# Patient Record
Sex: FEMALE | Race: WHITE | NOT HISPANIC OR LATINO | Employment: OTHER | URBAN - NONMETROPOLITAN AREA
[De-identification: names, ages, dates, MRNs, and addresses within clinical notes are randomized per-mention and may not be internally consistent; named-entity substitution may affect disease eponyms.]

---

## 2017-01-27 ENCOUNTER — PREP FOR SURGERY (OUTPATIENT)
Dept: SURGERY | Facility: HOSPITAL | Age: 67
End: 2017-01-27

## 2017-01-27 RX ORDER — PHENYLEPHRINE HYDROCHLORIDE 100 MG/ML
1 SOLUTION/ DROPS OPHTHALMIC
Status: CANCELLED | OUTPATIENT
Start: 2017-01-27 | End: 2017-01-27

## 2017-01-27 RX ORDER — CYCLOPENTOLATE HYDROCHLORIDE 10 MG/ML
1 SOLUTION/ DROPS OPHTHALMIC
Status: CANCELLED | OUTPATIENT
Start: 2017-01-27 | End: 2017-01-27

## 2017-02-01 RX ORDER — CHLORTHALIDONE 25 MG/1
25 TABLET ORAL DAILY
COMMUNITY
Start: 2012-11-08 | End: 2019-07-30

## 2017-02-01 RX ORDER — SERTRALINE HYDROCHLORIDE 100 MG/1
100 TABLET, FILM COATED ORAL DAILY
COMMUNITY
End: 2023-02-02 | Stop reason: SDUPTHER

## 2017-02-01 RX ORDER — LISINOPRIL 20 MG/1
20 TABLET ORAL DAILY
COMMUNITY
Start: 2012-12-18 | End: 2023-02-02 | Stop reason: SDUPTHER

## 2017-02-02 ENCOUNTER — ANESTHESIA EVENT (OUTPATIENT)
Dept: PERIOP | Facility: HOSPITAL | Age: 67
End: 2017-02-02

## 2017-02-02 ENCOUNTER — HOSPITAL ENCOUNTER (OUTPATIENT)
Facility: HOSPITAL | Age: 67
Setting detail: HOSPITAL OUTPATIENT SURGERY
Discharge: HOME | End: 2017-02-02
Attending: OPHTHALMOLOGY | Admitting: OPHTHALMOLOGY

## 2017-02-02 ENCOUNTER — ANESTHESIA (OUTPATIENT)
Dept: PERIOP | Facility: HOSPITAL | Age: 67
End: 2017-02-02

## 2017-02-02 VITALS
BODY MASS INDEX: 38.28 KG/M2 | DIASTOLIC BLOOD PRESSURE: 73 MMHG | OXYGEN SATURATION: 96 % | HEIGHT: 62 IN | WEIGHT: 208 LBS | SYSTOLIC BLOOD PRESSURE: 111 MMHG | RESPIRATION RATE: 18 BRPM | HEART RATE: 68 BPM | TEMPERATURE: 97.8 F

## 2017-02-02 PROCEDURE — C1780 LENS, INTRAOCULAR (NEW TECH): HCPCS | Performed by: OPHTHALMOLOGY

## 2017-02-02 PROCEDURE — 25010000002 EPINEPHRINE 1 MG/ML SOLUTION: Performed by: OPHTHALMOLOGY

## 2017-02-02 PROCEDURE — 25010000002 EPINEPHRINE PER 0.1 MG: Performed by: OPHTHALMOLOGY

## 2017-02-02 PROCEDURE — 25010000002 VANCOMYCIN PER 500 MG: Performed by: OPHTHALMOLOGY

## 2017-02-02 PROCEDURE — 25010000002 MIDAZOLAM PER 1 MG: Performed by: NURSE ANESTHETIST, CERTIFIED REGISTERED

## 2017-02-02 DEVICE — LENS ACRYSOF IQ 6X13MM SN60WF 22.0: Type: IMPLANTABLE DEVICE | Site: POSTERIOR CHAMBER | Status: FUNCTIONAL

## 2017-02-02 RX ORDER — VANCOMYCIN HYDROCHLORIDE 500 MG/10ML
INJECTION, POWDER, LYOPHILIZED, FOR SOLUTION INTRAVENOUS AS NEEDED
Status: DISCONTINUED | OUTPATIENT
Start: 2017-02-02 | End: 2017-02-02 | Stop reason: HOSPADM

## 2017-02-02 RX ORDER — MIDAZOLAM HYDROCHLORIDE 1 MG/ML
INJECTION INTRAMUSCULAR; INTRAVENOUS AS NEEDED
Status: DISCONTINUED | OUTPATIENT
Start: 2017-02-02 | End: 2017-02-02 | Stop reason: SURG

## 2017-02-02 RX ORDER — TETRACAINE HYDROCHLORIDE 5 MG/ML
SOLUTION OPHTHALMIC AS NEEDED
Status: DISCONTINUED | OUTPATIENT
Start: 2017-02-02 | End: 2017-02-02 | Stop reason: HOSPADM

## 2017-02-02 RX ORDER — PHENYLEPHRINE HYDROCHLORIDE 100 MG/ML
SOLUTION/ DROPS OPHTHALMIC
Status: COMPLETED
Start: 2017-02-02 | End: 2017-02-02

## 2017-02-02 RX ORDER — PREDNISOLONE ACETATE 10 MG/ML
SUSPENSION/ DROPS OPHTHALMIC
Status: DISCONTINUED
Start: 2017-02-02 | End: 2017-02-02 | Stop reason: HOSPADM

## 2017-02-02 RX ORDER — PREDNISOLONE ACETATE 10 MG/ML
1 SUSPENSION/ DROPS OPHTHALMIC 4 TIMES DAILY
Status: DISCONTINUED | OUTPATIENT
Start: 2017-02-02 | End: 2017-02-02 | Stop reason: HOSPADM

## 2017-02-02 RX ORDER — DEXTROSE AND SODIUM CHLORIDE 5; .2 G/100ML; G/100ML
70 INJECTION, SOLUTION INTRAVENOUS CONTINUOUS
Status: DISCONTINUED | OUTPATIENT
Start: 2017-02-02 | End: 2017-02-02 | Stop reason: HOSPADM

## 2017-02-02 RX ORDER — PHENYLEPHRINE HYDROCHLORIDE 100 MG/ML
1 SOLUTION/ DROPS OPHTHALMIC
Status: COMPLETED | OUTPATIENT
Start: 2017-02-02 | End: 2017-02-02

## 2017-02-02 RX ORDER — LIDOCAINE HYDROCHLORIDE 40 MG/ML
SOLUTION TOPICAL AS NEEDED
Status: DISCONTINUED | OUTPATIENT
Start: 2017-02-02 | End: 2017-02-02 | Stop reason: HOSPADM

## 2017-02-02 RX ORDER — EPINEPHRINE 1 MG/ML
INJECTION INTRAMUSCULAR; INTRAVENOUS; SUBCUTANEOUS AS NEEDED
Status: DISCONTINUED | OUTPATIENT
Start: 2017-02-02 | End: 2017-02-02 | Stop reason: HOSPADM

## 2017-02-02 RX ORDER — CYCLOPENTOLATE HYDROCHLORIDE 10 MG/ML
1 SOLUTION/ DROPS OPHTHALMIC
Status: COMPLETED | OUTPATIENT
Start: 2017-02-02 | End: 2017-02-02

## 2017-02-02 RX ORDER — CYCLOPENTOLATE HYDROCHLORIDE 10 MG/ML
SOLUTION/ DROPS OPHTHALMIC
Status: COMPLETED
Start: 2017-02-02 | End: 2017-02-02

## 2017-02-02 RX ADMIN — CYCLOPENTOLATE HYDROCHLORIDE 1 DROP: 10 SOLUTION/ DROPS OPHTHALMIC at 07:14

## 2017-02-02 RX ADMIN — MIDAZOLAM HYDROCHLORIDE 0.5 MG: 1 INJECTION, SOLUTION INTRAMUSCULAR; INTRAVENOUS at 08:50

## 2017-02-02 RX ADMIN — PHENYLEPHRINE HYDROCHLORIDE 1 DROP: 100 SOLUTION/ DROPS OPHTHALMIC at 07:14

## 2017-02-02 RX ADMIN — PHENYLEPHRINE HYDROCHLORIDE 1 DROP: 100 SOLUTION/ DROPS OPHTHALMIC at 07:19

## 2017-02-02 RX ADMIN — CYCLOPENTOLATE HYDROCHLORIDE 1 DROP: 10 SOLUTION/ DROPS OPHTHALMIC at 07:25

## 2017-02-02 RX ADMIN — CYCLOPENTOLATE HYDROCHLORIDE 1 DROP: 10 SOLUTION/ DROPS OPHTHALMIC at 07:19

## 2017-02-02 RX ADMIN — DEXTROSE AND SODIUM CHLORIDE 70 ML/HR: 5; 200 INJECTION, SOLUTION INTRAVENOUS at 07:23

## 2017-02-02 RX ADMIN — PHENYLEPHRINE HYDROCHLORIDE 1 DROP: 100 SOLUTION/ DROPS OPHTHALMIC at 07:25

## 2017-02-02 NOTE — PLAN OF CARE
Problem: Perioperative Period (Adult)  Goal: Signs and Symptoms of Listed Potential Problems Will be Absent or Manageable (Perioperative Period)  Outcome: Ongoing (interventions implemented as appropriate)    02/02/17 0718   Perioperative Period   Problems Assessed (Perioperative Period) pain;infection   Problems Present (Perioperative Period) none

## 2017-02-02 NOTE — ANESTHESIA POSTPROCEDURE EVALUATION
Patient: Elli Edwards    Procedure Summary     Date Anesthesia Start Anesthesia Stop Room / Location    02/02/17 0849 0915 [facility] OR 1 /  [facility]       Procedure Diagnosis Surgeon Provider    RIGHT CATARACT PHACO EXTRACTION WITH INTRAOCULAR LENS IMPLANT (Right Eye) No diagnosis on file. MD Rj Katz CRNA          Anesthesia Type: No value filed.  Last vitals  BP      Temp      Pulse     Resp      SpO2        Post Anesthesia Care and Evaluation    Patient location during evaluation: bedside  Patient participation: complete - patient participated  Level of consciousness: awake and alert  Pain score: 0  Pain management: satisfactory to patient  Airway patency: patent  Anesthetic complications: No anesthetic complications  PONV Status: none  Cardiovascular status: acceptable and stable  Respiratory status: acceptable  Hydration status: acceptable

## 2017-02-02 NOTE — H&P
[id number]  Elli Edwards  female  1950  Miky Garza MD  2/2/2017  PATIENT NAME: Elli Edwards    [facility]  PREOPERATIVE PHYSICAL EXAMINATION    Temp:  [97.8 °F (36.6 °C)] 97.8 °F (36.6 °C)  Heart Rate:  [77] 77  Resp:  [16] 16  BP: (121)/(71) 121/71    Past Medical History   Diagnosis Date   • Cataract      PT REPORTS BILATERAL EYES   • Hypertension    • Seasonal allergies    • Wears glasses        Past Surgical History   Procedure Laterality Date   • Laser ablation     • Tonsillectomy       AT AGE 5   • Colonoscopy     • Endoscopy     • Adenoidectomy     • Mouth surgery       SEVERAL WISDOM TEETH EXTRACTED   • Mouth surgery       ORAL IMPLANT       Social History     Social History   • Marital status:      Spouse name: N/A   • Number of children: N/A   • Years of education: N/A     Occupational History   • Not on file.     Social History Main Topics   • Smoking status: Never Smoker   • Smokeless tobacco: Never Used   • Alcohol use No      Comment: RARELY DRINKS, 2-3 X MONTHLY   • Drug use: No   • Sexual activity: Not on file     Other Topics Concern   • Not on file     Social History Narrative       Family History   Problem Relation Age of Onset   • Breast cancer Neg Hx    • Ovarian cancer Neg Hx        Prescriptions Prior to Admission   Medication Sig Dispense Refill Last Dose   • chlorthalidone (HYGROTON) 25 MG tablet Take 25 mg by mouth Daily.   2/2/2017 at 0600   • lisinopril (PRINIVIL,ZESTRIL) 20 MG tablet Take 20 mg by mouth Daily.   2/2/2017 at 0600   • sertraline (ZOLOFT) 100 MG tablet Take 150 mg by mouth Daily.   2/2/2017 at 0600        Within Normal Limits Abnormal   Mental Status [x]    []     Head, Neck, and Throat [x]   []     Chest/Lungs [x]   []     Cardiovascular [x]   []     Abdomen [x]   []     Extremities [x]   []     Neurologic [x]   []     Other       Diagnosis: Cataract      STATEMENT AS TO REASON OF PLANNED OPERATION:  [x]   H&P revealed no contraindication to planned  surgery. (Check if correct).    [x]   Labs (if ordered) have been reviewed and revealed no contraindications to planned surgery. (Check if correct).    [x]   Patient has been advised to see her local medical doctor/family doctor for follow-up regarding systemic care and/or abnormal labs.  (Check if correct).    Miky Garza MD  2/2/2017

## 2017-02-02 NOTE — ANESTHESIA PREPROCEDURE EVALUATION
Anesthesia Evaluation      Airway   Mallampati: II  TM distance: >3 FB  Neck ROM: full  no difficulty expected  Dental - normal exam     Pulmonary    Cardiovascular   (+) hypertension,     Rhythm: regular  Rate: normal    Neuro/Psych  GI/Hepatic/Renal/Endo    (+) obesity,      Musculoskeletal     Abdominal    Substance History      OB/GYN          Other   (+) arthritis                          Anesthesia Plan    ASA 2     intravenous induction   Anesthetic plan and risks discussed with patient.

## 2017-02-02 NOTE — OP NOTE
"Patient Name: Elli Edwards  Patient Number: [id number]    PRE-OPERATIVE DIAGNOSIS:  Cataract [x]  OD, []  OS  H25.1()    POST-OPERATIVE DIAGNOSIS:  Same    PROCEDURE:     RIGHT CATARACT PHACO EXTRACTION WITH INTRAOCULAR LENS IMPLANT (Right)    Surgeon:      Miky Garza MD    Date of Operation:    2/2/2017    ANESTHESIA:   MAC  COMPLICATIONS:    None  SPECIMEN REMOVED:  Cataract  ESTIMATED BLOOD LOSS:  None    After identifying the patient and obtained fully-informed consent, preoperative drops placed as ordered.  Pre-operative \"time out\" performed.  Patient brought into the operating room and positioned.  Cardiac monitoring was instituted.  Anesthetic gtt to operative eye.      After a surgical scrub, the patient was prepped and draped in the standard ophthalmic fashion.  Lid speculum. Paracentesis. Viscoelastic. Keratome tunneled into anterior chamber.  Capsulorrhexis. Hydrodissection.    Phaco machine tested and found to be in good working order.  Two-handed phacoemulsification performed.     I/A to remove residual cortex.  Viscoelastic in capsular bag.  Intraocular lens placed in standard fashion and centered.  I/A to remove viscoelastic.  Wound hydrated, tested, and found to be watertight.      Lid speculum and drapes removed.  Antibiotic gtt. Eye shield.  Patient to recovery area.  Verbal and written discharge instructions given.  Follow-up appointment given.    Miky Garza MD      Immediate Post-Op Note  Date: 2/2/2017 9:16 AM      "

## 2017-02-14 ENCOUNTER — PREP FOR SURGERY (OUTPATIENT)
Dept: SURGERY | Facility: HOSPITAL | Age: 67
End: 2017-02-14

## 2017-02-14 RX ORDER — CYCLOPENTOLATE HYDROCHLORIDE 20 MG/ML
1 SOLUTION/ DROPS OPHTHALMIC
Status: CANCELLED | OUTPATIENT
Start: 2017-02-14 | End: 2017-02-14

## 2017-02-14 RX ORDER — PHENYLEPHRINE HYDROCHLORIDE 100 MG/ML
1 SOLUTION/ DROPS OPHTHALMIC
Status: CANCELLED | OUTPATIENT
Start: 2017-02-14 | End: 2017-02-14

## 2017-02-15 NOTE — DISCHARGE INSTRUCTIONS
Patient instructed on PAT PASS information.  Patient/family member verbalized understanding of instruction/education.

## 2017-02-16 ENCOUNTER — HOSPITAL ENCOUNTER (OUTPATIENT)
Facility: HOSPITAL | Age: 67
Setting detail: HOSPITAL OUTPATIENT SURGERY
Discharge: HOME | End: 2017-02-16
Attending: OPHTHALMOLOGY | Admitting: OPHTHALMOLOGY

## 2017-02-16 ENCOUNTER — ANESTHESIA EVENT (OUTPATIENT)
Dept: PERIOP | Facility: HOSPITAL | Age: 67
End: 2017-02-16

## 2017-02-16 ENCOUNTER — ANESTHESIA (OUTPATIENT)
Dept: PERIOP | Facility: HOSPITAL | Age: 67
End: 2017-02-16

## 2017-02-16 VITALS
RESPIRATION RATE: 18 BRPM | DIASTOLIC BLOOD PRESSURE: 69 MMHG | HEIGHT: 62 IN | SYSTOLIC BLOOD PRESSURE: 106 MMHG | OXYGEN SATURATION: 96 % | WEIGHT: 208 LBS | BODY MASS INDEX: 38.28 KG/M2 | TEMPERATURE: 98.5 F | HEART RATE: 70 BPM

## 2017-02-16 PROCEDURE — 25010000002 MIDAZOLAM PER 1 MG: Performed by: NURSE ANESTHETIST, CERTIFIED REGISTERED

## 2017-02-16 PROCEDURE — C1780 LENS, INTRAOCULAR (NEW TECH): HCPCS | Performed by: OPHTHALMOLOGY

## 2017-02-16 PROCEDURE — 25010000002 EPINEPHRINE PER 0.1 MG: Performed by: OPHTHALMOLOGY

## 2017-02-16 PROCEDURE — 25010000002 VANCOMYCIN PER 500 MG: Performed by: OPHTHALMOLOGY

## 2017-02-16 DEVICE — LENS ACRYSOF IQ 6X13MM SN60WF 21.5: Type: IMPLANTABLE DEVICE | Site: POSTERIOR CHAMBER | Status: FUNCTIONAL

## 2017-02-16 RX ORDER — PHENYLEPHRINE HYDROCHLORIDE 100 MG/ML
SOLUTION/ DROPS OPHTHALMIC
Status: COMPLETED
Start: 2017-02-16 | End: 2017-02-16

## 2017-02-16 RX ORDER — PREDNISOLONE ACETATE 10 MG/ML
SUSPENSION/ DROPS OPHTHALMIC
Status: DISCONTINUED
Start: 2017-02-16 | End: 2017-02-16 | Stop reason: HOSPADM

## 2017-02-16 RX ORDER — SODIUM CHLORIDE, SODIUM LACTATE, POTASSIUM CHLORIDE, CALCIUM CHLORIDE 600; 310; 30; 20 MG/100ML; MG/100ML; MG/100ML; MG/100ML
1000 INJECTION, SOLUTION INTRAVENOUS CONTINUOUS PRN
Status: DISCONTINUED | OUTPATIENT
Start: 2017-02-16 | End: 2017-02-16 | Stop reason: HOSPADM

## 2017-02-16 RX ORDER — BALANCED SALT SOLUTION 6.4; .75; .48; .3; 3.9; 1.7 MG/ML; MG/ML; MG/ML; MG/ML; MG/ML; MG/ML
SOLUTION OPHTHALMIC AS NEEDED
Status: DISCONTINUED | OUTPATIENT
Start: 2017-02-16 | End: 2017-02-16 | Stop reason: HOSPADM

## 2017-02-16 RX ORDER — TETRACAINE HYDROCHLORIDE 5 MG/ML
SOLUTION OPHTHALMIC AS NEEDED
Status: DISCONTINUED | OUTPATIENT
Start: 2017-02-16 | End: 2017-02-16 | Stop reason: HOSPADM

## 2017-02-16 RX ORDER — SODIUM CHLORIDE 0.9 % (FLUSH) 0.9 %
3 SYRINGE (ML) INJECTION AS NEEDED
Status: DISCONTINUED | OUTPATIENT
Start: 2017-02-16 | End: 2017-02-16 | Stop reason: HOSPADM

## 2017-02-16 RX ORDER — EZETIMIBE 10 MG/1
10 TABLET ORAL DAILY
COMMUNITY
End: 2019-07-30

## 2017-02-16 RX ORDER — CYCLOPENTOLATE HYDROCHLORIDE 20 MG/ML
1 SOLUTION/ DROPS OPHTHALMIC
Status: COMPLETED | OUTPATIENT
Start: 2017-02-16 | End: 2017-02-16

## 2017-02-16 RX ORDER — CYCLOPENTOLATE HYDROCHLORIDE 20 MG/ML
SOLUTION/ DROPS OPHTHALMIC
Status: COMPLETED
Start: 2017-02-16 | End: 2017-02-16

## 2017-02-16 RX ORDER — MIDAZOLAM HYDROCHLORIDE 1 MG/ML
INJECTION INTRAMUSCULAR; INTRAVENOUS AS NEEDED
Status: DISCONTINUED | OUTPATIENT
Start: 2017-02-16 | End: 2017-02-16 | Stop reason: SURG

## 2017-02-16 RX ORDER — LIDOCAINE HYDROCHLORIDE 40 MG/ML
SOLUTION TOPICAL AS NEEDED
Status: DISCONTINUED | OUTPATIENT
Start: 2017-02-16 | End: 2017-02-16 | Stop reason: HOSPADM

## 2017-02-16 RX ORDER — PHENYLEPHRINE HYDROCHLORIDE 100 MG/ML
1 SOLUTION/ DROPS OPHTHALMIC
Status: COMPLETED | OUTPATIENT
Start: 2017-02-16 | End: 2017-02-16

## 2017-02-16 RX ORDER — PREDNISOLONE ACETATE 10 MG/ML
1 SUSPENSION/ DROPS OPHTHALMIC 4 TIMES DAILY
Status: DISCONTINUED | OUTPATIENT
Start: 2017-02-16 | End: 2017-02-16 | Stop reason: HOSPADM

## 2017-02-16 RX ORDER — VANCOMYCIN HYDROCHLORIDE 500 MG/10ML
INJECTION, POWDER, LYOPHILIZED, FOR SOLUTION INTRAVENOUS AS NEEDED
Status: DISCONTINUED | OUTPATIENT
Start: 2017-02-16 | End: 2017-02-16 | Stop reason: HOSPADM

## 2017-02-16 RX ADMIN — CYCLOPENTOLATE HYDROCHLORIDE 1 DROP: 20 SOLUTION/ DROPS OPHTHALMIC at 06:51

## 2017-02-16 RX ADMIN — CYCLOPENTOLATE HYDROCHLORIDE 1 DROP: 20 SOLUTION/ DROPS OPHTHALMIC at 06:41

## 2017-02-16 RX ADMIN — PHENYLEPHRINE HYDROCHLORIDE 1 DROP: 100 SOLUTION/ DROPS OPHTHALMIC at 06:46

## 2017-02-16 RX ADMIN — SODIUM CHLORIDE, POTASSIUM CHLORIDE, SODIUM LACTATE AND CALCIUM CHLORIDE 1000 ML: 600; 310; 30; 20 INJECTION, SOLUTION INTRAVENOUS at 06:49

## 2017-02-16 RX ADMIN — CYCLOPENTOLATE HYDROCHLORIDE 1 DROP: 20 SOLUTION/ DROPS OPHTHALMIC at 06:46

## 2017-02-16 RX ADMIN — PHENYLEPHRINE HYDROCHLORIDE 1 DROP: 100 SOLUTION/ DROPS OPHTHALMIC at 06:41

## 2017-02-16 RX ADMIN — MIDAZOLAM HYDROCHLORIDE 2 MG: 1 INJECTION, SOLUTION INTRAMUSCULAR; INTRAVENOUS at 08:12

## 2017-02-16 RX ADMIN — PHENYLEPHRINE HYDROCHLORIDE 1 DROP: 100 SOLUTION/ DROPS OPHTHALMIC at 06:51

## 2017-02-16 NOTE — H&P
[id number]  Elli Edwards  female  1950  Miky Garza MD  2/16/2017  PATIENT NAME: Elli Edwards    [facility]  PREOPERATIVE PHYSICAL EXAMINATION    Temp:  [98 °F (36.7 °C)] 98 °F (36.7 °C)  Heart Rate:  [76] 76  Resp:  [16] 16  BP: (117)/(79) 117/79    Past Medical History   Diagnosis Date   • Cataract      PT REPORTS BILATERAL EYES   • Hypertension    • Seasonal allergies    • Wears glasses        Past Surgical History   Procedure Laterality Date   • Laser ablation     • Tonsillectomy       AT AGE 5   • Colonoscopy     • Endoscopy     • Adenoidectomy     • Mouth surgery       SEVERAL WISDOM TEETH EXTRACTED   • Mouth surgery       ORAL IMPLANT   • Cataract extraction w/ intraocular lens implant Right 2/2/2017     Procedure: RIGHT CATARACT PHACO EXTRACTION WITH INTRAOCULAR LENS IMPLANT;  Surgeon: Miky Garza MD;  Location: [facility];  Service:    • Cataract extraction Right        Social History     Social History   • Marital status:      Spouse name: N/A   • Number of children: N/A   • Years of education: N/A     Occupational History   • Not on file.     Social History Main Topics   • Smoking status: Never Smoker   • Smokeless tobacco: Never Used   • Alcohol use No      Comment: RARELY DRINKS, 2-3 X MONTHLY   • Drug use: No   • Sexual activity: Not on file     Other Topics Concern   • Not on file     Social History Narrative       Family History   Problem Relation Age of Onset   • Breast cancer Neg Hx    • Ovarian cancer Neg Hx        Prescriptions Prior to Admission   Medication Sig Dispense Refill Last Dose   • chlorthalidone (HYGROTON) 25 MG tablet Take 25 mg by mouth Daily.   2/16/2017 at 0600   • ezetimibe (ZETIA) 10 MG tablet Take 10 mg by mouth Daily.   2/16/2017 at 0600   • lisinopril (PRINIVIL,ZESTRIL) 20 MG tablet Take 20 mg by mouth Daily.   2/16/2017 at 0600   • sertraline (ZOLOFT) 100 MG tablet Take 150 mg by mouth Daily.   2/16/2017 at 0600        Within Normal Limits Abnormal   Mental  Status [x]    []     Head, Neck, and Throat [x]   []     Chest/Lungs [x]   []     Cardiovascular [x]   []     Abdomen [x]   []     Extremities [x]   []     Neurologic [x]   []     Other       Diagnosis: Cataract      STATEMENT AS TO REASON OF PLANNED OPERATION:  [x]   H&P revealed no contraindication to planned surgery. (Check if correct).    [x]   Labs (if ordered) have been reviewed and revealed no contraindications to planned surgery. (Check if correct).    [x]   Patient has been advised to see her local medical doctor/family doctor for follow-up regarding systemic care and/or abnormal labs.  (Check if correct).    Miky Garza MD  2/16/2017

## 2017-02-16 NOTE — PLAN OF CARE
Problem: Perioperative Period (Adult)  Goal: Signs and Symptoms of Listed Potential Problems Will be Absent or Manageable (Perioperative Period)  Outcome: Ongoing (interventions implemented as appropriate)    02/16/17 0632   Perioperative Period   Problems Assessed (Perioperative Period) all   Problems Present (Perioperative Period) none

## 2017-02-16 NOTE — ANESTHESIA PREPROCEDURE EVALUATION
Anesthesia Evaluation     Patient summary reviewed and Nursing notes reviewed   no history of anesthetic complications:  NPO Status: > 8 hours   Airway   Mallampati: I  TM distance: >3 FB  Neck ROM: full  no difficulty expected  Dental - normal exam     Pulmonary - negative pulmonary ROS and normal exam   Cardiovascular - normal exam    (+) hypertension,       Neuro/Psych- negative ROS  GI/Hepatic/Renal/Endo - negative ROS     Musculoskeletal (-) negative ROS    Abdominal    Substance History - negative use     OB/GYN negative ob/gyn ROS         Other - negative ROS                                   Anesthesia Plan    ASA 2     MAC     intravenous induction   Anesthetic plan and risks discussed with patient.

## 2017-02-16 NOTE — OP NOTE
"Patient Name: Elli Edwards  Patient Number: [id number]    PRE-OPERATIVE DIAGNOSIS:  Cataract []  OD, [x]  OS  H25.1()    POST-OPERATIVE DIAGNOSIS:  Same    PROCEDURE:     LEFT CATARACT PHACO EXTRACTION WITH INTRAOCULAR LENS IMPLANT (Left)    Surgeon:      Miky Garza MD    Date of Operation:    2/16/2017    ANESTHESIA:   MAC  COMPLICATIONS:    None  SPECIMEN REMOVED:  Cataract  ESTIMATED BLOOD LOSS:  None    After identifying the patient and obtained fully-informed consent, preoperative drops placed as ordered.  Pre-operative \"time out\" performed.  Patient brought into the operating room and positioned.  Cardiac monitoring was instituted.  Anesthetic gtt to operative eye.      After a surgical scrub, the patient was prepped and draped in the standard ophthalmic fashion.  Lid speculum. Paracentesis. Viscoelastic. Keratome tunneled into anterior chamber.  Capsulorrhexis. Hydrodissection.    Phaco machine tested and found to be in good working order.  Two-handed phacoemulsification performed.     I/A to remove residual cortex.  Viscoelastic in capsular bag.  Intraocular lens placed in standard fashion and centered.  I/A to remove viscoelastic.  Wound hydrated, tested, and found to be watertight.      Lid speculum and drapes removed.  Antibiotic gtt. Eye shield.  Patient to recovery area.  Verbal and written discharge instructions given.  Follow-up appointment given.    Miky Garza MD      Immediate Post-Op Note  Date: 2/16/2017 8:34 AM      "

## 2017-06-26 ENCOUNTER — TRANSCRIBE ORDERS (OUTPATIENT)
Dept: ADMINISTRATIVE | Facility: HOSPITAL | Age: 67
End: 2017-06-26

## 2017-06-26 DIAGNOSIS — Z12.31 VISIT FOR SCREENING MAMMOGRAM: Primary | ICD-10-CM

## 2017-07-07 ENCOUNTER — APPOINTMENT (OUTPATIENT)
Dept: MAMMOGRAPHY | Facility: HOSPITAL | Age: 67
End: 2017-07-07

## 2017-07-19 ENCOUNTER — HOSPITAL ENCOUNTER (OUTPATIENT)
Dept: MAMMOGRAPHY | Facility: HOSPITAL | Age: 67
Discharge: HOME | End: 2017-07-19
Admitting: INTERNAL MEDICINE

## 2017-07-19 DIAGNOSIS — Z12.31 VISIT FOR SCREENING MAMMOGRAM: ICD-10-CM

## 2017-07-19 PROCEDURE — G0202 SCR MAMMO BI INCL CAD: HCPCS

## 2017-07-19 PROCEDURE — 77063 BREAST TOMOSYNTHESIS BI: CPT | Performed by: RADIOLOGY

## 2017-07-19 PROCEDURE — 77063 BREAST TOMOSYNTHESIS BI: CPT

## 2017-07-19 PROCEDURE — G0202 SCR MAMMO BI INCL CAD: HCPCS | Performed by: RADIOLOGY

## 2018-06-13 ENCOUNTER — TRANSCRIBE ORDERS (OUTPATIENT)
Dept: ADMINISTRATIVE | Facility: HOSPITAL | Age: 68
End: 2018-06-13

## 2018-06-13 DIAGNOSIS — Z12.31 VISIT FOR SCREENING MAMMOGRAM: Primary | ICD-10-CM

## 2018-07-23 ENCOUNTER — HOSPITAL ENCOUNTER (OUTPATIENT)
Dept: MAMMOGRAPHY | Facility: HOSPITAL | Age: 68
Discharge: HOME | End: 2018-07-23
Admitting: INTERNAL MEDICINE

## 2018-07-23 DIAGNOSIS — Z12.31 VISIT FOR SCREENING MAMMOGRAM: ICD-10-CM

## 2018-07-23 PROCEDURE — 77067 SCR MAMMO BI INCL CAD: CPT | Performed by: RADIOLOGY

## 2018-07-23 PROCEDURE — 77063 BREAST TOMOSYNTHESIS BI: CPT

## 2018-07-23 PROCEDURE — 77067 SCR MAMMO BI INCL CAD: CPT

## 2018-07-23 PROCEDURE — 77063 BREAST TOMOSYNTHESIS BI: CPT | Performed by: RADIOLOGY

## 2019-07-05 ENCOUNTER — TRANSCRIBE ORDERS (OUTPATIENT)
Dept: ADMINISTRATIVE | Facility: HOSPITAL | Age: 69
End: 2019-07-05

## 2019-07-05 DIAGNOSIS — R11.2 NAUSEA AND VOMITING, INTRACTABILITY OF VOMITING NOT SPECIFIED, UNSPECIFIED VOMITING TYPE: Primary | ICD-10-CM

## 2019-07-09 ENCOUNTER — HOSPITAL ENCOUNTER (OUTPATIENT)
Dept: ULTRASOUND IMAGING | Facility: HOSPITAL | Age: 69
Discharge: HOME | End: 2019-07-09
Admitting: INTERNAL MEDICINE

## 2019-07-09 DIAGNOSIS — R11.2 NAUSEA AND VOMITING, INTRACTABILITY OF VOMITING NOT SPECIFIED, UNSPECIFIED VOMITING TYPE: ICD-10-CM

## 2019-07-09 PROCEDURE — 76705 ECHO EXAM OF ABDOMEN: CPT

## 2019-07-30 ENCOUNTER — APPOINTMENT (OUTPATIENT)
Dept: PREADMISSION TESTING | Facility: HOSPITAL | Age: 69
End: 2019-07-30

## 2019-07-30 VITALS — BODY MASS INDEX: 37.53 KG/M2 | HEIGHT: 62 IN | WEIGHT: 203.93 LBS

## 2019-07-30 LAB
ALBUMIN SERPL-MCNC: 4.3 G/DL (ref 3.5–5.2)
ALBUMIN/GLOB SERPL: 1.7 G/DL
ALP SERPL-CCNC: 67 U/L (ref 39–117)
ALT SERPL W P-5'-P-CCNC: 12 U/L (ref 1–33)
ANION GAP SERPL CALCULATED.3IONS-SCNC: 9 MMOL/L (ref 5–15)
AST SERPL-CCNC: 14 U/L (ref 1–32)
BILIRUB SERPL-MCNC: 0.5 MG/DL (ref 0.2–1.2)
BUN BLD-MCNC: 12 MG/DL (ref 8–23)
BUN/CREAT SERPL: 20 (ref 7–25)
CALCIUM SPEC-SCNC: 9.5 MG/DL (ref 8.6–10.5)
CHLORIDE SERPL-SCNC: 104 MMOL/L (ref 98–107)
CO2 SERPL-SCNC: 28 MMOL/L (ref 22–29)
CREAT BLD-MCNC: 0.6 MG/DL (ref 0.57–1)
DEPRECATED RDW RBC AUTO: 43.7 FL (ref 37–54)
ERYTHROCYTE [DISTWIDTH] IN BLOOD BY AUTOMATED COUNT: 13 % (ref 12.3–15.4)
GFR SERPL CREATININE-BSD FRML MDRD: 99 ML/MIN/1.73
GLOBULIN UR ELPH-MCNC: 2.5 GM/DL
GLUCOSE BLD-MCNC: 107 MG/DL (ref 65–99)
HCT VFR BLD AUTO: 47.2 % (ref 34–46.6)
HGB BLD-MCNC: 15.2 G/DL (ref 12–15.9)
MCH RBC QN AUTO: 29.5 PG (ref 26.6–33)
MCHC RBC AUTO-ENTMCNC: 32.2 G/DL (ref 31.5–35.7)
MCV RBC AUTO: 91.7 FL (ref 79–97)
PLATELET # BLD AUTO: 300 10*3/MM3 (ref 140–450)
PMV BLD AUTO: 11 FL (ref 6–12)
POTASSIUM BLD-SCNC: 4.2 MMOL/L (ref 3.5–5.2)
PROT SERPL-MCNC: 6.8 G/DL (ref 6–8.5)
RBC # BLD AUTO: 5.15 10*6/MM3 (ref 3.77–5.28)
SODIUM BLD-SCNC: 141 MMOL/L (ref 136–145)
WBC NRBC COR # BLD: 6.74 10*3/MM3 (ref 3.4–10.8)

## 2019-07-30 PROCEDURE — 36415 COLL VENOUS BLD VENIPUNCTURE: CPT

## 2019-07-30 PROCEDURE — 85027 COMPLETE CBC AUTOMATED: CPT | Performed by: SURGERY

## 2019-07-30 PROCEDURE — 80053 COMPREHEN METABOLIC PANEL: CPT | Performed by: SURGERY

## 2019-07-30 PROCEDURE — 93010 ELECTROCARDIOGRAM REPORT: CPT | Performed by: INTERNAL MEDICINE

## 2019-07-30 PROCEDURE — 93005 ELECTROCARDIOGRAM TRACING: CPT

## 2019-08-05 ENCOUNTER — ANESTHESIA EVENT (OUTPATIENT)
Dept: PERIOP | Facility: HOSPITAL | Age: 69
End: 2019-08-05

## 2019-08-05 RX ORDER — FAMOTIDINE 10 MG/ML
20 INJECTION, SOLUTION INTRAVENOUS ONCE
Status: CANCELLED | OUTPATIENT
Start: 2019-08-05 | End: 2019-08-05

## 2019-08-06 ENCOUNTER — HOSPITAL ENCOUNTER (OUTPATIENT)
Facility: HOSPITAL | Age: 69
Discharge: HOME | End: 2019-08-06
Attending: SURGERY | Admitting: SURGERY

## 2019-08-06 ENCOUNTER — ANESTHESIA (OUTPATIENT)
Dept: PERIOP | Facility: HOSPITAL | Age: 69
End: 2019-08-06

## 2019-08-06 VITALS
HEART RATE: 84 BPM | SYSTOLIC BLOOD PRESSURE: 157 MMHG | DIASTOLIC BLOOD PRESSURE: 84 MMHG | OXYGEN SATURATION: 90 % | TEMPERATURE: 97.4 F | RESPIRATION RATE: 16 BRPM

## 2019-08-06 DIAGNOSIS — K80.20 CHOLELITHIASIS: ICD-10-CM

## 2019-08-06 PROCEDURE — 25010000002 PHENYLEPHRINE PER 1 ML: Performed by: NURSE ANESTHETIST, CERTIFIED REGISTERED

## 2019-08-06 PROCEDURE — 25010000002 ONDANSETRON PER 1 MG: Performed by: NURSE ANESTHETIST, CERTIFIED REGISTERED

## 2019-08-06 PROCEDURE — 25010000002 DEXAMETHASONE PER 1 MG: Performed by: NURSE ANESTHETIST, CERTIFIED REGISTERED

## 2019-08-06 PROCEDURE — 88304 TISSUE EXAM BY PATHOLOGIST: CPT | Performed by: SURGERY

## 2019-08-06 PROCEDURE — 25010000002 FENTANYL CITRATE (PF) 100 MCG/2ML SOLUTION: Performed by: NURSE ANESTHETIST, CERTIFIED REGISTERED

## 2019-08-06 PROCEDURE — 25010000002 PROPOFOL 10 MG/ML EMULSION: Performed by: NURSE ANESTHETIST, CERTIFIED REGISTERED

## 2019-08-06 RX ORDER — LIDOCAINE HYDROCHLORIDE 10 MG/ML
0.5 INJECTION, SOLUTION EPIDURAL; INFILTRATION; INTRACAUDAL; PERINEURAL ONCE AS NEEDED
Status: COMPLETED | OUTPATIENT
Start: 2019-08-06 | End: 2019-08-06

## 2019-08-06 RX ORDER — FENTANYL CITRATE 50 UG/ML
INJECTION, SOLUTION INTRAMUSCULAR; INTRAVENOUS AS NEEDED
Status: DISCONTINUED | OUTPATIENT
Start: 2019-08-06 | End: 2019-08-06 | Stop reason: SURG

## 2019-08-06 RX ORDER — SODIUM CHLORIDE 0.9 % (FLUSH) 0.9 %
3 SYRINGE (ML) INJECTION EVERY 12 HOURS SCHEDULED
Status: DISCONTINUED | OUTPATIENT
Start: 2019-08-06 | End: 2019-08-06 | Stop reason: HOSPADM

## 2019-08-06 RX ORDER — PROPOFOL 10 MG/ML
VIAL (ML) INTRAVENOUS AS NEEDED
Status: DISCONTINUED | OUTPATIENT
Start: 2019-08-06 | End: 2019-08-06 | Stop reason: SURG

## 2019-08-06 RX ORDER — HYDROMORPHONE HYDROCHLORIDE 1 MG/ML
0.5 INJECTION, SOLUTION INTRAMUSCULAR; INTRAVENOUS; SUBCUTANEOUS
Status: DISCONTINUED | OUTPATIENT
Start: 2019-08-06 | End: 2019-08-06 | Stop reason: HOSPADM

## 2019-08-06 RX ORDER — SODIUM CHLORIDE 9 MG/ML
INJECTION, SOLUTION INTRAVENOUS AS NEEDED
Status: DISCONTINUED | OUTPATIENT
Start: 2019-08-06 | End: 2019-08-06 | Stop reason: HOSPADM

## 2019-08-06 RX ORDER — SODIUM CHLORIDE 0.9 % (FLUSH) 0.9 %
3-10 SYRINGE (ML) INJECTION AS NEEDED
Status: DISCONTINUED | OUTPATIENT
Start: 2019-08-06 | End: 2019-08-06 | Stop reason: HOSPADM

## 2019-08-06 RX ORDER — ATRACURIUM BESYLATE 10 MG/ML
INJECTION, SOLUTION INTRAVENOUS AS NEEDED
Status: DISCONTINUED | OUTPATIENT
Start: 2019-08-06 | End: 2019-08-06 | Stop reason: SURG

## 2019-08-06 RX ORDER — FENTANYL CITRATE 50 UG/ML
50 INJECTION, SOLUTION INTRAMUSCULAR; INTRAVENOUS
Status: DISCONTINUED | OUTPATIENT
Start: 2019-08-06 | End: 2019-08-06 | Stop reason: HOSPADM

## 2019-08-06 RX ORDER — MAGNESIUM HYDROXIDE 1200 MG/15ML
LIQUID ORAL AS NEEDED
Status: DISCONTINUED | OUTPATIENT
Start: 2019-08-06 | End: 2019-08-06 | Stop reason: HOSPADM

## 2019-08-06 RX ORDER — HYDROCODONE BITARTRATE AND ACETAMINOPHEN 5; 325 MG/1; MG/1
1 TABLET ORAL EVERY 6 HOURS PRN
Qty: 10 TABLET | Refills: 0 | Status: SHIPPED | OUTPATIENT
Start: 2019-08-06 | End: 2019-08-14

## 2019-08-06 RX ORDER — NEOSTIGMINE METHYLSULFATE 5 MG/5 ML
SYRINGE (ML) INTRAVENOUS AS NEEDED
Status: DISCONTINUED | OUTPATIENT
Start: 2019-08-06 | End: 2019-08-06 | Stop reason: SURG

## 2019-08-06 RX ORDER — SIMETHICONE 80 MG
80 TABLET,CHEWABLE ORAL 4 TIMES DAILY PRN
Status: DISCONTINUED | OUTPATIENT
Start: 2019-08-06 | End: 2019-08-06 | Stop reason: HOSPADM

## 2019-08-06 RX ORDER — BUPIVACAINE HYDROCHLORIDE 2.5 MG/ML
INJECTION, SOLUTION INFILTRATION; PERINEURAL AS NEEDED
Status: DISCONTINUED | OUTPATIENT
Start: 2019-08-06 | End: 2019-08-06 | Stop reason: HOSPADM

## 2019-08-06 RX ORDER — ONDANSETRON 2 MG/ML
INJECTION INTRAMUSCULAR; INTRAVENOUS AS NEEDED
Status: DISCONTINUED | OUTPATIENT
Start: 2019-08-06 | End: 2019-08-06 | Stop reason: SURG

## 2019-08-06 RX ORDER — FAMOTIDINE 20 MG/1
20 TABLET, FILM COATED ORAL ONCE
Status: COMPLETED | OUTPATIENT
Start: 2019-08-06 | End: 2019-08-06

## 2019-08-06 RX ORDER — ONDANSETRON 2 MG/ML
4 INJECTION INTRAMUSCULAR; INTRAVENOUS ONCE AS NEEDED
Status: DISCONTINUED | OUTPATIENT
Start: 2019-08-06 | End: 2019-08-06 | Stop reason: HOSPADM

## 2019-08-06 RX ORDER — GLYCOPYRROLATE 0.2 MG/ML
INJECTION INTRAMUSCULAR; INTRAVENOUS AS NEEDED
Status: DISCONTINUED | OUTPATIENT
Start: 2019-08-06 | End: 2019-08-06 | Stop reason: SURG

## 2019-08-06 RX ORDER — DEXAMETHASONE SODIUM PHOSPHATE 4 MG/ML
INJECTION, SOLUTION INTRA-ARTICULAR; INTRALESIONAL; INTRAMUSCULAR; INTRAVENOUS; SOFT TISSUE AS NEEDED
Status: DISCONTINUED | OUTPATIENT
Start: 2019-08-06 | End: 2019-08-06 | Stop reason: SURG

## 2019-08-06 RX ORDER — SODIUM CHLORIDE, SODIUM LACTATE, POTASSIUM CHLORIDE, CALCIUM CHLORIDE 600; 310; 30; 20 MG/100ML; MG/100ML; MG/100ML; MG/100ML
9 INJECTION, SOLUTION INTRAVENOUS CONTINUOUS
Status: DISCONTINUED | OUTPATIENT
Start: 2019-08-06 | End: 2019-08-06 | Stop reason: HOSPADM

## 2019-08-06 RX ADMIN — ATRACURIUM BESYLATE 30 MG: 10 INJECTION, SOLUTION INTRAVENOUS at 12:30

## 2019-08-06 RX ADMIN — EPHEDRINE SULFATE 10 MG: 50 INJECTION INTRAMUSCULAR; INTRAVENOUS; SUBCUTANEOUS at 12:51

## 2019-08-06 RX ADMIN — FENTANYL CITRATE 50 MCG: 50 INJECTION, SOLUTION INTRAMUSCULAR; INTRAVENOUS at 13:31

## 2019-08-06 RX ADMIN — DEXAMETHASONE SODIUM PHOSPHATE 8 MG: 4 INJECTION, SOLUTION INTRAMUSCULAR; INTRAVENOUS at 12:48

## 2019-08-06 RX ADMIN — GLYCOPYRROLATE 0.2 MG: 0.2 INJECTION, SOLUTION INTRAMUSCULAR; INTRAVENOUS at 13:22

## 2019-08-06 RX ADMIN — PROPOFOL 120 MG: 10 INJECTION, EMULSION INTRAVENOUS at 12:29

## 2019-08-06 RX ADMIN — SIMETHICONE CHEW TAB 80 MG 80 MG: 80 TABLET ORAL at 13:56

## 2019-08-06 RX ADMIN — GLYCOPYRROLATE 0.2 MG: 0.2 INJECTION, SOLUTION INTRAMUSCULAR; INTRAVENOUS at 12:45

## 2019-08-06 RX ADMIN — PROPOFOL 30 MG: 10 INJECTION, EMULSION INTRAVENOUS at 12:35

## 2019-08-06 RX ADMIN — ONDANSETRON 4 MG: 2 INJECTION INTRAMUSCULAR; INTRAVENOUS at 13:07

## 2019-08-06 RX ADMIN — LIDOCAINE HYDROCHLORIDE 0.2 ML: 10 INJECTION, SOLUTION EPIDURAL; INFILTRATION; INTRACAUDAL; PERINEURAL at 11:33

## 2019-08-06 RX ADMIN — SODIUM CHLORIDE, POTASSIUM CHLORIDE, SODIUM LACTATE AND CALCIUM CHLORIDE: 600; 310; 30; 20 INJECTION, SOLUTION INTRAVENOUS at 13:02

## 2019-08-06 RX ADMIN — FAMOTIDINE 20 MG: 20 TABLET ORAL at 11:44

## 2019-08-06 RX ADMIN — Medication 3 MG: at 13:16

## 2019-08-06 RX ADMIN — FENTANYL CITRATE 50 MCG: 50 INJECTION, SOLUTION INTRAMUSCULAR; INTRAVENOUS at 12:29

## 2019-08-06 RX ADMIN — Medication 2 MG: at 13:22

## 2019-08-06 RX ADMIN — PHENYLEPHRINE HYDROCHLORIDE 100 MCG: 10 INJECTION INTRAVENOUS at 12:53

## 2019-08-06 RX ADMIN — GLYCOPYRROLATE 0.4 MG: 0.2 INJECTION, SOLUTION INTRAMUSCULAR; INTRAVENOUS at 13:16

## 2019-08-06 RX ADMIN — PROPOFOL 50 MG: 10 INJECTION, EMULSION INTRAVENOUS at 12:31

## 2019-08-06 RX ADMIN — SODIUM CHLORIDE, POTASSIUM CHLORIDE, SODIUM LACTATE AND CALCIUM CHLORIDE 9 ML/HR: 600; 310; 30; 20 INJECTION, SOLUTION INTRAVENOUS at 11:33

## 2019-08-06 NOTE — BRIEF OP NOTE
CHOLECYSTECTOMY LAPAROSCOPIC  Progress Note    Elli Edwards  8/6/2019    Pre-op Diagnosis:   Cholelithiasis       Post-Op Diagnosis Codes:  Same plus cholecystitis    Procedure/CPT® Codes:      Procedure(s):  CHOLECYSTECTOMY LAPAROSCOPIC    Surgeon(s):  Oliver Aguilar MD    Anesthesia: General    Staff:   Circulator: Dinorah Marcano RN  Scrub Person: Sterling Johnson; Mare Rendon  Nursing Assistant: Ryan Ureña    Estimated Blood Loss: 5 mL    Specimens:                Specimens     ID Source Type Tests Collected By Collected At Frozen?      A Gallbladder Tissue · TISSUE PATHOLOGY EXAM   Oliver Aguilar MD 8/6/19 0749 No         Findings: omental adhesions, lucian ailyn gisele type adhesions from liver to abd wall and gallbladder to liver    Complications: none      Oliver Aguilar MD     Date: 8/6/2019  Time: 1:24 PM

## 2019-08-06 NOTE — ANESTHESIA PROCEDURE NOTES
Airway  Urgency: elective    Airway not difficult    General Information and Staff    Patient location during procedure: OR  CRNA: Tamar Geiger CRNA    Indications and Patient Condition  Indications for airway management: airway protection    Preoxygenated: yes  MILS not maintained throughout  Mask difficulty assessment: 1 - vent by mask    Final Airway Details  Final airway type: endotracheal airway      Successful airway: ETT  Cuffed: yes   Successful intubation technique: direct laryngoscopy  Facilitating devices/methods: intubating stylet  Endotracheal tube insertion site: oral  Blade: Pete  Blade size: 3  ETT size (mm): 7.5  Cormack-Lehane Classification: grade IIb - view of arytenoids or posterior of glottis only  Placement verified by: chest auscultation and capnometry   Measured from: lips  ETT to lips (cm): 20  Number of attempts at approach: 1    Additional Comments  Gradeview III with mac 3. Unable to pass eschman. Patient ventilated- VSS. glidescope used, gradeview IIb, and very tight. No dental or gum damage noted. Negative epigastric sounds, Breath sound equal bilaterally with symmetric chest rise and fall

## 2019-08-06 NOTE — ANESTHESIA PREPROCEDURE EVALUATION
Anesthesia Evaluation     Patient summary reviewed and Nursing notes reviewed   no history of anesthetic complications:  NPO Solid Status: > 8 hours  NPO Liquid Status: > 4 hours           Airway   Mallampati: III  TM distance: >3 FB  Neck ROM: full  No difficulty expected  Dental      Comment: Good dentition    Pulmonary - negative pulmonary ROS    breath sounds clear to auscultation  (-) COPD, asthma, shortness of breath, sleep apnea  Cardiovascular   Exercise tolerance: good (4-7 METS)    Rhythm: regular  Rate: normal    (+) hyperlipidemia,   (-) past MI, CAD, murmur, friction rub      Neuro/Psych  (-) seizures, CVA  GI/Hepatic/Renal/Endo    (+) obesity,       ROS Comment: Gallstones. Diarrhea with N/V. Not currently nauseous.     Musculoskeletal (-) negative ROS    Abdominal   (+) obese,    Substance History - negative use     OB/GYN negative ob/gyn ROS         Other - negative ROS                       Anesthesia Plan    ASA 2     general with block and general     Anesthetic plan, all risks, benefits, and alternatives have been provided, discussed and informed consent has been obtained with: patient.  Use of blood products discussed with patient .   Plan discussed with CRNA.

## 2019-08-06 NOTE — INTERVAL H&P NOTE
[facility] Pre-op    Full history and physical note from office is up to date.  See office note attached.    /81 (BP Location: Right arm, Patient Position: Lying)   Pulse 74   Temp 98.5 °F (36.9 °C) (Temporal)   Resp 18   SpO2 94%     LAB Results:  Lab Results   Component Value Date    WBC 6.74 07/30/2019    HGB 15.2 07/30/2019    HCT 47.2 (H) 07/30/2019    MCV 91.7 07/30/2019     07/30/2019    GLUCOSE 107 (H) 07/30/2019    BUN 12 07/30/2019    CREATININE 0.60 07/30/2019    EGFRIFNONA 99 07/30/2019     07/30/2019    K 4.2 07/30/2019     07/30/2019    CO2 28.0 07/30/2019    CALCIUM 9.5 07/30/2019    ALBUMIN 4.30 07/30/2019    AST 14 07/30/2019    ALT 12 07/30/2019    BILITOT 0.5 07/30/2019       Cancer Staging (if applicable)  Cancer Patient: __ yes _x_no __unknown__N/A; If yes, clinical stage T:__ N:__M:__, stage group or __N/A    Saida Murrell, APRN 8/6/2019 12:22 PM

## 2019-08-06 NOTE — OP NOTE
Operative Report    Patient Name:  Elli Edwards  YOB: 1950  [id number]  Date of service: 8/6/2019      PREOPERATIVE DIAGNOSIS: cholelithiasis      POSTOPERATIVE DIAGNOSIS: Same plus chronic cholecystitis       PROCEDURE PERFORMED: Laparoscopic cholecystectomy     SURGEON: Oliver Aguilar MD    SPECIMENS: Gallbladder and contents      ANESTHESIA: General.      FINDINGS: omental adhesions, lucian ailyn gisele type adhesions from liver to abd wall and gallbladder to liver     INDICATIONS:      The patient is a 68 y.o. female with a history of abdominal pain, concerning for biliary etiology. Pre-operative imaging confirmed the diagnosis. The risks and benefits of Laparoscopic cholecystectomy  were discussed with the patient and their family and they agree to proceed.      DESCRIPTION OF PROCEDURE:     After obtaining informed consent, the patient was taken to the operating room and placed in the supine position. After appropriate DVT prophylaxis, general anesthesia was induced. The abdomen was prepped and draped in standard sterile fashion, and after infiltrating the skin with local anesthetic, a transverse 12mm skin incision was made inferior to the umbilicus. Blunt dissection was carried down to the base of the umbilicus, which was grasped with a Kocher clamp and elevated anteriorly. A vertical midline incision was made in the fascia, and blunt dissection was carried down into the peritoneal cavity.Beba trocar was placed. The abdomen was insufflated with carbon dioxide gas to a pressure of 15 mmHg, and a laparoscope advanced through the trocar and the abdominal contents were inspected. There was no evidence of bowel, bladder, or visceral injury with entrance of the trocar. At this point, after infiltrating the skin with local anesthetic, a standard laparoscopic cholecystectomy trocar placement schema was followed.     The omental adhesions and liver adhesions were taken down. The gallbladder was  "grasped and elevated superiorly. Using meticulous blunt dissection, the cystic duct and cystic artery were bluntly dissected free of other structures and clearly identified using the \"Critical View\" technique. The cystic duct and artery were then clipped three times proximally and once distally, and divided between the clips.     The gallbladder was then dissected free of the gallbladder fossa using a combination of electrocautery and blunt dissection. The gallbladder was then placed in an Endocatch bag, and removed from the inferiormost trocar site.     The right upper quadrant was then inspected. The cystic duct and cystic artery stumps were intact without bleeding or biliary leak and liver bed was hemostatic. The right upper quadrant was irrigated with saline until clear. The abdomen was again irrigated with saline until clear, and all trocars removed under direct and laparoscopic visualization. The fascia at the unmbilical incision was closed using 0 Vicryl suture. The wounds were closed in each area using absorbable subcuticular suture. The incisions were dressed in standard sterile fashion and covered with dry dressings. The patient recovered from anesthesia, was extubated in the operating room, and transferred to the PACU in stable condition.  All sponge and needle counts were correct times two at the completion of the procedure. There were no immediate complications.    Oliver Aguilar MD  8/6/2019  1:26 PM      "

## 2019-08-06 NOTE — ANESTHESIA POSTPROCEDURE EVALUATION
Patient: Elli Edwards    Procedure Summary     Date:  08/06/19 Room / Location:   [facility] OR 11 /  [facility]    Anesthesia Start:  1224 Anesthesia Stop:      Procedure:  CHOLECYSTECTOMY LAPAROSCOPIC (N/A Abdomen) Diagnosis:      Surgeon:  Oliver Aguilar MD Provider:  Danilo Prado Jr., MD    Anesthesia Type:  general with block, general ASA Status:  2          Anesthesia Type: general with block, general  Last vitals  BP   139/81 (08/06/19 1137)   Temp   98.5 °F (36.9 °C) (08/06/19 1137)   Pulse   74 (08/06/19 1137)   Resp   18 (08/06/19 1137)     SpO2   94 % (08/06/19 1137)     Post Anesthesia Care and Evaluation    Patient location during evaluation: PACU  Patient participation: complete - patient participated  Level of consciousness: awake and alert  Pain score: 0  Pain management: adequate  Airway patency: patent  Anesthetic complications: No anesthetic complications  PONV Status: none  Cardiovascular status: hemodynamically stable and acceptable  Respiratory status: nonlabored ventilation, acceptable and nasal cannula  Hydration status: acceptable

## 2019-08-07 LAB
CYTO UR: NORMAL
LAB AP CASE REPORT: NORMAL
LAB AP CLINICAL INFORMATION: NORMAL
PATH REPORT.FINAL DX SPEC: NORMAL
PATH REPORT.GROSS SPEC: NORMAL

## 2019-08-12 RX ORDER — ROSUVASTATIN CALCIUM 20 MG/1
20 TABLET, COATED ORAL DAILY
COMMUNITY
End: 2019-08-14

## 2019-08-12 RX ORDER — CHLORTHALIDONE 25 MG/1
25 TABLET ORAL DAILY
COMMUNITY
End: 2019-08-14

## 2019-08-12 RX ORDER — EZETIMIBE 10 MG/1
10 TABLET ORAL DAILY
COMMUNITY
End: 2019-08-14

## 2019-08-12 RX ORDER — LAMOTRIGINE 25 MG/1
25 TABLET ORAL DAILY
COMMUNITY
End: 2019-08-14

## 2019-08-12 RX ORDER — MONTELUKAST SODIUM 4 MG/1
1 TABLET, CHEWABLE ORAL
COMMUNITY
End: 2019-08-14

## 2019-08-14 ENCOUNTER — OFFICE VISIT (OUTPATIENT)
Dept: GASTROENTEROLOGY | Facility: CLINIC | Age: 69
End: 2019-08-14

## 2019-08-14 VITALS
WEIGHT: 201 LBS | DIASTOLIC BLOOD PRESSURE: 78 MMHG | HEIGHT: 62 IN | RESPIRATION RATE: 16 BRPM | HEART RATE: 76 BPM | BODY MASS INDEX: 36.99 KG/M2 | SYSTOLIC BLOOD PRESSURE: 155 MMHG | TEMPERATURE: 97.5 F

## 2019-08-14 DIAGNOSIS — Z12.11 COLON CANCER SCREENING: Primary | ICD-10-CM

## 2019-08-14 PROBLEM — I80.9 PHLEBITIS: Status: ACTIVE | Noted: 2019-08-14

## 2019-08-14 PROBLEM — E78.5 HYPERLIPIDEMIA: Status: ACTIVE | Noted: 2019-08-14

## 2019-08-14 PROBLEM — R42 VERTIGO: Status: ACTIVE | Noted: 2019-08-14

## 2019-08-14 PROBLEM — I10 BENIGN ESSENTIAL HYPERTENSION: Status: ACTIVE | Noted: 2019-08-14

## 2019-08-14 PROBLEM — R12 HEARTBURN: Status: ACTIVE | Noted: 2019-08-14

## 2019-08-14 PROBLEM — K63.5 COLON POLYP: Status: ACTIVE | Noted: 2019-08-14

## 2019-08-14 PROBLEM — E55.9 VITAMIN D DEFICIENCY: Status: ACTIVE | Noted: 2019-08-14

## 2019-08-14 PROCEDURE — S0260 H&P FOR SURGERY: HCPCS | Performed by: NURSE PRACTITIONER

## 2019-08-14 RX ORDER — SODIUM CHLORIDE 9 MG/ML
70 INJECTION, SOLUTION INTRAVENOUS CONTINUOUS PRN
Status: CANCELLED | OUTPATIENT
Start: 2019-10-08

## 2019-08-14 NOTE — PROGRESS NOTES
Chief Complaint   Patient presents with   • Colon Cancer Screening     HPI     The patient denies recent change in bowel habits. There is no diarrhea or constipation. There is no history of abdominal pain. There is no history of overt GI bleed (hematemesis melena or hematochezia). The patient denies nausea or vomiting. There is no history of reflux. The patient denies dysphagia or odynophagia. There is no history of recent significant weight loss. There is no history of liver disease in the past. There is a family history of colon cancer in the patient's grandmother. The patient's last colonoscopy was in 2015.    Review of Systems   Constitutional: Negative for appetite change, chills, fatigue, fever and unexpected weight change.   HENT: Negative for mouth sores, nosebleeds and trouble swallowing.    Eyes: Negative for discharge and redness.   Respiratory: Negative for apnea, cough and shortness of breath.    Cardiovascular: Negative for chest pain, palpitations and leg swelling.   Gastrointestinal: Negative for abdominal distention, abdominal pain, anal bleeding, blood in stool, constipation, diarrhea, nausea and vomiting.   Endocrine: Negative for cold intolerance, heat intolerance and polydipsia.   Genitourinary: Negative for dysuria, hematuria and urgency.   Musculoskeletal: Negative for arthralgias, joint swelling and myalgias.   Skin: Negative for rash.   Allergic/Immunologic: Positive for environmental allergies and food allergies. Negative for immunocompromised state.   Neurological: Negative for dizziness, seizures, syncope and headaches.   Hematological: Negative for adenopathy. Does not bruise/bleed easily.   Psychiatric/Behavioral: Negative for dysphoric mood. The patient is nervous/anxious. The patient is not hyperactive.      Patient Active Problem List   Diagnosis   • Benign essential hypertension   • Colon polyp   • Heartburn   • Hyperlipidemia   • Phlebitis   • Vertigo   • Vitamin D deficiency  "    Past Medical History:   Diagnosis Date   • Arthritis    • Cataract     PT REPORTS BILATERAL EYES   • Colon polyp 12/03/2015   • Depression    • Diverticulosis    • Elevated cholesterol    • Hypertension    • Seasonal allergies    • Wears glasses      Past Surgical History:   Procedure Laterality Date   • ADENOIDECTOMY     • CATARACT EXTRACTION Bilateral    • CATARACT EXTRACTION W/ INTRAOCULAR LENS IMPLANT Right 2/2/2017    Procedure: RIGHT CATARACT PHACO EXTRACTION WITH INTRAOCULAR LENS IMPLANT;  Surgeon: Miky Garza MD;  Location:  [facility];  Service:    • CATARACT EXTRACTION W/ INTRAOCULAR LENS IMPLANT Left 2/16/2017    Procedure: LEFT CATARACT PHACO EXTRACTION WITH INTRAOCULAR LENS IMPLANT;  Surgeon: Miky Garza MD;  Location:  [facility];  Service:    • CHOLECYSTECTOMY N/A 8/6/2019    Procedure: CHOLECYSTECTOMY LAPAROSCOPIC;  Surgeon: Oliver Aguilar MD;  Location:  [facility];  Service: General   • COLONOSCOPY  2016   • ENDOSCOPY     • LASER ABLATION      cervical   • MOUTH SURGERY      SEVERAL WISDOM TEETH EXTRACTED   • MOUTH SURGERY      ORAL IMPLANT   • TONSILLECTOMY      AT AGE 5     Family History   Problem Relation Age of Onset   • Prostate cancer Father    • Colon cancer Paternal Grandmother    • Breast cancer Neg Hx    • Ovarian cancer Neg Hx      Social History     Tobacco Use   • Smoking status: Never Smoker   • Smokeless tobacco: Never Used   Substance Use Topics   • Alcohol use: No     Comment: RARELY DRINKS, 2-3 X MONTHLY       Current Outpatient Medications:   •  lisinopril (PRINIVIL,ZESTRIL) 20 MG tablet, Take 20 mg by mouth Daily., Disp: , Rfl:   •  sertraline (ZOLOFT) 100 MG tablet, Take 100 mg by mouth Daily., Disp: , Rfl:     Allergies   Allergen Reactions   • Codeine Nausea And Vomiting and Dizziness     /78   Pulse 76   Temp 97.5 °F (36.4 °C)   Resp 16   Ht 157.5 cm (62\")   Wt 91.2 kg (201 lb)   BMI 36.76 kg/m²     Physical Exam   Constitutional: She is oriented to person, " place, and time. She appears well-developed and well-nourished. No distress.   HENT:   Head: Normocephalic and atraumatic.   Right Ear: Hearing and external ear normal.   Left Ear: Hearing and external ear normal.   Nose: Nose normal.   Mouth/Throat: Oropharynx is clear and moist and mucous membranes are normal. Mucous membranes are not pale, not dry and not cyanotic. No oral lesions. No oropharyngeal exudate.   Eyes: Conjunctivae and EOM are normal. Right eye exhibits no discharge. Left eye exhibits no discharge.   Neck: Trachea normal. Neck supple. No JVD present. No edema present. No thyroid mass and no thyromegaly present.   Cardiovascular: Normal rate, regular rhythm, S2 normal and normal heart sounds. Exam reveals no gallop, no S3 and no friction rub.   No murmur heard.  Pulmonary/Chest: Effort normal and breath sounds normal. No respiratory distress. She exhibits no tenderness.   Abdominal: Normal appearance and bowel sounds are normal. She exhibits no distension, no ascites and no mass. There is no splenomegaly or hepatomegaly. There is no tenderness. There is no rigidity, no rebound and no guarding. No hernia.     Vascular Status -  Her right foot exhibits no edema. Her left foot exhibits no edema.  Lymphadenopathy:     She has no cervical adenopathy.        Left: No supraclavicular adenopathy present.   Neurological: She is alert and oriented to person, place, and time. She has normal strength. No cranial nerve deficit or sensory deficit.   Skin: No rash noted. She is not diaphoretic. No cyanosis. No pallor. Nails show no clubbing.   Psychiatric: She has a normal mood and affect.   Nursing note and vitals reviewed.  Stigmata of chronic liver disease:  None.  Asterixis:  None.    Laboratory Tests:   Upon review of records:     Dated 6/17/2019 glucose 101 BUN 17 creatinine 0.87 sodium 143 potassium 3.9 chloride 100 CO2 23 calcium 10.1 albumin 4.8 total bilirubin 0.4 alkaline phosphatase 75 AST 17 ALT  15    Dated 7/30/2019 glucose 107 BUN 12 creatinine 0.6 sodium 141 potassium 4.2 chloride 104 CO2 28 calcium 9.5 albumin 4.3 ALT 12 AST 14 alkaline phosphatase 67 total bilirubin 0.5 WBC 6.74 hemoglobin 15.2 hematocrit 47.2 platelet count 300 MCV 91.7    Abdominal Imaging:  Upon review of records:    Gallbladder ultrasound dated 7/9/2019 reveals limited images of the pancreas are unremarkable. The liver parenchyma is normal in echogenicity. Stones are present within the gallbladder. There is no gallbladder wall thickening. There is no pericholecystic fluid. The common duct measures 4.40 mm. Limited images of the right kidney are unremarkable.    Procedures:  Upon review of records:    Colonoscopy dated 12/03/2015: Scant early diverticular change and left colon. Colon polyps. Internal hemorrhoids. No endoscopic evidence of ileitis or colitis was seen. Random biopsies were obtained from the colon upon withdrawal of the scope. Hepatic flexure polyp, biopsy revealed multiple fragments of tubular adenoma, no high-grade dysplasia present. Transverse colon polyp, biopsy revealed multiple fragments of tubular adenoma, no high-grade dysplasia present. Cecum polyp, biopsy revealed mild nonspecific reactive hyperplasia. No polyp structures, adenomatous change or inflammation present. Sigmoid colon polyp, biopsy revealed 2 hyperplastic polyps without atypia. Random colon biopsies revealed moderate reactive changes, mild epithelial lymphocytosis. Features suggestive but not diagnostic of microscopic colitis, lymphocytic type.    Assessment:      ICD-10-CM ICD-9-CM   1. Colon cancer screening Z12.11 V76.51     Plan/  Patient Instructions   1. Colonoscopy: Description of the procedure, risks, benefits, alternatives and options, including nonoperative options, were discussed with the patient in detail. The patient understands and wishes to proceed.     Zohaib Dodge, APRN

## 2019-08-23 DIAGNOSIS — M25.551 ARTHRALGIA OF RIGHT HIP: Primary | ICD-10-CM

## 2019-08-23 DIAGNOSIS — M25.561 ARTHRALGIA OF KNEE, RIGHT: ICD-10-CM

## 2019-08-26 ENCOUNTER — OFFICE VISIT (OUTPATIENT)
Dept: ORTHOPEDIC SURGERY | Facility: CLINIC | Age: 69
End: 2019-08-26

## 2019-08-26 VITALS — HEIGHT: 62 IN | WEIGHT: 201 LBS | BODY MASS INDEX: 36.99 KG/M2 | RESPIRATION RATE: 18 BRPM

## 2019-08-26 DIAGNOSIS — M25.551 ARTHRALGIA OF RIGHT HIP: Primary | ICD-10-CM

## 2019-08-26 DIAGNOSIS — M25.561 ARTHRALGIA OF RIGHT KNEE: ICD-10-CM

## 2019-08-26 PROCEDURE — 73560 X-RAY EXAM OF KNEE 1 OR 2: CPT | Performed by: ORTHOPAEDIC SURGERY

## 2019-08-26 PROCEDURE — 99202 OFFICE O/P NEW SF 15 MIN: CPT | Performed by: ORTHOPAEDIC SURGERY

## 2019-08-26 PROCEDURE — 73502 X-RAY EXAM HIP UNI 2-3 VIEWS: CPT | Performed by: ORTHOPAEDIC SURGERY

## 2019-09-04 ENCOUNTER — TRANSCRIBE ORDERS (OUTPATIENT)
Dept: ADMINISTRATIVE | Facility: HOSPITAL | Age: 69
End: 2019-09-04

## 2019-09-04 DIAGNOSIS — Z12.31 VISIT FOR SCREENING MAMMOGRAM: Primary | ICD-10-CM

## 2019-09-20 ENCOUNTER — APPOINTMENT (OUTPATIENT)
Dept: GENERAL RADIOLOGY | Facility: HOSPITAL | Age: 69
End: 2019-09-20

## 2019-09-20 ENCOUNTER — HOSPITAL ENCOUNTER (OUTPATIENT)
Dept: GENERAL RADIOLOGY | Facility: HOSPITAL | Age: 69
Discharge: HOME | End: 2019-09-20
Admitting: ORTHOPAEDIC SURGERY

## 2019-09-20 PROCEDURE — 77002 NEEDLE LOCALIZATION BY XRAY: CPT

## 2019-09-20 PROCEDURE — 25010000003 LIDOCAINE 1 % SOLUTION: Performed by: ORTHOPAEDIC SURGERY

## 2019-09-20 PROCEDURE — 25010000002 METHYLPREDNISOLONE PER 80 MG: Performed by: ORTHOPAEDIC SURGERY

## 2019-09-20 RX ORDER — LIDOCAINE HYDROCHLORIDE 10 MG/ML
10 INJECTION, SOLUTION INFILTRATION; PERINEURAL ONCE
Status: COMPLETED | OUTPATIENT
Start: 2019-09-20 | End: 2019-09-20

## 2019-09-20 RX ORDER — METHYLPREDNISOLONE ACETATE 80 MG/ML
80 INJECTION, SUSPENSION INTRA-ARTICULAR; INTRALESIONAL; INTRAMUSCULAR; SOFT TISSUE ONCE
Status: COMPLETED | OUTPATIENT
Start: 2019-09-20 | End: 2019-09-20

## 2019-09-20 RX ADMIN — LIDOCAINE HYDROCHLORIDE 9 ML: 10 INJECTION, SOLUTION INFILTRATION; PERINEURAL at 12:33

## 2019-09-20 RX ADMIN — METHYLPREDNISOLONE ACETATE 80 MG: 80 INJECTION, SUSPENSION INTRA-ARTICULAR; INTRALESIONAL; INTRAMUSCULAR; SOFT TISSUE at 12:31

## 2019-09-20 NOTE — POST-PROCEDURE NOTE
[facility]  [address], [address]  [place] [address]  [phone]        PROCEDURE REPORT        DIAGNOSIS:  Right hip osteoarthritis, symptomatic    PROCEDURE: Right  hip injection under flouroscopy      Elli Edwards with date of birth 1950 presents to [facility] Radiology Department today for injection therapy.        Patient presents to [facility] Radiology Department Flouroscopy Suite on 9/20/2019 for planned elective right hip injection under flouroscopy for symptomatic osteoarthritis.    Procedure:     After consent was obtained, and using ethyl chloride topical local anesthetic, the right hip was then prepped and draped with sterile technique. With an anterior hip approach, flouroscopy guidance, and care to stay lateral of the femoral artery, the hip joint was entered via a 20 gauge spinal needle.  A mixture of 80 mg methylprednisolone in one ml plus 9 ml of 1% plain Lidocaine was injected and the needle withdrawn. The procedure was well tolerated and without complication. The patient noted relief of focal hip joint pain.  The patient did remain stable and with baseline ambulation. The patient is asked to rest the joint for a few more days before resuming full regular activities. It may be painful for the first few days. Watch for fever, skin issues, increased swelling or persistent pain in the joint. Call or return to clinic if such symptoms occur, other concerns or if there is lack of improvement as anticipated.    Impression: Symptomatic right hip osteoarthritis.      Recommendations/Plan:      Treatment and patient advice as noted here and in office visit report.  Orthopedic activities reviewed and patient expressed appreciation.  Discussion of orthopedic goals.   Risk, benefits, and merits of treatment options reviewed and questions answered.  Call or notify for any adverse effect from injection therapy.    Exercise: As tolerated.  No strenuous  activity for a few days as appropriate.  Brace:  No brace was given at today's visit  Referral: No referrals made at today's visit  Studies: No additional studies ordered.  Surgery: No surgery proposed at this visit.  Activity:  May perform usual activities as tolerated.      Patient will return to our clinic at scheduled appointment.  Patient agreeable to call or return sooner for any concerns.

## 2019-09-27 PROBLEM — Z12.11 COLON CANCER SCREENING: Status: ACTIVE | Noted: 2019-09-27

## 2019-10-01 ENCOUNTER — HOSPITAL ENCOUNTER (OUTPATIENT)
Dept: MAMMOGRAPHY | Facility: HOSPITAL | Age: 69
Discharge: HOME | End: 2019-10-01
Admitting: INTERNAL MEDICINE

## 2019-10-01 DIAGNOSIS — Z12.31 VISIT FOR SCREENING MAMMOGRAM: ICD-10-CM

## 2019-10-01 PROCEDURE — 77063 BREAST TOMOSYNTHESIS BI: CPT | Performed by: RADIOLOGY

## 2019-10-01 PROCEDURE — 77067 SCR MAMMO BI INCL CAD: CPT

## 2019-10-01 PROCEDURE — 77063 BREAST TOMOSYNTHESIS BI: CPT

## 2019-10-01 PROCEDURE — 77067 SCR MAMMO BI INCL CAD: CPT | Performed by: RADIOLOGY

## 2019-10-07 RX ORDER — PHENOL 1.4 %
600 AEROSOL, SPRAY (ML) MUCOUS MEMBRANE DAILY
COMMUNITY

## 2019-10-07 RX ORDER — MELATONIN
1000 DAILY
COMMUNITY

## 2019-10-08 ENCOUNTER — ANESTHESIA (OUTPATIENT)
Dept: GASTROENTEROLOGY | Facility: HOSPITAL | Age: 69
End: 2019-10-08

## 2019-10-08 ENCOUNTER — ANESTHESIA EVENT (OUTPATIENT)
Dept: GASTROENTEROLOGY | Facility: HOSPITAL | Age: 69
End: 2019-10-08

## 2019-10-08 ENCOUNTER — HOSPITAL ENCOUNTER (OUTPATIENT)
Facility: HOSPITAL | Age: 69
Setting detail: HOSPITAL OUTPATIENT SURGERY
Discharge: HOME | End: 2019-10-08
Attending: INTERNAL MEDICINE | Admitting: INTERNAL MEDICINE

## 2019-10-08 VITALS
SYSTOLIC BLOOD PRESSURE: 121 MMHG | OXYGEN SATURATION: 98 % | DIASTOLIC BLOOD PRESSURE: 67 MMHG | RESPIRATION RATE: 18 BRPM | WEIGHT: 200 LBS | TEMPERATURE: 97.3 F | BODY MASS INDEX: 36.8 KG/M2 | HEART RATE: 63 BPM | HEIGHT: 62 IN

## 2019-10-08 DIAGNOSIS — Z12.11 COLON CANCER SCREENING: ICD-10-CM

## 2019-10-08 PROCEDURE — 25010000002 PROPOFOL 200 MG/20ML EMULSION: Performed by: NURSE ANESTHETIST, CERTIFIED REGISTERED

## 2019-10-08 PROCEDURE — 45380 COLONOSCOPY AND BIOPSY: CPT | Performed by: INTERNAL MEDICINE

## 2019-10-08 PROCEDURE — S0260 H&P FOR SURGERY: HCPCS | Performed by: INTERNAL MEDICINE

## 2019-10-08 PROCEDURE — 45385 COLONOSCOPY W/LESION REMOVAL: CPT | Performed by: INTERNAL MEDICINE

## 2019-10-08 RX ORDER — SIMETHICONE 20 MG/.3ML
EMULSION ORAL AS NEEDED
Status: DISCONTINUED | OUTPATIENT
Start: 2019-10-08 | End: 2019-10-08 | Stop reason: HOSPADM

## 2019-10-08 RX ORDER — SODIUM CHLORIDE 9 MG/ML
70 INJECTION, SOLUTION INTRAVENOUS CONTINUOUS PRN
Status: DISCONTINUED | OUTPATIENT
Start: 2019-10-08 | End: 2019-10-08 | Stop reason: HOSPADM

## 2019-10-08 RX ORDER — LIDOCAINE 50 MG/G
OINTMENT TOPICAL AS NEEDED
Status: DISCONTINUED | OUTPATIENT
Start: 2019-10-08 | End: 2019-10-08 | Stop reason: HOSPADM

## 2019-10-08 RX ORDER — PROPOFOL 10 MG/ML
INJECTION, EMULSION INTRAVENOUS AS NEEDED
Status: DISCONTINUED | OUTPATIENT
Start: 2019-10-08 | End: 2019-10-08 | Stop reason: SURG

## 2019-10-08 RX ADMIN — PROPOFOL 50 MG: 10 INJECTION, EMULSION INTRAVENOUS at 09:10

## 2019-10-08 RX ADMIN — SODIUM CHLORIDE 70 ML/HR: 9 INJECTION, SOLUTION INTRAVENOUS at 08:22

## 2019-10-08 RX ADMIN — PROPOFOL 100 MG: 10 INJECTION, EMULSION INTRAVENOUS at 09:01

## 2019-10-08 RX ADMIN — PROPOFOL 50 MG: 10 INJECTION, EMULSION INTRAVENOUS at 09:15

## 2019-10-08 RX ADMIN — PROPOFOL 50 MG: 10 INJECTION, EMULSION INTRAVENOUS at 09:35

## 2019-10-08 RX ADMIN — PROPOFOL 100 MG: 10 INJECTION, EMULSION INTRAVENOUS at 09:05

## 2019-10-08 RX ADMIN — PROPOFOL 50 MG: 10 INJECTION, EMULSION INTRAVENOUS at 09:30

## 2019-10-08 RX ADMIN — PROPOFOL 50 MG: 10 INJECTION, EMULSION INTRAVENOUS at 09:25

## 2019-10-08 NOTE — OP NOTE
PROCEDURE:  Colonoscopy to the terminal ileum with one cold snare and 4 cold biopsy polypectomies.    DATE OF PROCEDURE:  October 8, 2019    REFERRING PROVIDER:  Elli Trinh MD     INSTRUMENT USED:  Olympus PCF H 190 videocolonoscope.      INDICATIONS OF THE PROCEDURE: This is a 68-year-old white female for colon cancer screening.  There is no first-degree relatives with colon cancer.  However the patient's grand mother has colon cancer.    PREVIOUS COLONOSCOPY: 2015.   multiple colon polyps.  Somewhat low prep score.    BIOPSIES: Ascending colon: One cold snare and one cold biopsy polypectomies.  Transverse colon: One cold biopsy polypectomy.  Mid to transverse colon: One cold biopsy polypectomy.  Descending colon: One cold biopsy polypectomy.  Sigmoid colon biopsies-previously marked area with Lucia ink.      PHOTOGRAPHS:  Photographs were included in the medical records.     MEDICATIONS:  MAC.       CONSENT/PREPROCEDURE EVALUATION:  Risks, benefits, alternatives and options of the procedure including risks of sedation/anesthesia were discussed with the patient and informed consent was obtained prior to the procedure.  History and physical examination were performed and nothing precluded the test.      REPORT:  The patient was placed in left lateral decubitus position and a digital examination was performed.  Once under the influence of IV sedation, the instrument was inserted into the rectum and advanced under direct vision to cecum which was identified by the ileocecal valve, triradiate folds and appendiceal orifice. The scope was then maneuvered into the terminal ileum.        FINDINGS:      Digital rectal examination:  Good anal tone.  No perianal pathology.  No mass.        Terminal ileum:  7-8 cm.  Normal.     Cecum and ascending colon: 2, 3-5 mm sessile polyps were noted in the ascending colon.  One was removed with cold biopsy forceps and one with cold snare.     Hepatic flexure, transverse colon,  splenic flexure: 2, 3 mm sessile polyps were noted one in the proximal transverse colon and one in the mid transverse colon.  These were removed with cold biopsy forceps..         Descending colon, sigmoid colon and rectum: Scant early diverticular change was seen in the left colon.  A 3 mm sessile polyp in the descending colon was removed with cold biopsy forceps.  In the sigmoid colon in the area of marking with Lucia ink was noted.  Somewhat polypoid appearance of the mucosa was seen.  Biopsies were obtained.  A retroflex examination within the rectum revealed internal hemorrhoids and hypertrophic anal papilla.        The scope was then straightened, the lower GI tract was decompressed, and the scope was pulled out of the patient.  The patient tolerated the procedure well.  There were no immediate complications and the patient was transferred in stable condition for post procedure observation.      TECHNICAL DATA:   1. [place] prep score: 8 (3+3+2).    2. Anus to cecal time: 3  minutes.  3. Difficulty of examination:  Average.  The colon was noted to be tortuous, spastic scantily diverticular change in the left colon.  4. Colon polyps.  And redundant  5. Withdrawal time: 8 minutes.  6. Procedure time:  minutes  7. Retroflex examination in right colon: Limited attempt was unsuccessful.  Redundant colon.    8. Second look Rectum to cecum with decompression: Yes.    DIAGNOSES:    1. Scant early diverticular change in the left colon.  2. 5 were removed.  3-5 mm in size.  3. Internal hemorrhoids and hypertrophic anal papilla.    RECOMMENDATIONS:     1. Dietary instructions.  2. Follow biopsies.    3. Follow-up:    3 to 4 weeks.  4. Followup colonoscopy:  5 years.          Thank you very much for letting me participate in the care of this patient. Please do not hesitate to call me if you have any questions.

## 2019-10-08 NOTE — H&P
Chief complaint:  Colon CA Screen    History of present illness: Colon Cancer Screen, Last Colonoscopy 2015, Family History of Colon CA (GM),  Hx Colon polyp    Past medical history:   Past Medical History:   Diagnosis Date   • Arthritis    • Cataract     PT REPORTS BILATERAL EYES   • Colon polyp 12/03/2015   • Depression    • Diverticulosis    • Elevated cholesterol    • H/O cardiovascular stress test 10/07/2019    10+ years ago    • Hearing loss     Patient reported no use of hearing aids   • Hypertension    • Localized osteoarthritis of right knee    • Seasonal allergies    • Wears glasses        Surgical history:    Past Surgical History:   Procedure Laterality Date   • ADENOIDECTOMY     • CATARACT EXTRACTION Bilateral    • CATARACT EXTRACTION W/ INTRAOCULAR LENS IMPLANT Right 2/2/2017    Procedure: RIGHT CATARACT PHACO EXTRACTION WITH INTRAOCULAR LENS IMPLANT;  Surgeon: Miky Garza MD;  Location: [facility] OR;  Service:    • CATARACT EXTRACTION W/ INTRAOCULAR LENS IMPLANT Left 2/16/2017    Procedure: LEFT CATARACT PHACO EXTRACTION WITH INTRAOCULAR LENS IMPLANT;  Surgeon: Miky Garza MD;  Location: [facility] OR;  Service:    • CHOLECYSTECTOMY N/A 8/6/2019    Procedure: CHOLECYSTECTOMY LAPAROSCOPIC;  Surgeon: Oliver Aguilar MD;  Location:  [facility] OR;  Service: General   • COLONOSCOPY  2016   • ENDOSCOPY     • LASER ABLATION      cervical   • MOUTH SURGERY      SEVERAL WISDOM TEETH EXTRACTED   • MOUTH SURGERY      ORAL IMPLANT   • TONSILLECTOMY      AT AGE 5       Social history:  Social History     Socioeconomic History   • Marital status:      Spouse name: Not on file   • Number of children: Not on file   • Years of education: Not on file   • Highest education level: Not on file   Occupational History   • Occupation: retired     Employer: [employer]     Comment: worked for [employer] office in [facility]   Tobacco Use   • Smoking status: Never Smoker   • Smokeless tobacco: Never Used   Substance and Sexual  "Activity   • Alcohol use: No     Comment: RARELY DRINKS, 2-3 X MONTHLY   • Drug use: No   • Sexual activity: Defer   Social History Narrative    Right hand dominant       Allergies:  Codeine  Food Allergies:  Yes  Latex allergy: None  Contrast allergy: None    Medications:  Medications Prior to Admission   Medication Sig Dispense Refill Last Dose   • calcium carbonate (OS-ZEESHAN) 600 MG tablet Take 600 mg by mouth Daily.   10/8/2019 at 0530   • cholecalciferol (VITAMIN D3) 1000 units tablet Take 1,000 Units by mouth Daily.   10/8/2019 at 0530   • lisinopril (PRINIVIL,ZESTRIL) 20 MG tablet Take 20 mg by mouth Daily.   10/8/2019 at 0530   • sertraline (ZOLOFT) 100 MG tablet Take 100 mg by mouth Daily.   10/8/2019 at 0530       Review of systems:   Constitutional: No recent: Fever, Weight loss  Respiratory: No recent: SOB, Cough  Cardiovascular: No recent: Chest Pains, congestive heart failure or arrhythmias. Neurological: No recent: Seizures, CVA, TIA.   Genitourinary: No recent: Renal Failure, UTI.  Endocrine: No recent: Worsening of diabetes or thyroid disease.  Musculoskeletal: No recent: Joint swelling.  Hem. Oncology: No recent: Anemia or bleeding.  Psychiatric: No recent: Worsening of depression or anxiety.     VITAL SIGNS:    Blood pressure 126/78, pulse 78, temperature 98.5 °F (36.9 °C), temperature source Temporal, resp. rate 16, height 157.5 cm (62\"), weight 90.7 kg (200 lb), SpO2 95 %, not currently breastfeeding.    PHYSICAL EXAMINATION:   HEENT: Normal.   Lungs: Clear to auscultation.  Heart: No S3, no murmur.    Abdomen: Soft.  BS+ ND, NT  Extremities: No edema.  No cyanosis.  Neuro: Alert X 3. No focal deficit.    Assessment: Colon Cancer Screen, Last Colonoscopy 2015, Family History of Colon CA (GM),  Hx Colon polyp    Plan:   Colonoscopy    Risks/Benefits:  The potential benefits, risk and/or side effects of the procedure and alternatives have been discussed with the patient/authorized representative and " questions were answered.

## 2019-10-08 NOTE — ANESTHESIA POSTPROCEDURE EVALUATION
Patient: Elli Edwards    Procedure Summary     Date:  10/08/19 Room / Location:  [facility] ENDOSCOPY 2 / [facility] ENDOSCOPY    Anesthesia Start:  0858 Anesthesia Stop:  0935    Procedure:  COLONOSCOPY WITH POLYPECTOMY (N/A Anus) Diagnosis:       Colon cancer screening      (Colon cancer screening [Z12.11])    Surgeon:  Lemuel Sagastume MD Provider:  Bryson Newton CRNA    Anesthesia Type:  MAC ASA Status:  2          Anesthesia Type: MAC  Last vitals  BP   121/67 (10/08/19 1014)   Temp   97.3 °F (36.3 °C) (10/08/19 0944)   Pulse   63 (10/08/19 1014)   Resp   18 (10/08/19 1014)     SpO2   98 % (10/08/19 1014)     Post Anesthesia Care and Evaluation    Patient location during evaluation: bedside  Patient participation: complete - patient participated  Level of consciousness: awake  Pain score: 0  Pain management: adequate  Airway patency: patent  Anesthetic complications: No anesthetic complications  PONV Status: controlled  Cardiovascular status: acceptable and stable  Respiratory status: acceptable and room air  Hydration status: acceptable

## 2019-10-08 NOTE — DISCHARGE INSTRUCTIONS
Rest today  No pushing, pulling, tugging, heavy lifting, or strenuous activity   No major decision making, driving, or drinking alcoholic beverages for 24 hours due to the sedation you received  Always use good hand hygiene/washing technique  No driving on pain medication.    To assist you in voiding:  Drink plenty of fluids  Listen to running water while attempting to void.    If you are unable to urinate and you have an uncomfortable urge to void or it has been   6 hours since you were discharged, return to the Emergency Room    ************************************************************************************************  Postprocedure instructions:    1. Nothing by mouth to fully alert.  2. Once fully alert may have clear liquid diet.  3. Advance diet as tolerated.  4. Vital signs as routine.    Diet:   1. Avoid Dairy Products.  2. Kelly's All Bran-Bran Buds 1/4 cup daily.  3. May add Honey Bunches of Oats with Almonds 1/4 cup for taste.  4. Use Soy milk or [place] milk or Fair Life Milk.    Blood Thinner Directions:  Avoid Aspirin & other NSAIDS for _7__ days. Tylenol is okay.    Treatments:      Other Instructions:    Call [facility] at [phone] or come to the Emergency Department if you experience the following: Chest pain, abdominal pain, bleeding (vomiting of blood or coffee colored material, black stools or kortney blood in stools), fever/chills, nausea and vomiting or dizziness.    Follow-up:  DR. CRIS MAN in 4 weeks.Office phone # [phone].  May call in 1 week regarding biopsy results.    Follow-up colonoscopy: in 5 years.  ************************************************************************************    Notes to the patient and the family from your Doctor    Dear patient/family member,    Today your colon was examined extensively from rectum to cecum and beyond into the small intestine twice.   Findings on today's procedure are as follows:    1. Scant diverticulosis.  These are benign outpouchings in the colon.   2. Colon polyps. 5 were found and removed.   3. No cancer. No inflammation or colitis. No suggestion of Crohn's disease.    Recommendations:  As above.    Should you have more questions please do not hesitate to talk to the nurse who can call me and let me talk to you.  I hope you feel better.    Lemuel Sagastume M.D., FACP, FACG.

## 2019-10-08 NOTE — ANESTHESIA PREPROCEDURE EVALUATION
Anesthesia Evaluation     Patient summary reviewed and Nursing notes reviewed   no history of anesthetic complications:  NPO Solid Status: > 8 hours  NPO Liquid Status: > 8 hours           Airway   Mallampati: I  TM distance: >3 FB  Neck ROM: full  no difficulty expected  Dental - normal exam     Pulmonary - negative pulmonary ROS and normal exam   Cardiovascular - normal exam    (+) hypertension, hyperlipidemia,       Neuro/Psych- negative ROS  GI/Hepatic/Renal/Endo - negative ROS     Musculoskeletal (-) negative ROS    Abdominal    Substance History - negative use     OB/GYN negative ob/gyn ROS         Other - negative ROS                       Anesthesia Plan    ASA 2     MAC     intravenous induction   Anesthetic plan, all risks, benefits, and alternatives have been provided, discussed and informed consent has been obtained with: patient.

## 2019-10-11 LAB
LAB AP CASE REPORT: NORMAL
PATH REPORT.FINAL DX SPEC: NORMAL

## 2019-11-11 ENCOUNTER — OFFICE VISIT (OUTPATIENT)
Dept: GASTROENTEROLOGY | Facility: CLINIC | Age: 69
End: 2019-11-11

## 2019-11-11 VITALS
WEIGHT: 204 LBS | HEART RATE: 80 BPM | DIASTOLIC BLOOD PRESSURE: 88 MMHG | TEMPERATURE: 98.7 F | SYSTOLIC BLOOD PRESSURE: 148 MMHG | HEIGHT: 62 IN | BODY MASS INDEX: 37.54 KG/M2 | RESPIRATION RATE: 16 BRPM

## 2019-11-11 DIAGNOSIS — K64.8 INTERNAL HEMORRHOIDS: Chronic | ICD-10-CM

## 2019-11-11 DIAGNOSIS — K57.30 DIVERTICULOSIS OF COLON: Primary | Chronic | ICD-10-CM

## 2019-11-11 DIAGNOSIS — K63.5 POLYP OF COLON, UNSPECIFIED PART OF COLON, UNSPECIFIED TYPE: Chronic | ICD-10-CM

## 2019-11-11 PROBLEM — R19.7 DIARRHEA: Chronic | Status: ACTIVE | Noted: 2019-11-11

## 2019-11-11 PROBLEM — Z12.11 COLON CANCER SCREENING: Status: RESOLVED | Noted: 2019-09-27 | Resolved: 2019-11-11

## 2019-11-11 PROCEDURE — 99213 OFFICE O/P EST LOW 20 MIN: CPT | Performed by: NURSE PRACTITIONER

## 2019-11-11 NOTE — PATIENT INSTRUCTIONS
1. High fiber, low fat diet with liberal water intake.   2. Follow up colonoscopy in 5 years.  3. Follow up: The patient will call back

## 2019-11-11 NOTE — PROGRESS NOTES
Chief Complaint   Patient presents with   • Follow-up     Colonoscopy      History of Present Illness     The patient is here for follow up to discuss colonoscopy results. She has been doing well. The patient denies recent change in bowel habits. There is no diarrhea or constipation. There is no history of abdominal pain. There is no history of overt GI bleed (hematemesis melena or hematochezia). The patient denies nausea or vomiting. There is no history of reflux. The patient denies dysphagia or odynophagia. There is no history of recent significant weight loss. There is no history of liver disease in the past.    Review of Systems   Constitutional: Negative for appetite change, chills, fatigue, fever and unexpected weight change.   HENT: Negative for mouth sores, nosebleeds and trouble swallowing.    Eyes: Negative for discharge and redness.   Respiratory: Negative for apnea, cough and shortness of breath.    Cardiovascular: Negative for chest pain, palpitations and leg swelling.   Gastrointestinal: Negative for abdominal distention, abdominal pain, anal bleeding, blood in stool, constipation, diarrhea, nausea and vomiting.   Endocrine: Negative for cold intolerance, heat intolerance and polydipsia.   Genitourinary: Negative for dysuria, hematuria and urgency.   Musculoskeletal: Negative for arthralgias, joint swelling and myalgias.   Skin: Negative for rash.   Allergic/Immunologic: Negative for food allergies and immunocompromised state.   Neurological: Negative for dizziness, seizures, syncope and headaches.   Hematological: Negative for adenopathy. Does not bruise/bleed easily.   Psychiatric/Behavioral: Negative for dysphoric mood. The patient is not nervous/anxious and is not hyperactive.      Patient Active Problem List   Diagnosis   • Benign essential hypertension   • Polyp of colon   • Heartburn   • Hyperlipidemia   • Phlebitis   • Vertigo   • Vitamin D deficiency   • Internal hemorrhoids   • Diverticulosis  of colon   • Diarrhea     Past Medical History:   Diagnosis Date   • Arthritis    • Cataract     PT REPORTS BILATERAL EYES   • Colon polyp 12/03/2015   • Colon polyps 10/08/2019   • Depression    • Diverticulosis    • Elevated cholesterol    • H/O cardiovascular stress test 10/07/2019    10+ years ago    • Hearing loss     Patient reported no use of hearing aids   • Hypertension    • Localized osteoarthritis of right knee    • Seasonal allergies    • Wears glasses      Past Surgical History:   Procedure Laterality Date   • ADENOIDECTOMY     • CATARACT EXTRACTION Bilateral    • CATARACT EXTRACTION W/ INTRAOCULAR LENS IMPLANT Right 2/2/2017    Procedure: RIGHT CATARACT PHACO EXTRACTION WITH INTRAOCULAR LENS IMPLANT;  Surgeon: Miky Garza MD;  Location: [facility] OR;  Service:    • CATARACT EXTRACTION W/ INTRAOCULAR LENS IMPLANT Left 2/16/2017    Procedure: LEFT CATARACT PHACO EXTRACTION WITH INTRAOCULAR LENS IMPLANT;  Surgeon: Miky Garza MD;  Location: [facility] OR;  Service:    • CHOLECYSTECTOMY N/A 8/6/2019    Procedure: CHOLECYSTECTOMY LAPAROSCOPIC;  Surgeon: Oliver Aguilar MD;  Location: [facility] OR;  Service: General   • COLONOSCOPY  2016   • COLONOSCOPY N/A 10/8/2019    Procedure: COLONOSCOPY WITH POLYPECTOMY;  Surgeon: Lemuel Sagastume MD;  Location: [facility] ENDOSCOPY;  Service: Gastroenterology   • ENDOSCOPY     • LASER ABLATION      cervical   • MOUTH SURGERY      SEVERAL WISDOM TEETH EXTRACTED   • MOUTH SURGERY      ORAL IMPLANT   • TONSILLECTOMY      AT AGE 5     Family History   Problem Relation Age of Onset   • Prostate cancer Father    • Colon cancer Paternal Grandmother    • Hyperlipidemia Other    • Thyroid cancer Mother    • Breast cancer Neg Hx    • Ovarian cancer Neg Hx      Social History     Tobacco Use   • Smoking status: Never Smoker   • Smokeless tobacco: Never Used   Substance Use Topics   • Alcohol use: No     Comment: RARELY DRINKS, 2-3 X MONTHLY       Current Outpatient Medications:   •  calcium  "carbonate (OS-ZEESHAN) 600 MG tablet, Take 600 mg by mouth Daily., Disp: , Rfl:   •  cholecalciferol (VITAMIN D3) 1000 units tablet, Take 1,000 Units by mouth Daily., Disp: , Rfl:   •  lisinopril (PRINIVIL,ZESTRIL) 20 MG tablet, Take 20 mg by mouth Daily., Disp: , Rfl:   •  sertraline (ZOLOFT) 100 MG tablet, Take 100 mg by mouth Daily., Disp: , Rfl:     Allergies   Allergen Reactions   • Codeine Nausea And Vomiting and Dizziness     Blood pressure 148/88, pulse 80, temperature 98.7 °F (37.1 °C), resp. rate 16, height 157.5 cm (62\"), weight 92.5 kg (204 lb), not currently breastfeeding.    Physical Exam   Constitutional: She is oriented to person, place, and time. She appears well-developed and well-nourished. No distress.   HENT:   Head: Normocephalic and atraumatic.   Right Ear: Hearing and external ear normal.   Left Ear: Hearing and external ear normal.   Nose: Nose normal.   Mouth/Throat: Oropharynx is clear and moist and mucous membranes are normal. Mucous membranes are not pale, not dry and not cyanotic. No oral lesions. No oropharyngeal exudate.   Eyes: Conjunctivae and EOM are normal. Right eye exhibits no discharge. Left eye exhibits no discharge.   Neck: Trachea normal. Neck supple. No JVD present. No edema present. No thyroid mass and no thyromegaly present.   Cardiovascular: Normal rate, regular rhythm, S2 normal and normal heart sounds. Exam reveals no gallop, no S3 and no friction rub.   No murmur heard.  Pulmonary/Chest: Effort normal and breath sounds normal. No respiratory distress. She exhibits no tenderness.   Abdominal: Normal appearance and bowel sounds are normal. She exhibits no distension, no ascites and no mass. There is no splenomegaly or hepatomegaly. There is no tenderness. There is no rigidity, no rebound and no guarding. No hernia.     Vascular Status -  Her right foot exhibits no edema. Her left foot exhibits no edema.  Lymphadenopathy:     She has no cervical adenopathy.        Left: No " supraclavicular adenopathy present.   Neurological: She is alert and oriented to person, place, and time. She has normal strength. No cranial nerve deficit or sensory deficit.   Skin: No rash noted. She is not diaphoretic. No cyanosis. No pallor. Nails show no clubbing.   Psychiatric: She has a normal mood and affect.   Nursing note and vitals reviewed.  Stigmata of chronic liver disease:  None.  Asterixis:  None.    Laboratory Tests:   Upon review of records:     Dated 6/17/2019 glucose 101 BUN 17 creatinine 0.87 sodium 143 potassium 3.9 chloride 100 CO2 23 calcium 10.1 albumin 4.8 total bilirubin 0.4 alkaline phosphatase 75 AST 17 ALT 15     Dated 7/30/2019 glucose 107 BUN 12 creatinine 0.6 sodium 141 potassium 4.2 chloride 104 CO2 28 calcium 9.5 albumin 4.3 ALT 12 AST 14 alkaline phosphatase 67 total bilirubin 0.5 WBC 6.74 hemoglobin 15.2 hematocrit 47.2 platelet count 300 MCV 91.7     Abdominal Imaging:  Upon review of records:     Gallbladder ultrasound dated 7/9/2019 reveals limited images of the pancreas are unremarkable. The liver parenchyma is normal in echogenicity. Stones are present within the gallbladder. There is no gallbladder wall thickening. There is no pericholecystic fluid. The common duct measures 4.40 mm. Limited images of the right kidney are unremarkable.     Procedures:  Upon review of records:     Colonoscopy dated 12/03/2015: Scant early diverticular change and left colon. Colon polyps. Internal hemorrhoids. No endoscopic evidence of ileitis or colitis was seen. Random biopsies were obtained from the colon upon withdrawal of the scope. Hepatic flexure polyp, biopsy revealed multiple fragments of tubular adenoma, no high-grade dysplasia present. Transverse colon polyp, biopsy revealed multiple fragments of tubular adenoma, no high-grade dysplasia present. Cecum polyp, biopsy revealed mild nonspecific reactive hyperplasia. No polyp structures, adenomatous change or inflammation present.  Sigmoid colon polyp, biopsy revealed 2 hyperplastic polyps without atypia. Random colon biopsies revealed moderate reactive changes, mild epithelial lymphocytosis. Features suggestive but not diagnostic of microscopic colitis, lymphocytic type.    Colonoscopy dated 10/8/2019 reveals scant early diverticular change in the left colon.  Colon polyps.  5 were removed.  3 to 5 mm in size.  Internal hemorrhoids and hypertrophic anal papilla.  Ascending colon polyp biopsy reveals tubular adenoma.  No high-grade dysplasia.  Transverse colon polyp biopsy reveals tubular adenoma.  No high-grade dysplasia.  A sending colon polyp jar #2 biopsy reveals tubular adenoma.  No high-grade dysplasia.  Sigmoid colon biopsy reveals colonic mucosa with submucosal-like pigment; findings suggestive of previous tattoo.  No adenomatous or hyperplastic changes.  Descending colon polyp biopsy reveals hyperplastic polyp.  Transverse colon polyp mid biopsy reveals findings suggestive of traditional serrated adenoma.  No high-grade dysplasia.    Assessment:      ICD-10-CM ICD-9-CM   1. Diverticulosis of colon K57.30 562.10   2. Polyp of colon, unspecified part of colon, unspecified type K63.5 211.3   3. Internal hemorrhoids K64.8 455.0       Plan/  Patient Instructions   1. High fiber, low fat diet with liberal water intake.   2. Follow up colonoscopy in 5 years.  3. Follow up: The patient will call back     ALHAJI Barth

## 2019-12-31 ENCOUNTER — TELEPHONE (OUTPATIENT)
Dept: ORTHOPEDIC SURGERY | Facility: CLINIC | Age: 69
End: 2019-12-31

## 2019-12-31 DIAGNOSIS — M25.551 ARTHRALGIA OF RIGHT HIP: Primary | ICD-10-CM

## 2020-01-17 ENCOUNTER — HOSPITAL ENCOUNTER (OUTPATIENT)
Dept: GENERAL RADIOLOGY | Facility: HOSPITAL | Age: 70
Discharge: HOME | End: 2020-01-17
Admitting: ORTHOPAEDIC SURGERY

## 2020-01-17 PROCEDURE — 25010000003 LIDOCAINE 1 % SOLUTION: Performed by: ORTHOPAEDIC SURGERY

## 2020-01-17 PROCEDURE — 25010000002 METHYLPREDNISOLONE PER 80 MG: Performed by: ORTHOPAEDIC SURGERY

## 2020-01-17 PROCEDURE — 77002 NEEDLE LOCALIZATION BY XRAY: CPT

## 2020-01-17 RX ORDER — METHYLPREDNISOLONE ACETATE 80 MG/ML
80 INJECTION, SUSPENSION INTRA-ARTICULAR; INTRALESIONAL; INTRAMUSCULAR; SOFT TISSUE ONCE
Status: COMPLETED | OUTPATIENT
Start: 2020-01-17 | End: 2020-01-17

## 2020-01-17 RX ORDER — LIDOCAINE HYDROCHLORIDE 10 MG/ML
9 INJECTION, SOLUTION INFILTRATION; PERINEURAL ONCE
Status: COMPLETED | OUTPATIENT
Start: 2020-01-17 | End: 2020-01-17

## 2020-01-17 RX ADMIN — LIDOCAINE HYDROCHLORIDE 9 ML: 10 INJECTION, SOLUTION INFILTRATION; PERINEURAL at 15:38

## 2020-01-17 RX ADMIN — METHYLPREDNISOLONE ACETATE 80 MG: 80 INJECTION, SUSPENSION INTRA-ARTICULAR; INTRALESIONAL; INTRAMUSCULAR; SOFT TISSUE at 15:38

## 2020-01-17 NOTE — POST-PROCEDURE NOTE
[facility]  [address], [address]  [place] [address]  [phone]        PROCEDURE REPORT        DIAGNOSIS:  Right hip osteoarthritis, symptomatic    PROCEDURE: Right  hip injection under flouroscopy      lEli Edwards with date of birth 1950 presents to [facility] Radiology Department today for injection therapy.        Patient presents to [facility] Radiology Department Flouroscopy Suite on 1/17/2020 for planned elective right hip injection under flouroscopy for symptomatic osteoarthritis.    Procedure:     After consent was obtained, and using ethyl chloride topical local anesthetic, the right hip was then prepped and draped with sterile technique. With an anterior hip approach, flouroscopy guidance, and care to stay lateral of the femoral artery, the hip joint was entered via a 20 gauge spinal needle.  A mixture of 80 mg methylprednisolone in one ml plus 9 ml of 1% plain Lidocaine was injected and the needle withdrawn. The procedure was well tolerated and without complication. The patient noted relief of focal hip joint pain.  The patient did remain stable and with baseline ambulation. The patient is asked to rest the joint for a few more days before resuming full regular activities. It may be painful for the first few days. Watch for fever, skin issues, increased swelling or persistent pain in the joint. Call or return to clinic if such symptoms occur, other concerns or if there is lack of improvement as anticipated.    Impression: Symptomatic right hip osteoarthritis.      Recommendations/Plan:      Treatment and patient advice as noted here and in office visit report.  Orthopedic activities reviewed and patient expressed appreciation.  Discussion of orthopedic goals.   Risk, benefits, and merits of treatment options reviewed and questions answered.  Call or notify for any adverse effect from injection therapy.    Exercise: As tolerated.  No strenuous  activity for a few days as appropriate.  Brace:  No brace was given at today's visit  Referral: No referrals made at today's visit  Studies: No additional studies ordered.  Surgery: No surgery proposed at this visit.  Activity:  May perform usual activities as tolerated.      Patient will return to our clinic at scheduled appointment.  Patient agreeable to call or return sooner for any concerns.

## 2020-07-08 ENCOUNTER — OFFICE VISIT (OUTPATIENT)
Dept: ORTHOPEDIC SURGERY | Facility: CLINIC | Age: 70
End: 2020-07-08

## 2020-07-08 VITALS — BODY MASS INDEX: 37.54 KG/M2 | WEIGHT: 204 LBS | RESPIRATION RATE: 16 BRPM | HEIGHT: 62 IN

## 2020-07-08 DIAGNOSIS — M25.551 ARTHRALGIA OF RIGHT HIP: Primary | ICD-10-CM

## 2020-07-08 DIAGNOSIS — M16.11 PRIMARY OSTEOARTHRITIS OF RIGHT HIP: ICD-10-CM

## 2020-07-08 PROCEDURE — S0260 H&P FOR SURGERY: HCPCS | Performed by: PHYSICIAN ASSISTANT

## 2020-07-08 NOTE — PROGRESS NOTES
"Elli Edwards is a 69 y.o.  female   Follow-up of the Right Hip (Here today for F/U of Rt hip pain. Wants to discuss hip replacement)        CHIEF COMPLAINT:    \" right anterior hip pain\"    History of Present Illness      Patient presents with continued chronic anterior right hip pain.  There is been no injury or trauma.  Patient states the pain has been ongoing for several years.  She has tried over-the-counter analgesics, fluoroscopy guided hip injections, rest, heat and ice without improvement.  She states the right hip feels like it \"wants to come out\"  The pain is interfering with her daily activities to get in and out of a vehicle, walk up or down steps, lay flat in bed and walk.      PAST MEDICAL HISTORY:    Past Medical History:   Diagnosis Date   • Arthritis    • Cataract     PT REPORTS BILATERAL EYES   • Colon polyp 12/03/2015   • Colon polyps 10/08/2019   • Depression    • Diverticulosis    • Elevated cholesterol    • H/O cardiovascular stress test 10/07/2019    10+ years ago    • Hearing loss     Patient reported no use of hearing aids   • Hypertension    • Localized osteoarthritis of right knee    • Seasonal allergies    • Wears glasses          Current Outpatient Medications:   •  calcium carbonate (OS-ZEESHAN) 600 MG tablet, Take 600 mg by mouth Daily., Disp: , Rfl:   •  cholecalciferol (VITAMIN D3) 1000 units tablet, Take 1,000 Units by mouth Daily., Disp: , Rfl:   •  lisinopril (PRINIVIL,ZESTRIL) 20 MG tablet, Take 20 mg by mouth Daily., Disp: , Rfl:   •  sertraline (ZOLOFT) 100 MG tablet, Take 100 mg by mouth Daily., Disp: , Rfl:     Past Surgical History:   Procedure Laterality Date   • ADENOIDECTOMY     • CATARACT EXTRACTION Bilateral    • CATARACT EXTRACTION W/ INTRAOCULAR LENS IMPLANT Right 2/2/2017    Procedure: RIGHT CATARACT PHACO EXTRACTION WITH INTRAOCULAR LENS IMPLANT;  Surgeon: Miky Garza MD;  Location: [facility];  Service:    • CATARACT EXTRACTION W/ INTRAOCULAR LENS IMPLANT Left " 2/16/2017    Procedure: LEFT CATARACT PHACO EXTRACTION WITH INTRAOCULAR LENS IMPLANT;  Surgeon: Miky Garza MD;  Location:  [facility] OR;  Service:    • CHOLECYSTECTOMY N/A 8/6/2019    Procedure: CHOLECYSTECTOMY LAPAROSCOPIC;  Surgeon: Oliver Aguilar MD;  Location:  [facility] OR;  Service: General   • COLONOSCOPY  2016   • COLONOSCOPY N/A 10/8/2019    Procedure: COLONOSCOPY WITH POLYPECTOMY;  Surgeon: Lemuel Sagastume MD;  Location: [facility] ENDOSCOPY;  Service: Gastroenterology   • ENDOSCOPY     • LASER ABLATION      cervical   • MOUTH SURGERY      SEVERAL WISDOM TEETH EXTRACTED   • MOUTH SURGERY      ORAL IMPLANT   • TONSILLECTOMY      AT AGE 5       Family History   Problem Relation Age of Onset   • Prostate cancer Father    • Colon cancer Paternal Grandmother    • Hyperlipidemia Other    • Thyroid cancer Mother    • Breast cancer Neg Hx    • Ovarian cancer Neg Hx        Social History     Socioeconomic History   • Marital status:      Spouse name: Not on file   • Number of children: Not on file   • Years of education: Not on file   • Highest education level: Not on file   Occupational History   • Occupation: retired     Employer: [employer]     Comment: worked for [employer] office in [facility] [employer]   Tobacco Use   • Smoking status: Never Smoker   • Smokeless tobacco: Never Used   Substance and Sexual Activity   • Alcohol use: No     Comment: RARELY DRINKS, 2-3 X MONTHLY   • Drug use: No   • Sexual activity: Defer   Social History Narrative    Right hand dominant        Allergies   Allergen Reactions   • Codeine Nausea And Vomiting and Dizziness       Review of Systems   Constitutional: Negative.    HENT: Negative.    Eyes: Negative.    Respiratory: Negative.    Cardiovascular: Negative.    Gastrointestinal: Negative.    Endocrine: Negative.    Genitourinary: Negative.    Musculoskeletal: Positive for arthralgias (right hip).   Neurological: Negative for numbness.   Hematological: Negative.   "  Psychiatric/Behavioral: Negative.    All other systems reviewed and are negative.      I have reviewed the medical and surgical history, family history, social history, medications, and/or allergies, and the review of systems of this report.    PHYSICAL EXAMINATION:       Resp 16   Ht 157.5 cm (62\")   Wt 92.5 kg (204 lb)   LMP  (LMP Unknown)   BMI 37.31 kg/m²     GENERAL [x] Well developed  []Ill appearing [x] No acute distress    HEENT [x]No acute changes [x] Normocephalic, atraumatic    NECK [x]Supple  [] No midline tenderness    LUNGS [x]Clear bilaterally [x]No wheezes []Rhonchi [] Rales    HEART [x] Regular rate  [x] Regular rhythm [] Irregular    ABDOMEN [x] Soft [x] Not tender [x] Not distended [x] Normal sounds    VAS/EXT [x] Normal Pulses []Edema []Cyanosis             SKIN [x] Warm [x]Dry []Pink []Ecchymosis []Cool    NEURO [x] Sensation Intact [x] Motor Intact [x] Pulse intact  [] Dysesthesias:_________  []Weakness:__________    MUSCULOSKELETAL []          Physical Exam   Constitutional: She is oriented to person, place, and time. She appears well-developed and well-nourished.   HENT:   Head: Normocephalic.   Neck: No tracheal deviation present.   Cardiovascular: Normal rate.   Pulmonary/Chest: Effort normal. No respiratory distress.   Abdominal: She exhibits no distension.   Musculoskeletal:        Right hip: She exhibits decreased range of motion, decreased strength, tenderness, bony tenderness and crepitus.   Neurological: She is alert and oriented to person, place, and time.   Psychiatric: She has a normal mood and affect.   Nursing note and vitals reviewed.    Right Hip Exam     Tenderness   The patient is experiencing tenderness in the anterior.    Range of Motion   Abduction: 25   Adduction: 15   Flexion: 80     Muscle Strength   Flexion: 4/5     Tests   ESTELLE: positive  Fadir:  Positive FADIR test    Other   Sensation: normal  Pulse: present          Extremity DVT signs are negative on " physical exam with negative Markie sign, no calf pain, no palpable cords and no skin tone change   Neurologic Exam     Mental Status   Oriented to person, place, and time.           DVT Screening: No Yes   Smoker [x] []   Personal/Family History of DVT or PE [x] []   Sedentary Lifestyle [x] []   BMI >30 [] [x]      Tranexamic acid TXA criteria for usage during arthroplasty surgery.    Previous or Active DVT/PE: NO  Cardiac Stent within 1 year: NO  Embolic/Ischemic stroke within 1 year: NO  Creatinine less than 30ml/min: NO  Congenital Thrombophilia: NO  Hypersensitivity to TXA: NO  Color Blindness: NO  NOTE:  TXA  tranexemic acid summary: THIS PATIENT IS A CANDIDATE FOR IV TXA DURING SURGERY.     Independent Review of Radiographic Studies:    Prior right hip x-ray imaging August 2019 reveals severe right hip degenerative changes with periarticular spurring.  No acute fracture noted          Elli was seen today for follow-up and follow-up.    Diagnoses and all orders for this visit:    Arthralgia of right hip    Primary osteoarthritis of right hip         SPECIAL INSTRUCTIONS:  Multi-modal analgesia.  Tranexamic acid IV protocol (see screening parameters this report).  Multi-modal DVT prophylaxis.  Rehabilitation PT/OT protocol with same day walker ambulation with therapist.      Risks, benefits, and alternative treatments discussed with the patient: [x] Yes [] No    Risk benefits and merits of the proposed surgery were discussed and the patient's questions were answered risks discussed including and not limited to:  Anesthesia reactions  Blood loss and possible transfusion  Infection  Deep venous thrombosis and pulmonary embolus  Nerve, vascular or tendon injury  Fracture  Deformity  Stiffness  Weakness  Prosthesis and implant issues such as loosening, material wear or dislocation  Skin necrosis  Revision surgery or further treatment  Recurrence of problem and condition     Informed consent: [] Signed  [x] To be  obtained at hospital  [] Both    Recommendations/Plan:  The nature of the proposed surgery reviewed with the patient including risks, benefits, rehabilitation, recovery timeframe, and outcome expectations      Ace was reviewed.  Rx agreement was signed and is placed on file    PLANNED SURGICAL PROCEDURE: Right OLI      Patient is encouraged and agreeable to call or return sooner for any issues or concerns.

## 2020-07-08 NOTE — PROGRESS NOTES
Subjective   Patient ID: Elli Edwards is a 69 y.o. {16TC Arm Dominance:58682} female  Follow-up of the Right Hip (Here today for F/U of Rt hip pain. Wants to discuss hip replacement) and Follow-up of the Left Knee (Here today for F/U of bilateral knee pain. States Lt knee is worse)         History of Present Illness    Pain Score: 4  Pain Location: Hip  Pain Orientation: Right     Pain Descriptors: Aching  Pain Frequency: Intermittent        Clinical Progression: Gradually worsening  Aggravating Factors: Exercise                   Past Medical History:   Diagnosis Date   • Arthritis    • Cataract     PT REPORTS BILATERAL EYES   • Colon polyp 12/03/2015   • Colon polyps 10/08/2019   • Depression    • Diverticulosis    • Elevated cholesterol    • H/O cardiovascular stress test 10/07/2019    10+ years ago    • Hearing loss     Patient reported no use of hearing aids   • Hypertension    • Localized osteoarthritis of right knee    • Seasonal allergies    • Wears glasses         Past Surgical History:   Procedure Laterality Date   • ADENOIDECTOMY     • CATARACT EXTRACTION Bilateral    • CATARACT EXTRACTION W/ INTRAOCULAR LENS IMPLANT Right 2/2/2017    Procedure: RIGHT CATARACT PHACO EXTRACTION WITH INTRAOCULAR LENS IMPLANT;  Surgeon: Miky Garza MD;  Location: [facility] OR;  Service:    • CATARACT EXTRACTION W/ INTRAOCULAR LENS IMPLANT Left 2/16/2017    Procedure: LEFT CATARACT PHACO EXTRACTION WITH INTRAOCULAR LENS IMPLANT;  Surgeon: Miky Garza MD;  Location: [facility] OR;  Service:    • CHOLECYSTECTOMY N/A 8/6/2019    Procedure: CHOLECYSTECTOMY LAPAROSCOPIC;  Surgeon: Oliver Aguilar MD;  Location: [facility] OR;  Service: General   • COLONOSCOPY  2016   • COLONOSCOPY N/A 10/8/2019    Procedure: COLONOSCOPY WITH POLYPECTOMY;  Surgeon: Lemuel Sagastume MD;  Location: [facility] ENDOSCOPY;  Service: Gastroenterology   • ENDOSCOPY     • LASER ABLATION      cervical   • MOUTH SURGERY      SEVERAL WISDOM TEETH EXTRACTED   • MOUTH  SURGERY      ORAL IMPLANT   • TONSILLECTOMY      AT AGE 5       Family History   Problem Relation Age of Onset   • Prostate cancer Father    • Colon cancer Paternal Grandmother    • Hyperlipidemia Other    • Thyroid cancer Mother    • Breast cancer Neg Hx    • Ovarian cancer Neg Hx        Social History     Socioeconomic History   • Marital status:      Spouse name: Not on file   • Number of children: Not on file   • Years of education: Not on file   • Highest education level: Not on file   Occupational History   • Occupation: retired     Employer: [employer]     Comment: worked for [employer] office in [place] [facility]   Tobacco Use   • Smoking status: Never Smoker   • Smokeless tobacco: Never Used   Substance and Sexual Activity   • Alcohol use: No     Comment: RARELY DRINKS, 2-3 X MONTHLY   • Drug use: No   • Sexual activity: Defer   Social History Narrative    Right hand dominant         Current Outpatient Medications:   •  calcium carbonate (OS-ZEESHAN) 600 MG tablet, Take 600 mg by mouth Daily., Disp: , Rfl:   •  cholecalciferol (VITAMIN D3) 1000 units tablet, Take 1,000 Units by mouth Daily., Disp: , Rfl:   •  lisinopril (PRINIVIL,ZESTRIL) 20 MG tablet, Take 20 mg by mouth Daily., Disp: , Rfl:   •  sertraline (ZOLOFT) 100 MG tablet, Take 100 mg by mouth Daily., Disp: , Rfl:     Allergies   Allergen Reactions   • Codeine Nausea And Vomiting and Dizziness       Review of Systems   Constitutional: Negative for diaphoresis, fever and unexpected weight change.   HENT: Negative for dental problem and sore throat.    Eyes: Negative for visual disturbance.   Respiratory: Negative for shortness of breath.    Cardiovascular: Negative for chest pain.   Gastrointestinal: Negative for abdominal pain, constipation, diarrhea, nausea and vomiting.   Genitourinary: Negative for difficulty urinating and frequency.   Musculoskeletal: Negative for arthralgias and gait problem.   Neurological: Negative for headaches.  "  Hematological: Does not bruise/bleed easily.   All other systems reviewed and are negative.      {Hx Review:84252::\"I have reviewed the medical and surgical history, family history, social history, medications, and/or allergies, and the review of systems of this report.\"}    Objective   Resp 16   Ht 157.5 cm (62\")   Wt 92.5 kg (204 lb)   LMP  (LMP Unknown)   BMI 37.31 kg/m²    Physical Exam  Ortho Exam   Extremity DVT signs are {16TC Markie:05591}   Neurologic Exam   [unfilled]         Assessment/Plan   Independent Review of Radiographic Studies:    {TC Imagin}      Procedures       There are no diagnoses linked to this encounter.   {16TC Patient Advice:63121::\"Regular exercise as tolerated\",\"Orthopedic activities reviewed and patient expressed appreciation\",\"Discussion of orthopedic goals\",\"Risk, benefits, and merits of treatment alternatives reviewed with the patient and questions answered\"}    Recommendations/Plan:  {16TC Recommendations/Plan:26664::\"Exercise, medications, injections, other patient advice, and return appointment as noted.\",\"Patient is encouraged to call or return for any issues or concerns.\"}    No follow-ups on file.  Patient agreeable to call or return sooner for any concerns.    {DCNFX:98808::\"Discussed treatment options at length with the patient today.  Patient elects to proceed with closed treatment of the fracture at this point given the nondisplaced nature of the fracture.  Patient is agreeable to recurrent follow-ups to assess for radiographic evaluation of the fracture to ensure no interval displacement occurs.  Patient verbalizes their understanding that with closed treatment there is a risk for both nonunion as well as malunion and a risk for late displacement which could require surgical intervention at that point.\"}           EMR Dragon-transcription disclaimer:  This encounter note is an electronic transcription of spoken language to printed text.  Electronic " transcription of spoken language may permit erroneous or at times nonsensical words or phrases to be inadvertently transcribed.  Although I have reviewed the note for such errors, some may still exist

## 2020-07-22 ENCOUNTER — PREP FOR SURGERY (OUTPATIENT)
Dept: OTHER | Facility: HOSPITAL | Age: 70
End: 2020-07-22

## 2020-07-22 DIAGNOSIS — M16.11 PRIMARY OSTEOARTHRITIS OF RIGHT HIP: ICD-10-CM

## 2020-07-22 DIAGNOSIS — M25.551 ARTHRALGIA OF RIGHT HIP: Primary | ICD-10-CM

## 2020-07-22 RX ORDER — CEFAZOLIN SODIUM 2 G/50ML
2 SOLUTION INTRAVENOUS
Status: CANCELLED | OUTPATIENT
Start: 2020-07-23 | End: 2020-07-24

## 2020-07-28 ENCOUNTER — HOSPITAL ENCOUNTER (OUTPATIENT)
Dept: GENERAL RADIOLOGY | Facility: HOSPITAL | Age: 70
Discharge: HOME | End: 2020-07-28
Admitting: PHYSICIAN ASSISTANT

## 2020-07-28 ENCOUNTER — APPOINTMENT (OUTPATIENT)
Dept: PREADMISSION TESTING | Facility: HOSPITAL | Age: 70
End: 2020-07-28

## 2020-07-28 VITALS — HEIGHT: 62 IN | WEIGHT: 205.2 LBS | BODY MASS INDEX: 37.76 KG/M2

## 2020-07-28 DIAGNOSIS — M16.11 PRIMARY OSTEOARTHRITIS OF RIGHT HIP: ICD-10-CM

## 2020-07-28 DIAGNOSIS — M25.551 ARTHRALGIA OF RIGHT HIP: ICD-10-CM

## 2020-07-28 LAB
ABO GROUP BLD: NORMAL
ALBUMIN SERPL-MCNC: 4.3 G/DL (ref 3.5–5.2)
ALBUMIN/GLOB SERPL: 1.4 G/DL
ALP SERPL-CCNC: 83 U/L (ref 39–117)
ALT SERPL W P-5'-P-CCNC: 16 U/L (ref 1–33)
ANION GAP SERPL CALCULATED.3IONS-SCNC: 10.9 MMOL/L (ref 5–15)
APTT PPP: 28.3 SECONDS (ref 24.5–37.2)
AST SERPL-CCNC: 20 U/L (ref 1–32)
BASOPHILS # BLD AUTO: 0.07 10*3/MM3 (ref 0–0.2)
BASOPHILS NFR BLD AUTO: 0.9 % (ref 0–1.5)
BILIRUB SERPL-MCNC: 0.6 MG/DL (ref 0–1.2)
BILIRUB UR QL STRIP: NEGATIVE
BUN SERPL-MCNC: 16 MG/DL (ref 8–23)
BUN/CREAT SERPL: 22.2 (ref 7–25)
CALCIUM SPEC-SCNC: 9.7 MG/DL (ref 8.6–10.5)
CHLORIDE SERPL-SCNC: 101 MMOL/L (ref 98–107)
CLARITY UR: CLEAR
CO2 SERPL-SCNC: 29.1 MMOL/L (ref 22–29)
COLOR UR: YELLOW
CREAT SERPL-MCNC: 0.72 MG/DL (ref 0.57–1)
DEPRECATED RDW RBC AUTO: 43.8 FL (ref 37–54)
EOSINOPHIL # BLD AUTO: 0.28 10*3/MM3 (ref 0–0.4)
EOSINOPHIL NFR BLD AUTO: 3.7 % (ref 0.3–6.2)
ERYTHROCYTE [DISTWIDTH] IN BLOOD BY AUTOMATED COUNT: 13 % (ref 12.3–15.4)
GFR SERPL CREATININE-BSD FRML MDRD: 80 ML/MIN/1.73
GLOBULIN UR ELPH-MCNC: 3 GM/DL
GLUCOSE SERPL-MCNC: 109 MG/DL (ref 65–99)
GLUCOSE UR STRIP-MCNC: NEGATIVE MG/DL
HBA1C MFR BLD: 5.7 % (ref 4.8–5.6)
HCT VFR BLD AUTO: 47.6 % (ref 34–46.6)
HGB BLD-MCNC: 15.6 G/DL (ref 12–15.9)
HGB UR QL STRIP.AUTO: NEGATIVE
IMM GRANULOCYTES # BLD AUTO: 0.03 10*3/MM3 (ref 0–0.05)
IMM GRANULOCYTES NFR BLD AUTO: 0.4 % (ref 0–0.5)
INR PPP: 0.9 (ref 0.9–1.1)
KETONES UR QL STRIP: NEGATIVE
LEUKOCYTE ESTERASE UR QL STRIP.AUTO: NEGATIVE
LYMPHOCYTES # BLD AUTO: 2.21 10*3/MM3 (ref 0.7–3.1)
LYMPHOCYTES NFR BLD AUTO: 29.4 % (ref 19.6–45.3)
MCH RBC QN AUTO: 30.1 PG (ref 26.6–33)
MCHC RBC AUTO-ENTMCNC: 32.8 G/DL (ref 31.5–35.7)
MCV RBC AUTO: 91.7 FL (ref 79–97)
MONOCYTES # BLD AUTO: 0.67 10*3/MM3 (ref 0.1–0.9)
MONOCYTES NFR BLD AUTO: 8.9 % (ref 5–12)
NEUTROPHILS NFR BLD AUTO: 4.26 10*3/MM3 (ref 1.7–7)
NEUTROPHILS NFR BLD AUTO: 56.7 % (ref 42.7–76)
NITRITE UR QL STRIP: NEGATIVE
NRBC BLD AUTO-RTO: 0 /100 WBC (ref 0–0.2)
PH UR STRIP.AUTO: <=5 [PH] (ref 5–8)
PLATELET # BLD AUTO: 285 10*3/MM3 (ref 140–450)
PMV BLD AUTO: 11.5 FL (ref 6–12)
POTASSIUM SERPL-SCNC: 3.5 MMOL/L (ref 3.5–5.2)
PROT SERPL-MCNC: 7.3 G/DL (ref 6–8.5)
PROT UR QL STRIP: NEGATIVE
PROTHROMBIN TIME: 12.5 SECONDS (ref 12–15.1)
RBC # BLD AUTO: 5.19 10*6/MM3 (ref 3.77–5.28)
RH BLD: POSITIVE
SODIUM SERPL-SCNC: 141 MMOL/L (ref 136–145)
SP GR UR STRIP: 1.02 (ref 1–1.03)
UROBILINOGEN UR QL STRIP: NORMAL
WBC # BLD AUTO: 7.52 10*3/MM3 (ref 3.4–10.8)

## 2020-07-28 PROCEDURE — 83036 HEMOGLOBIN GLYCOSYLATED A1C: CPT | Performed by: PHYSICIAN ASSISTANT

## 2020-07-28 PROCEDURE — 71046 X-RAY EXAM CHEST 2 VIEWS: CPT

## 2020-07-28 PROCEDURE — 93005 ELECTROCARDIOGRAM TRACING: CPT

## 2020-07-28 PROCEDURE — 36415 COLL VENOUS BLD VENIPUNCTURE: CPT

## 2020-07-28 PROCEDURE — 80053 COMPREHEN METABOLIC PANEL: CPT | Performed by: PHYSICIAN ASSISTANT

## 2020-07-28 PROCEDURE — 86900 BLOOD TYPING SEROLOGIC ABO: CPT | Performed by: ORTHOPAEDIC SURGERY

## 2020-07-28 PROCEDURE — 86901 BLOOD TYPING SEROLOGIC RH(D): CPT | Performed by: ORTHOPAEDIC SURGERY

## 2020-07-28 PROCEDURE — 85025 COMPLETE CBC W/AUTO DIFF WBC: CPT | Performed by: PHYSICIAN ASSISTANT

## 2020-07-28 PROCEDURE — 85730 THROMBOPLASTIN TIME PARTIAL: CPT | Performed by: PHYSICIAN ASSISTANT

## 2020-07-28 PROCEDURE — 87081 CULTURE SCREEN ONLY: CPT | Performed by: PHYSICIAN ASSISTANT

## 2020-07-28 PROCEDURE — 81003 URINALYSIS AUTO W/O SCOPE: CPT | Performed by: PHYSICIAN ASSISTANT

## 2020-07-28 PROCEDURE — 85610 PROTHROMBIN TIME: CPT | Performed by: PHYSICIAN ASSISTANT

## 2020-07-28 RX ORDER — CHLORTHALIDONE 25 MG/1
25 TABLET ORAL DAILY
COMMUNITY
End: 2020-08-07 | Stop reason: HOSPADM

## 2020-07-28 RX ORDER — ATORVASTATIN CALCIUM 40 MG/1
40 TABLET, FILM COATED ORAL DAILY
COMMUNITY
End: 2023-02-02 | Stop reason: SDUPTHER

## 2020-07-28 ASSESSMENT — HOOS JR
HOOS JR SCORE: 12
HOOS JR SCORE: 52.965

## 2020-07-29 ENCOUNTER — TELEPHONE (OUTPATIENT)
Dept: SOCIAL WORK | Facility: HOSPITAL | Age: 70
End: 2020-07-29

## 2020-07-29 LAB — MRSA SPEC QL CULT: NORMAL

## 2020-07-29 NOTE — TELEPHONE ENCOUNTER
Pre surgery phone for for total knee replacement.   Patient informed that  She would be admitted under Observation status not inpatient due to this being considered and overnight outpatient procedure.    Patient lives with spouse,  Daughter and son in law lives downstairs  In the [place].  Patient has a walker with wheels.  Family will be able to assist after surgery.  Patient wishes to use [facility]  States had them in the past

## 2020-07-31 ENCOUNTER — LAB (OUTPATIENT)
Dept: LAB | Facility: HOSPITAL | Age: 70
End: 2020-07-31

## 2020-07-31 DIAGNOSIS — M25.551 ARTHRALGIA OF RIGHT HIP: ICD-10-CM

## 2020-07-31 DIAGNOSIS — M16.11 PRIMARY OSTEOARTHRITIS OF RIGHT HIP: ICD-10-CM

## 2020-07-31 PROCEDURE — U0004 COV-19 TEST NON-CDC HGH THRU: HCPCS

## 2020-07-31 PROCEDURE — U0002 COVID-19 LAB TEST NON-CDC: HCPCS

## 2020-07-31 PROCEDURE — C9803 HOPD COVID-19 SPEC COLLECT: HCPCS

## 2020-08-01 LAB
REF LAB TEST METHOD: NORMAL
SARS-COV-2 RNA RESP QL NAA+PROBE: NOT DETECTED

## 2020-08-04 ENCOUNTER — ANESTHESIA EVENT (OUTPATIENT)
Dept: PERIOP | Facility: HOSPITAL | Age: 70
End: 2020-08-04

## 2020-08-04 ENCOUNTER — HOSPITAL ENCOUNTER (OUTPATIENT)
Facility: HOSPITAL | Age: 70
Discharge: HOME HEALTH | End: 2020-08-07
Attending: ORTHOPAEDIC SURGERY | Admitting: ORTHOPAEDIC SURGERY

## 2020-08-04 ENCOUNTER — APPOINTMENT (OUTPATIENT)
Dept: GENERAL RADIOLOGY | Facility: HOSPITAL | Age: 70
End: 2020-08-04

## 2020-08-04 ENCOUNTER — ANESTHESIA (OUTPATIENT)
Dept: PERIOP | Facility: HOSPITAL | Age: 70
End: 2020-08-04

## 2020-08-04 ENCOUNTER — APPOINTMENT (OUTPATIENT)
Dept: ULTRASOUND IMAGING | Facility: HOSPITAL | Age: 70
End: 2020-08-04

## 2020-08-04 DIAGNOSIS — M16.11 PRIMARY OSTEOARTHRITIS OF RIGHT HIP: ICD-10-CM

## 2020-08-04 DIAGNOSIS — Z78.9 IMPAIRED MOBILITY AND ADLS: Primary | ICD-10-CM

## 2020-08-04 DIAGNOSIS — Z74.09 IMPAIRED MOBILITY AND ADLS: Primary | ICD-10-CM

## 2020-08-04 DIAGNOSIS — Z96.641 STATUS POST TOTAL HIP REPLACEMENT, RIGHT: ICD-10-CM

## 2020-08-04 DIAGNOSIS — M25.551 ARTHRALGIA OF RIGHT HIP: ICD-10-CM

## 2020-08-04 LAB
ABO GROUP BLD: NORMAL
BLD GP AB SCN SERPL QL: NEGATIVE
RH BLD: POSITIVE
T&S EXPIRATION DATE: NORMAL

## 2020-08-04 PROCEDURE — 97161 PT EVAL LOW COMPLEX 20 MIN: CPT

## 2020-08-04 PROCEDURE — A9270 NON-COVERED ITEM OR SERVICE: HCPCS | Performed by: ORTHOPAEDIC SURGERY

## 2020-08-04 PROCEDURE — 99219 PR INITIAL OBSERVATION CARE/DAY 50 MINUTES: CPT | Performed by: FAMILY MEDICINE

## 2020-08-04 PROCEDURE — 86901 BLOOD TYPING SEROLOGIC RH(D): CPT | Performed by: PHYSICIAN ASSISTANT

## 2020-08-04 PROCEDURE — C1776 JOINT DEVICE (IMPLANTABLE): HCPCS | Performed by: ORTHOPAEDIC SURGERY

## 2020-08-04 PROCEDURE — 25010000003 CEFAZOLIN PER 500 MG: Performed by: ORTHOPAEDIC SURGERY

## 2020-08-04 PROCEDURE — 72170 X-RAY EXAM OF PELVIS: CPT

## 2020-08-04 PROCEDURE — 63710000001 CHOLECALCIFEROL 25 MCG (1000 UT) TABLET: Performed by: ORTHOPAEDIC SURGERY

## 2020-08-04 PROCEDURE — 63710000001 CALCIUM 500 MG VITAMIN D 5 MCG (200 UT) 500-5 MG-MCG TABLET: Performed by: ORTHOPAEDIC SURGERY

## 2020-08-04 PROCEDURE — 25010000002 DEXAMETHASONE PER 1 MG: Performed by: NURSE ANESTHETIST, CERTIFIED REGISTERED

## 2020-08-04 PROCEDURE — 25010000002 PROPOFOL 200 MG/20ML EMULSION: Performed by: NURSE ANESTHETIST, CERTIFIED REGISTERED

## 2020-08-04 PROCEDURE — 86850 RBC ANTIBODY SCREEN: CPT | Performed by: PHYSICIAN ASSISTANT

## 2020-08-04 PROCEDURE — 88300 SURGICAL PATH GROSS: CPT | Performed by: ORTHOPAEDIC SURGERY

## 2020-08-04 PROCEDURE — 25010000003 CEFAZOLIN SODIUM-DEXTROSE 1-4 GM-%(50ML) RECONSTITUTED SOLUTION: Performed by: ORTHOPAEDIC SURGERY

## 2020-08-04 PROCEDURE — 25010000002 FENTANYL CITRATE (PF) 250 MCG/5ML SOLUTION: Performed by: NURSE ANESTHETIST, CERTIFIED REGISTERED

## 2020-08-04 PROCEDURE — 25010000002 HYDROMORPHONE 1 MG/ML SOLUTION

## 2020-08-04 PROCEDURE — 27130 TOTAL HIP ARTHROPLASTY: CPT | Performed by: ORTHOPAEDIC SURGERY

## 2020-08-04 PROCEDURE — 25010000002 ONDANSETRON PER 1 MG: Performed by: NURSE ANESTHETIST, CERTIFIED REGISTERED

## 2020-08-04 PROCEDURE — G0378 HOSPITAL OBSERVATION PER HR: HCPCS

## 2020-08-04 PROCEDURE — 63710000001 HYDROCODONE-ACETAMINOPHEN 7.5-325 MG TABLET: Performed by: ORTHOPAEDIC SURGERY

## 2020-08-04 PROCEDURE — 25010000002 HYDROMORPHONE PER 4 MG: Performed by: NURSE ANESTHETIST, CERTIFIED REGISTERED

## 2020-08-04 PROCEDURE — 25010000003 CEFAZOLIN SODIUM-DEXTROSE 2-3 GM-%(50ML) RECONSTITUTED SOLUTION: Performed by: PHYSICIAN ASSISTANT

## 2020-08-04 PROCEDURE — 86900 BLOOD TYPING SEROLOGIC ABO: CPT | Performed by: PHYSICIAN ASSISTANT

## 2020-08-04 DEVICE — SCRW ACET CORT TRILOGY S/TAP 6.5X30: Type: IMPLANTABLE DEVICE | Site: ACETABULUM | Status: FUNCTIONAL

## 2020-08-04 DEVICE — STEM FEM/HIP TAPERLOC COMPL DIST/REDUC PPS OFFST/STD SZ10: Type: IMPLANTABLE DEVICE | Site: HIP | Status: FUNCTIONAL

## 2020-08-04 DEVICE — TOTAL HIP PRIMARY: Type: IMPLANTABLE DEVICE | Site: ACETABULUM | Status: FUNCTIONAL

## 2020-08-04 DEVICE — SCRW ACET CORT TRILOGY S/TAP 6.5X20: Type: IMPLANTABLE DEVICE | Site: ACETABULUM | Status: FUNCTIONAL

## 2020-08-04 DEVICE — CAP CERAM HD HIP UPCHRG: Type: IMPLANTABLE DEVICE | Site: ACETABULUM | Status: FUNCTIONAL

## 2020-08-04 DEVICE — SHLL ACET OSSEOTI G7 3H SZD 50MM: Type: IMPLANTABLE DEVICE | Site: ACETABULUM | Status: FUNCTIONAL

## 2020-08-04 DEVICE — LINER ACET G7/LONGEVITY HI/WL HXPE SZD 36MM: Type: IMPLANTABLE DEVICE | Site: ACETABULUM | Status: FUNCTIONAL

## 2020-08-04 DEVICE — CAP HIP TM UPCHRG: Type: IMPLANTABLE DEVICE | Site: ACETABULUM | Status: FUNCTIONAL

## 2020-08-04 DEVICE — ADAPT HIP BIOLOX OPTN TYPE1 TPR STD: Type: IMPLANTABLE DEVICE | Site: HIP | Status: FUNCTIONAL

## 2020-08-04 DEVICE — HD FEM/HIP G7 BIOLOX/DELTA OPTN 36MM: Type: IMPLANTABLE DEVICE | Site: HIP | Status: FUNCTIONAL

## 2020-08-04 RX ORDER — ATORVASTATIN CALCIUM 40 MG/1
40 TABLET, FILM COATED ORAL DAILY
Status: DISCONTINUED | OUTPATIENT
Start: 2020-08-05 | End: 2020-08-07 | Stop reason: HOSPADM

## 2020-08-04 RX ORDER — ONDANSETRON 4 MG/1
4 TABLET, FILM COATED ORAL EVERY 6 HOURS PRN
Status: DISCONTINUED | OUTPATIENT
Start: 2020-08-04 | End: 2020-08-06

## 2020-08-04 RX ORDER — HYDROCODONE BITARTRATE AND ACETAMINOPHEN 7.5; 325 MG/1; MG/1
1 TABLET ORAL EVERY 4 HOURS PRN
Status: DISCONTINUED | OUTPATIENT
Start: 2020-08-04 | End: 2020-08-07 | Stop reason: HOSPADM

## 2020-08-04 RX ORDER — ONDANSETRON 2 MG/ML
INJECTION INTRAMUSCULAR; INTRAVENOUS AS NEEDED
Status: DISCONTINUED | OUTPATIENT
Start: 2020-08-04 | End: 2020-08-04 | Stop reason: SURG

## 2020-08-04 RX ORDER — EPHEDRINE SULFATE/0.9% NACL/PF 25 MG/5 ML
SYRINGE (ML) INTRAVENOUS AS NEEDED
Status: DISCONTINUED | OUTPATIENT
Start: 2020-08-04 | End: 2020-08-04 | Stop reason: SURG

## 2020-08-04 RX ORDER — HYDROMORPHONE HCL 110MG/55ML
PATIENT CONTROLLED ANALGESIA SYRINGE INTRAVENOUS AS NEEDED
Status: DISCONTINUED | OUTPATIENT
Start: 2020-08-04 | End: 2020-08-04 | Stop reason: SURG

## 2020-08-04 RX ORDER — SODIUM CHLORIDE 0.9 % (FLUSH) 0.9 %
1-10 SYRINGE (ML) INJECTION AS NEEDED
Status: DISCONTINUED | OUTPATIENT
Start: 2020-08-04 | End: 2020-08-07 | Stop reason: HOSPADM

## 2020-08-04 RX ORDER — CEFAZOLIN SODIUM 2 G/50ML
2 SOLUTION INTRAVENOUS ONCE
Status: COMPLETED | OUTPATIENT
Start: 2020-08-04 | End: 2020-08-04

## 2020-08-04 RX ORDER — CHLORTHALIDONE 25 MG/1
25 TABLET ORAL DAILY
Status: DISCONTINUED | OUTPATIENT
Start: 2020-08-05 | End: 2020-08-06

## 2020-08-04 RX ORDER — PROPOFOL 10 MG/ML
INJECTION, EMULSION INTRAVENOUS AS NEEDED
Status: DISCONTINUED | OUTPATIENT
Start: 2020-08-04 | End: 2020-08-04 | Stop reason: SURG

## 2020-08-04 RX ORDER — SODIUM CHLORIDE, SODIUM LACTATE, POTASSIUM CHLORIDE, CALCIUM CHLORIDE 600; 310; 30; 20 MG/100ML; MG/100ML; MG/100ML; MG/100ML
1000 INJECTION, SOLUTION INTRAVENOUS CONTINUOUS
Status: DISCONTINUED | OUTPATIENT
Start: 2020-08-04 | End: 2020-08-04

## 2020-08-04 RX ORDER — CEFAZOLIN SODIUM 2 G/50ML
2 SOLUTION INTRAVENOUS
Status: DISCONTINUED | OUTPATIENT
Start: 2020-08-04 | End: 2020-08-04

## 2020-08-04 RX ORDER — ROCURONIUM BROMIDE 10 MG/ML
INJECTION, SOLUTION INTRAVENOUS AS NEEDED
Status: DISCONTINUED | OUTPATIENT
Start: 2020-08-04 | End: 2020-08-04 | Stop reason: SURG

## 2020-08-04 RX ORDER — DEXAMETHASONE SODIUM PHOSPHATE 10 MG/ML
INJECTION, SOLUTION INTRAMUSCULAR; INTRAVENOUS
Status: DISPENSED
Start: 2020-08-04 | End: 2020-08-04

## 2020-08-04 RX ORDER — CEFAZOLIN SODIUM 1 G/50ML
1 SOLUTION INTRAVENOUS EVERY 8 HOURS
Status: COMPLETED | OUTPATIENT
Start: 2020-08-04 | End: 2020-08-05

## 2020-08-04 RX ORDER — LIDOCAINE HYDROCHLORIDE 20 MG/ML
INJECTION, SOLUTION INTRAVENOUS AS NEEDED
Status: DISCONTINUED | OUTPATIENT
Start: 2020-08-04 | End: 2020-08-04 | Stop reason: SURG

## 2020-08-04 RX ORDER — LISINOPRIL 20 MG/1
20 TABLET ORAL DAILY
Status: DISCONTINUED | OUTPATIENT
Start: 2020-08-05 | End: 2020-08-07 | Stop reason: HOSPADM

## 2020-08-04 RX ORDER — ONDANSETRON 2 MG/ML
4 INJECTION INTRAMUSCULAR; INTRAVENOUS EVERY 6 HOURS PRN
Status: DISCONTINUED | OUTPATIENT
Start: 2020-08-04 | End: 2020-08-06

## 2020-08-04 RX ORDER — NALOXONE HCL 0.4 MG/ML
0.1 VIAL (ML) INJECTION
Status: DISCONTINUED | OUTPATIENT
Start: 2020-08-04 | End: 2020-08-07 | Stop reason: HOSPADM

## 2020-08-04 RX ORDER — SODIUM CHLORIDE, SODIUM LACTATE, POTASSIUM CHLORIDE, CALCIUM CHLORIDE 600; 310; 30; 20 MG/100ML; MG/100ML; MG/100ML; MG/100ML
100 INJECTION, SOLUTION INTRAVENOUS CONTINUOUS
Status: DISCONTINUED | OUTPATIENT
Start: 2020-08-04 | End: 2020-08-05

## 2020-08-04 RX ORDER — VITAMIN B COMPLEX
1000 TABLET ORAL DAILY
Status: DISCONTINUED | OUTPATIENT
Start: 2020-08-04 | End: 2020-08-07 | Stop reason: HOSPADM

## 2020-08-04 RX ORDER — SODIUM CHLORIDE 0.9 % (FLUSH) 0.9 %
3 SYRINGE (ML) INJECTION EVERY 12 HOURS SCHEDULED
Status: DISCONTINUED | OUTPATIENT
Start: 2020-08-04 | End: 2020-08-07 | Stop reason: HOSPADM

## 2020-08-04 RX ORDER — SODIUM CHLORIDE 0.9 % (FLUSH) 0.9 %
10 SYRINGE (ML) INJECTION AS NEEDED
Status: DISCONTINUED | OUTPATIENT
Start: 2020-08-04 | End: 2020-08-04 | Stop reason: HOSPADM

## 2020-08-04 RX ORDER — BUPIVACAINE HYDROCHLORIDE 5 MG/ML
INJECTION, SOLUTION EPIDURAL; INTRACAUDAL
Status: DISPENSED
Start: 2020-08-04 | End: 2020-08-04

## 2020-08-04 RX ORDER — DEXAMETHASONE SODIUM PHOSPHATE 4 MG/ML
INJECTION, SOLUTION INTRA-ARTICULAR; INTRALESIONAL; INTRAMUSCULAR; INTRAVENOUS; SOFT TISSUE AS NEEDED
Status: DISCONTINUED | OUTPATIENT
Start: 2020-08-04 | End: 2020-08-04 | Stop reason: SURG

## 2020-08-04 RX ORDER — FENTANYL CITRATE 50 UG/ML
INJECTION, SOLUTION INTRAMUSCULAR; INTRAVENOUS AS NEEDED
Status: DISCONTINUED | OUTPATIENT
Start: 2020-08-04 | End: 2020-08-04 | Stop reason: SURG

## 2020-08-04 RX ADMIN — HYDROMORPHONE HYDROCHLORIDE 0.5 MG: 2 INJECTION, SOLUTION INTRAMUSCULAR; INTRAVENOUS; SUBCUTANEOUS at 10:40

## 2020-08-04 RX ADMIN — FENTANYL CITRATE 100 MCG: 50 INJECTION, SOLUTION INTRAMUSCULAR; INTRAVENOUS at 08:05

## 2020-08-04 RX ADMIN — SODIUM CHLORIDE, POTASSIUM CHLORIDE, SODIUM LACTATE AND CALCIUM CHLORIDE 100 ML/HR: 600; 310; 30; 20 INJECTION, SOLUTION INTRAVENOUS at 12:32

## 2020-08-04 RX ADMIN — CEFAZOLIN SODIUM 2 G: 2 SOLUTION INTRAVENOUS at 08:05

## 2020-08-04 RX ADMIN — Medication 0.5 MG: at 11:17

## 2020-08-04 RX ADMIN — DEXAMETHASONE SODIUM PHOSPHATE 8 MG: 4 INJECTION, SOLUTION INTRAMUSCULAR; INTRAVENOUS at 08:14

## 2020-08-04 RX ADMIN — HYDROMORPHONE HYDROCHLORIDE 0.5 MG: 2 INJECTION, SOLUTION INTRAMUSCULAR; INTRAVENOUS; SUBCUTANEOUS at 09:51

## 2020-08-04 RX ADMIN — ROCURONIUM BROMIDE 50 MG: 10 INJECTION INTRAVENOUS at 08:07

## 2020-08-04 RX ADMIN — PROPOFOL 50 MG: 10 INJECTION, EMULSION INTRAVENOUS at 08:09

## 2020-08-04 RX ADMIN — Medication 10 MG: at 09:09

## 2020-08-04 RX ADMIN — LIDOCAINE HYDROCHLORIDE 60 MG: 20 INJECTION, SOLUTION INTRAVENOUS at 08:04

## 2020-08-04 RX ADMIN — TRANEXAMIC ACID 1000 MG: 100 INJECTION, SOLUTION INTRAVENOUS at 08:25

## 2020-08-04 RX ADMIN — PROPOFOL 150 MG: 10 INJECTION, EMULSION INTRAVENOUS at 08:06

## 2020-08-04 RX ADMIN — Medication 10 MG: at 08:46

## 2020-08-04 RX ADMIN — FAMOTIDINE 20 MG: 10 INJECTION INTRAVENOUS at 07:09

## 2020-08-04 RX ADMIN — HYDROMORPHONE HYDROCHLORIDE 0.5 MG: 1 INJECTION, SOLUTION INTRAMUSCULAR; INTRAVENOUS; SUBCUTANEOUS at 11:17

## 2020-08-04 RX ADMIN — HYDROMORPHONE HYDROCHLORIDE 0.5 MG: 2 INJECTION, SOLUTION INTRAMUSCULAR; INTRAVENOUS; SUBCUTANEOUS at 09:38

## 2020-08-04 RX ADMIN — SODIUM CHLORIDE, POTASSIUM CHLORIDE, SODIUM LACTATE AND CALCIUM CHLORIDE 100 ML/HR: 600; 310; 30; 20 INJECTION, SOLUTION INTRAVENOUS at 22:45

## 2020-08-04 RX ADMIN — ONDANSETRON 4 MG: 2 INJECTION INTRAMUSCULAR; INTRAVENOUS at 08:14

## 2020-08-04 RX ADMIN — TRANEXAMIC ACID 1000 MG: 100 INJECTION, SOLUTION INTRAVENOUS at 09:35

## 2020-08-04 RX ADMIN — HYDROCODONE BITARTRATE AND ACETAMINOPHEN 1 TABLET: 7.5; 325 TABLET ORAL at 22:49

## 2020-08-04 RX ADMIN — CALCIUM CARBONATE 500 MG (1,250 MG)-VITAMIN D3 200 UNIT TABLET 1 TABLET: at 13:34

## 2020-08-04 RX ADMIN — HYDROMORPHONE HYDROCHLORIDE 0.5 MG: 2 INJECTION, SOLUTION INTRAMUSCULAR; INTRAVENOUS; SUBCUTANEOUS at 10:46

## 2020-08-04 RX ADMIN — SODIUM CHLORIDE, POTASSIUM CHLORIDE, SODIUM LACTATE AND CALCIUM CHLORIDE 1000 ML: 600; 310; 30; 20 INJECTION, SOLUTION INTRAVENOUS at 07:08

## 2020-08-04 RX ADMIN — FENTANYL CITRATE 50 MCG: 50 INJECTION, SOLUTION INTRAMUSCULAR; INTRAVENOUS at 08:07

## 2020-08-04 RX ADMIN — Medication 1000 UNITS: at 13:33

## 2020-08-04 RX ADMIN — HYDROCODONE BITARTRATE AND ACETAMINOPHEN 1 TABLET: 7.5; 325 TABLET ORAL at 13:37

## 2020-08-04 RX ADMIN — HYDROMORPHONE HYDROCHLORIDE 0.5 MG: 2 INJECTION, SOLUTION INTRAMUSCULAR; INTRAVENOUS; SUBCUTANEOUS at 09:45

## 2020-08-04 RX ADMIN — CEFAZOLIN SODIUM 1 G: 1 SOLUTION INTRAVENOUS at 15:53

## 2020-08-04 NOTE — CONSULTS
[facility]   CONSULTATION      Name:  Elli Edwards   Age:  69 y.o.  Sex:  female  :  1950  MRN:  [id number]   Visit Number:  [id number]  Admission Date:  2020  Date Of Service:  20  Primary Care Physician:  Elli Trinh MD    Consulting Physician:    Kat Isaac DO    Referring Physician:    Dr. Peoples    Reason For Consult:    Perioperative medical management    Chief Complaint:     Hip osteoarthritis, status post hip replacement    History Of Presenting Illness:      The patient is a 69-year-old female who underwent elective right total hip replacement this morning with Dr. Peoples.  We were asked to see patient in consultation for perioperative medical management.  She does have a medical history of osteoarthritis, hypertension, GERD, hyperlipidemia, vitamin D deficiency.  She denies any recent medication changes.  Her daughter is a nurse and is at bedside.  She states she was on a statin, but this caused myalgias and was discontinued.  She otherwise takes the lisinopril in addition to vitamin D and Os-Jessee.  She is on Zoloft as well.  She admits to some mild discomfort in her right hip currently, but otherwise denies any acute complaints.  Denies any shortness of breath.  She does state she has a deviated septum, does have low oxygen at times at night when she is sleeping when she has been in the hospital before.  Denies any diagnosis of sleep apnea.    Review Of Systems:     General ROS: Patient denies any fevers, chills or loss of consciousness.  Psychological ROS: Denies any hallucinations and delusions.  Ophthalmic ROS: Denies any diplopia or transient loss of vision.  ENT ROS: Denies sore throat, nasal congestion or ear pain.   Allergy and Immunology ROS: Denies rash or itching.  Hematological and Lymphatic ROS: Denies neck swelling or easy bleeding.  Endocrine ROS: Denies any recent unintentional weight gain or loss.  Respiratory ROS: Denies  cough or shortness of breath.   Cardiovascular ROS: Denies chest pain or palpitations. No history of exertional chest pain.   Gastrointestinal ROS: Denies nausea and vomiting. Denies any abdominal pain. No diarrhea.   Genito-Urinary ROS: Denies dysuria or hematuria.  Musculoskeletal ROS: Right hip pain  Neurological ROS: Denies any focal weakness. No loss of consciousness. Denies any numbness.   Dermatological ROS: Denies any redness or pruritis.     Past Medical History:    Past Medical History:   Diagnosis Date   • Arthritis    • Blisters of multiple sites     lower back   • Cataract     PT REPORTS BILATERAL EYES   • Colon polyp 12/03/2015   • Colon polyps 10/08/2019   • Depression    • Deviated septum     has to have breathe [employer] or tape on nose for surgery   • Diarrhea    • Diverticulosis    • Dry eyes    • Elevated cholesterol    • Frequency of urination    • GERD (gastroesophageal reflux disease)    • H/O cardiovascular stress test 10/07/2019    10+ years ago    • Hearing loss     Patient reported no use of hearing aids   • Hip pain     right hip   • Hypertension    • Hypothyroid    • Localized osteoarthritis of right knee    • Piercing     ears only   • Seasonal allergies     allergic to trees, grass/foxfire burns   • Tinnitus    • Wears glasses        Past Surgical history:    Past Surgical History:   Procedure Laterality Date   • ADENOIDECTOMY     • CATARACT EXTRACTION Bilateral    • CATARACT EXTRACTION W/ INTRAOCULAR LENS IMPLANT Right 2/2/2017    Procedure: RIGHT CATARACT PHACO EXTRACTION WITH INTRAOCULAR LENS IMPLANT;  Surgeon: Miky Garza MD;  Location: [facility];  Service:    • CATARACT EXTRACTION W/ INTRAOCULAR LENS IMPLANT Left 2/16/2017    Procedure: LEFT CATARACT PHACO EXTRACTION WITH INTRAOCULAR LENS IMPLANT;  Surgeon: Miky Garza MD;  Location: [facility] OR;  Service:    • CHOLECYSTECTOMY N/A 8/6/2019    Procedure: CHOLECYSTECTOMY LAPAROSCOPIC;  Surgeon: Oliver Aguilar MD;  Location:  [facility]  OR;  Service: General   • COLONOSCOPY  2016   • COLONOSCOPY N/A 10/8/2019    Procedure: COLONOSCOPY WITH POLYPECTOMY;  Surgeon: Lemuel Sagastume MD;  Location: [facility] ENDOSCOPY;  Service: Gastroenterology   • ENDOSCOPY     • LASER ABLATION      cervical   • MOUTH SURGERY      SEVERAL WISDOM TEETH EXTRACTED   • MOUTH SURGERY      ORAL IMPLANT   • TONSILLECTOMY      AT AGE 5       Social History:    Social History     Socioeconomic History   • Marital status:      Spouse name: Not on file   • Number of children: Not on file   • Years of education: Not on file   • Highest education level: Not on file   Occupational History   • Occupation: retired     Employer: [employer]     Comment: worked for [employer] office in [place] [facility]   Tobacco Use   • Smoking status: Never Smoker   • Smokeless tobacco: Never Used   Substance and Sexual Activity   • Alcohol use: No     Comment: RARELY DRINKS, 2-3 X MONTHLY   • Drug use: No   • Sexual activity: Defer   Social History Narrative    Right hand dominant       Family History:    Family History   Problem Relation Age of Onset   • Prostate cancer Father    • Colon cancer Paternal Grandmother    • Hyperlipidemia Other    • Thyroid cancer Mother    • Breast cancer Neg Hx    • Ovarian cancer Neg Hx        Allergies:      Codeine    Home Medications:    Prior to Admission Medications     Prescriptions Last Dose Informant Patient Reported? Taking?    atorvastatin (LIPITOR) 40 MG tablet 8/4/2020  Yes Yes    Take 40 mg by mouth Daily.    calcium carbonate (OS-ZEESHAN) 600 MG tablet Past Week Self Yes Yes    Take 600 mg by mouth Daily.    chlorthalidone (HYGROTON) 25 MG tablet 8/4/2020  Yes Yes    Take 25 mg by mouth Daily.    cholecalciferol (VITAMIN D3) 1000 units tablet Past Week Self Yes Yes    Take 1,000 Units by mouth Daily.    lisinopril (PRINIVIL,ZESTRIL) 20 MG tablet 8/4/2020 Self Yes Yes    Take 20 mg by mouth Daily.    sertraline (ZOLOFT) 100 MG tablet 8/4/2020 Self Yes Yes     Take 100 mg by mouth Daily.             Hospital Scheduled Meds:      [START ON 8/5/2020] atorvastatin 40 mg Oral Daily   bupivacaine (PF)      calcium 500 mg vitamin D 5 mcg (200 UT) 1 tablet Oral Daily   ceFAZolin 1 g Intravenous Q8H   [START ON 8/5/2020] chlorthalidone 25 mg Oral Daily   dexamethasone sodium phosphate      [START ON 8/5/2020] enoxaparin 40 mg Subcutaneous Daily   [START ON 8/5/2020] lisinopril 20 mg Oral Daily   [START ON 8/5/2020] sertraline 100 mg Oral Daily   sodium chloride 3 mL Intravenous Q12H   cholecalciferol 1,000 Units Oral Daily         lactated ringers 100 mL/hr Last Rate: 100 mL/hr (08/04/20 1232)        Vital Signs:    Temp:  [96.8 °F (36 °C)-98.7 °F (37.1 °C)] 96.8 °F (36 °C)  Heart Rate:  [] 76  Resp:  [12-18] 15  BP: (113-156)/(62-86) 125/65        08/04/20  1230   Weight: 98 kg (216 lb 0.8 oz)       Body mass index is 40.16 kg/m².    Physical Exam:    General Appearance:  Alert and cooperative, not in any acute distress.   Head:  Atraumatic and normocephalic, without obvious abnormality.   Eyes:          PERRLA, conjunctivae and sclerae normal, no Icterus. No pallor. Extraocular movements are within normal limits.   Ears:  Ears appear intact with no abnormalities noted.   Throat: No oral lesions, no thrush, oral mucosa moist.   Neck: Supple, trachea midline, no thyromegaly, no carotid bruit.   Back:   No kyphoscoliosis present. No tenderness to palpation,   range of motion normal.   Lungs:   Chest shape is normal. Breath sounds heard bilaterally equally.  No crackles or wheezing. No Pleural rub or bronchial breathing.   Heart:  Normal S1 and S2, no murmur, no gallop, no rub. No JVD.   Abdomen:   Normal bowel sounds, no masses, no organomegaly. Soft, non-tender, non-distended, no guarding, no rebound tenderness.  Obese   Extremities: Moves all extremities well, no edema, no cyanosis, no clubbing.  Right hip with stabilizer.  Sensation intact at the foot.  No obvious edema.    Pulses: Pulses palpable and equal bilaterally.   Skin: No bleeding, bruising or rash.   Neurologic: Alert and oriented x 3. Moves all four limbs equally. No tremors. No facial asymmetry.     Laboratory data:          Invalid input(s): LABALBU, PROT                                    Invalid input(s): USDES,  BLOODU, NITRITITE, BACT, EP  Pain Management Panel     There is no flowsheet data to display.              EKG:      Appears to be a sinus rhythm, heart rate in the 70s, no ST or T wave abnormalities.  This was obtained on 7/22/2020.    Radiology:    Imaging Results (Last 72 Hours)     Procedure Component Value Units Date/Time    XR Pelvis 1 or 2 View [[id number]] Collected:  08/04/20 1136     Updated:  08/04/20 1138    Narrative:       PROCEDURE: XR PELVIS 1 OR 2 VW-     HISTORY: Post-op R THR; M25.551-Pain in right hip; M16.11-Unilateral  primary osteoarthritis, right hip     COMPARISON: None.     FINDINGS: The patient is status post total right hip arthroplasty. There  are no hardware complications or fractures. The pubic symphysis and SI  joints are intact. Soft tissue gas is seen in the right hip.       Impression:       Status post total right hip arthroplasty with no hardware  complications.     This report was finalized on 8/4/2020 11:36 AM by Sonya Arias M.D..          Assessment:     1.  Primary right hip osteoarthritis, status post total hip replacement  2.  Essential hypertension  3.  Dyslipidemia  4.  GERD  5.  History of colon polyps  6.  Obesity  7.  Deviated septum    Recommendations/Plan:     Patient admitted to the orthopedic service, we were asked to see in consultation.  We will continue with her home medications as previously ordered.  Agree with DVT prophylaxis.  PT and OT evaluations.  Use of incentive spirometer.  PRN oxygen when sleeping likely needed.  Would probably benefit from a sleep study on outpatient basis.  PRN pain control antiemetics.  Monitor hemoglobin for any  evidence of postoperative bleeding.    Otherwise, we will continue to follow along.  Please contact with any questions.    Kat Isaac DO  08/04/20  16:48

## 2020-08-04 NOTE — OP NOTE
[facility] [address], [address]  [place]  [address] [phone]      OPERATIVE REPORT         PATIENT NAME:  Elli Edwards                            YOB: 1950        PREOP DIAGNOSIS:  Right hip advanced end-stage osteoarthritis    POSTOP DIAGNOSIS:  Right hip advanced end-stage osteoarthritis    PROCEDURE:  Right total hip replacement.    SURGEON:   Juan Peoples MD    OPERATIVE TEAM:  Circulator: Valentine Cartwright RN; Avelina Padilla RN  Scrub Person: Favian James; Chico Garcia    ANESTHETIST:  CONCHA: Tanner Lopez CRNA    ANESTHESIA:   General    FLUIDS:   2100 ml crystalloid    ESTIMATED BLOOD LOSS: 300 ml    SPECIMENS:   Femoral head sent to Pathology.    DRAINS:   None.    FINDINGS: Severe degenerative arthritis, large osteophytes of most of rim of acetabulum and base of femur head, erosions, cysts, deformity of femoral head, deformity and migration of acetabulum, calcified degenerative labrum and extensive bone-on-bone wear.    HARDWARE:                       Biomet Taperloc Complete press fit total hip  Replacement with a #10 stem, 133 degree neck angle, +0 mm neck, 36 mm  ceramic head, 50 mm acetabular cup with two superior quadrant screws (30 mm,  20 mm) and 10 degree posterior high wall cross-linked polyethylene liner.      COMPLICATIONS:  None.    DISPOSITION:  Stable to recovery.    INDICATIONS/NARRATIVE:  The patient presents for planned elective total hip replacement.  The patient has persistent daily pain, limited ambulation and activity intolerance, hip stiffness, deformity, and limitations related to advanced degenerative joint disease of the hip.  Treatment alternatives have been discussed, and the patient wishes to proceed with elective total hip replacement.  Risks and benefits of the proposed procedure have been discussed, and informed consent obtained.  Risks were discussed including but not limited to, anesthesia, infection,  nerve/vessel/tendon injury, fracture, prosthetic wear, loosening or dislocation, DVT, pulmonary embolus, blood loss and the possible need for transfusion.  Arrangements have been made for tranexamic acid IV protocol.  Goals of the procedure include the potential for pain relief, improved hip motion, limb alignment and improved tolerance with ambulation and activities.      Antibiotic prophylaxis was given.  Surgeon site marking and a time out were performed.  Anesthesia was effective and well tolerated.  The hip and leg were prepped and draped in the usual sterile fashion.  The patient was placed in the lateral decubitus position for the procedure, with care taken to pad all areas of the body including a bean bag mold, axillary roll, and fibular head and malleolar padding.      After sterile prep and drape, a curved incision was made centered on the greater trochanter of the hip.  Dissection proceeded in line with the skin incision and through the tensor fascia timothy.  A Charnley self-retaining retractor was placed.  The hip was gently internally rotated and a blunt Cobra was placed under the gluteus medius.  The piriformis tendon and short external rotators were released from the fossa and tagged for subsequent repair.  A T capsulotomy was performed and the capsule was tagged for repair.  Synovial joint fluid expressed and suctioned.  There were marked degenerative changes, cartilage worn down to bone and a deformed femoral head and acetabulum.  Using the neck-cutting template, with the desired slope and position, a neck cut was made with a saw.  The femoral head was extracted from the hip, and sent to pathology as a specimen.      After debridement of the osteophytes, reaming of the acetabulum was performed that provided a concentric acetabular socket for the implant.  A trial cup provided an excellent press fit with no toggling.  Care was taken to ream and place the cup in 15-20 degrees of anteversion and 45-50  degrees of inclination.  The trial component was removed, and the acetabulum was irrigated copiously.  Then, the actual acetabular shell was impacted in place and obtained an excellent press fit, with good position.  The fixation was reinforced with superior quadrant screws, with standard drilling depth gauge measurement, and placement of screws.  Then, a trial liner was placed onto the cup.    Next, steps were taken to prepare the femur.  A box-cutting osteotome was used to lateralize the entry to the canal and along the greater trochanter.  Sequential broaching with the broach was performed, up to the best-fit sizes, which provided an excellent press fit, no toggling.  Care was taken to broach 15-20 degrees of femoral anteversion.  Next, trial heads and necks were placed to find the best range of motion and stability.  There was smooth, free flexion, full extension of the hip and smooth full external and internal rotation with stability.  The trial components were removed.  The wound and bone surfaces were pulse irrigated with copious antibiotic solution, and then suctioned.  Next, the actual stem was placed, which had an excellent press fit that was in good position.  The trial cup liner was removed and the actual insert was placed.  The actual head was then Argueta tapered onto the femoral stem.  A final reduction was performed.  Clinically the leg lengths were equal and there was full range of motion and stability of the hip.  The wound was irrigated copiously.      Routine closure was performed and consisted of interrupted #1 Vicryl to repair the capsule, #5 non-absorbable Ethibond to repair the piriformis tendon, #1 Vicryl to repair the tensor fascia timothy, 2-0 Vicryl for the deep and superficial subcutaneous layers, and staples for the skin.  A sterile Aquacel dressing was applied.  An abduction pillow was placed and the patient was moved supine on the hospital bed.  Anesthesia was effective and well tolerated.   There were no complications of surgery.  The patient was transferred in stable condition to the recovery room and x-rays of the pelvis and hip were requested.

## 2020-08-04 NOTE — ANESTHESIA POSTPROCEDURE EVALUATION
Patient: Elli Edwards    Procedure Summary     Date:  08/04/20 Room / Location:  [facility] OR  /  [facility]    Anesthesia Start:  0803 Anesthesia Stop:      Procedure:  Total hip arthroplasty RIGHT (BIOMET) (Right Hip) Diagnosis:       Arthralgia of right hip      Primary osteoarthritis of right hip      (Arthralgia of right hip [M25.551])      (Primary osteoarthritis of right hip [M16.11])    Surgeon:  Justice Peoples MD Provider:  Tanner Lopez CRNA    Anesthesia Type:  general ASA Status:  2          Anesthesia Type: general    Vitals  Vitals Value Taken Time   /75 8/4/2020 10:56 AM   Temp     Pulse 94 8/4/2020 10:56 AM   Resp     SpO2 97 % 8/4/2020 10:56 AM   Vitals shown include unvalidated device data.        Post Anesthesia Care and Evaluation    Patient location during evaluation: PACU  Patient participation: complete - patient participated  Level of consciousness: awake and alert and sleepy but conscious  Pain score: 2  Pain management: adequate  Airway patency: patent  Anesthetic complications: No anesthetic complications  PONV Status: none  Cardiovascular status: acceptable  Respiratory status: acceptable and face mask  Hydration status: acceptable

## 2020-08-04 NOTE — INTERVAL H&P NOTE
H&P reviewed. The patient was examined and there are no changes to the H&P, inclusive of the physical exam heart, lungs and procedure site specific exam as noted on the current (within 30 days) history and physical full detailed report.    Vitals:    08/04/20 0644   BP: 156/86   Pulse: 90   Resp: 16   Temp: 98.7 °F (37.1 °C)   SpO2: 95%     Justice Peoples MD  8/4/2020  7:52 AM

## 2020-08-04 NOTE — THERAPY EVALUATION
Patient Name: Elli Edwards  : 1950    MRN: [id number]                              Today's Date: 2020       Admit Date: 2020    Visit Dx:     ICD-10-CM ICD-9-CM   1. Arthralgia of right hip M25.551 719.45   2. Primary osteoarthritis of right hip M16.11 715.15     Patient Active Problem List   Diagnosis   • Benign essential hypertension   • Polyp of colon   • Heartburn   • Hyperlipidemia   • Phlebitis   • Vertigo   • Vitamin D deficiency   • Internal hemorrhoids   • Diverticulosis of colon   • Diarrhea   • Arthralgia of right hip   • Primary osteoarthritis of right hip   • Status post total hip replacement, right     Past Medical History:   Diagnosis Date   • Arthritis    • Blisters of multiple sites     lower back   • Cataract     PT REPORTS BILATERAL EYES   • Colon polyp 2015   • Colon polyps 10/08/2019   • Depression    • Deviated septum     has to have breathe [employer] or tape on nose for surgery   • Diarrhea    • Diverticulosis    • Dry eyes    • Elevated cholesterol    • Frequency of urination    • GERD (gastroesophageal reflux disease)    • H/O cardiovascular stress test 10/07/2019    10+ years ago    • Hearing loss     Patient reported no use of hearing aids   • Hip pain     right hip   • Hypertension    • Hypothyroid    • Impaired functional mobility, balance, gait, and endurance    • Localized osteoarthritis of right knee    • Piercing     ears only   • Seasonal allergies     allergic to trees, grass/foxfire burns   • Tinnitus    • Wears glasses      Past Surgical History:   Procedure Laterality Date   • ADENOIDECTOMY     • CATARACT EXTRACTION Bilateral    • CATARACT EXTRACTION W/ INTRAOCULAR LENS IMPLANT Right 2017    Procedure: RIGHT CATARACT PHACO EXTRACTION WITH INTRAOCULAR LENS IMPLANT;  Surgeon: Miky Garza MD;  Location: [facility];  Service:    • CATARACT EXTRACTION W/ INTRAOCULAR LENS IMPLANT Left 2017    Procedure: LEFT CATARACT PHACO EXTRACTION WITH INTRAOCULAR  LENS IMPLANT;  Surgeon: Miky Garza MD;  Location: [facility] OR;  Service:    • CHOLECYSTECTOMY N/A 8/6/2019    Procedure: CHOLECYSTECTOMY LAPAROSCOPIC;  Surgeon: Oliver Aguilar MD;  Location: Replaced by [facility] OR;  Service: General   • COLONOSCOPY  2016   • COLONOSCOPY N/A 10/8/2019    Procedure: COLONOSCOPY WITH POLYPECTOMY;  Surgeon: Lemuel Sagastume MD;  Location: [facility] ENDOSCOPY;  Service: Gastroenterology   • ENDOSCOPY     • LASER ABLATION      cervical   • MOUTH SURGERY      SEVERAL WISDOM TEETH EXTRACTED   • MOUTH SURGERY      ORAL IMPLANT   • TONSILLECTOMY      AT AGE 5     General Information     Row Name 08/04/20 1631          PT Evaluation Time/Intention    Document Type  evaluation  -JR     Mode of Treatment  physical therapy  -JR     Row Name 08/04/20 1631          General Information    Patient Profile Reviewed?  yes  -JR     Prior Level of Function  independent:;all household mobility  -JR     Existing Precautions/Restrictions  fall  -JR     Barriers to Rehab  none identified  -JR     Row Name 08/04/20 1631          Relationship/Environment    Lives With  spouse  -JR     Row Name 08/04/20 1631          Resource/Environmental Concerns    Current Living Arrangements  home/apartment/condo  -JR     Row Name 08/04/20 1631          Cognitive Assessment/Intervention- PT/OT    Orientation Status (Cognition)  oriented x 4  -JR     Row Name 08/04/20 1631          Safety Issues, Functional Mobility    Safety Issues Affecting Function (Mobility)  safety precautions follow-through/compliance;awareness of need for assistance;positioning of assistive device  -JR     Impairments Affecting Function (Mobility)  pain;strength;endurance/activity tolerance;shortness of breath;balance  -JR       User Key  (r) = Recorded By, (t) = Taken By, (c) = Cosigned By    Initials Name Provider Type    JR Little Hagen, PT Physical Therapist        Mobility     Row Name 08/04/20 1631          Bed Mobility Assessment/Treatment    Bed Mobility  Assessment/Treatment  supine-sit  -JR     Supine-Sit [place] (Bed Mobility)  minimum assist (75% patient effort)  -JR     Assistive Device (Bed Mobility)  head of bed elevated  -JR     Row Name 08/04/20 1631          Sit-Stand Transfer    Sit-Stand [place] (Transfers)  minimum assist (75% patient effort)  -JR     Assistive Device (Sit-Stand Transfers)  walker, front-wheeled  -JR     Row Name 08/04/20 1631          Gait/Stairs Assessment/Training    Gait/Stairs Assessment/Training  gait/ambulation assistive device  -JR     [place] Level (Gait)  minimum assist (75% patient effort)  -JR     Assistive Device (Gait)  walker, front-wheeled  -JR     Distance in Feet (Gait)  3  -JR     Pattern (Gait)  step-to  -JR     Deviations/Abnormal Patterns (Gait)  antalgic;base of support, wide;festinating/shuffling;stride length decreased  -JR     Bilateral Gait Deviations  forward flexed posture;heel strike decreased  -JR     Right Sided Gait Deviations  weight shift ability decreased;knee buckling, right side  -     Row Name 08/04/20 1631          Mobility Assessment/Intervention    Extremity Weight-bearing Status  right lower extremity  -JR     Right Lower Extremity (Weight-bearing Status)  weight-bearing as tolerated (WBAT)  -       User Key  (r) = Recorded By, (t) = Taken By, (c) = Cosigned By    Initials Name Provider Type    Little Lewis PT Physical Therapist        Obj/Interventions     Row Name 08/04/20 1631          General ROM    GENERAL ROM COMMENTS  RLE limit due to OLI precautions  -     Row Name 08/04/20 1631          MMT (Manual Muscle Testing)    General MMT Comments  RLE=2+/5  -     Row Name 08/04/20 1631          Static Sitting Balance    Level of [place] (Unsupported Sitting, Static Balance)  supervision  -JR     Sitting Position (Unsupported Sitting, Static Balance)  sitting on edge of bed  -JR     Time Able to Maintain Position (Unsupported Sitting, Static Balance)  1 to 2  minutes  -JR     Row Name 08/04/20 1631          Dynamic Sitting Balance    Level of [place], Reaches Outside Midline (Sitting, Dynamic Balance)  contact guard assist  -JR     Sitting Position, Reaches Outside Midline (Sitting, Dynamic Balance)  sitting on edge of bed  -JR     Row Name 08/04/20 1631          Static Standing Balance    Level of [place] (Supported Standing, Static Balance)  contact guard assist  -JR     Time Able to Maintain Position (Supported Standing, Static Balance)  1 to 2 minutes  -JR     Assistive Device Utilized (Supported Standing, Static Balance)  walker, rolling  -JR     Row Name 08/04/20 1631          Dynamic Standing Balance    Level of [place], Reaches Outside Midline (Standing, Dynamic Balance)  minimal assist, 75% patient effort  -JR     Time Able to Maintain Position, Reaches Outside Midline (Standing, Dynamic Balance)  1 to 2 minutes  -JR     Assistive Device Utilized (Supported Standing, Dynamic Balance)  walker, rolling  -JR       User Key  (r) = Recorded By, (t) = Taken By, (c) = Cosigned By    Initials Name Provider Type    Little Lewis, PT Physical Therapist        Goals/Plan     Row Name 08/04/20 1631          Bed Mobility Goal 1 (PT)    Activity/Assistive Device (Bed Mobility Goal 1, PT)  bed mobility activities, all  -JR     [place] Level/Cues Needed (Bed Mobility Goal 1, PT)  supervision required  -JR     Time Frame (Bed Mobility Goal 1, PT)  1 week  -JR     Progress/Outcomes (Bed Mobility Goal 1, PT)  goal ongoing  -[facility] Name 08/04/20 1631          Transfer Goal 1 (PT)    Activity/Assistive Device (Transfer Goal 1, PT)  sit-to-stand/stand-to-sit;bed-to-chair/chair-to-bed  -JR     [place] Level/Cues Needed (Transfer Goal 1, PT)  supervision required  -JR     Time Frame (Transfer Goal 1, PT)  1 week  -JR     Progress/Outcome (Transfer Goal 1, PT)  goal ongoing  -[facility] Name 08/04/20 1631          Gait Training Goal 1 (PT)     Activity/Assistive Device (Gait Training Goal 1, PT)  gait (walking locomotion);assistive device use;walker, rolling  -JR     [place] Level (Gait Training Goal 1, PT)  standby assist  -JR     Distance (Gait Goal 1, PT)  250  -JR     Time Frame (Gait Training Goal 1, PT)  2 weeks  -JR     Progress/Outcome (Gait Training Goal 1, PT)  goal ongoing  -JR     Row Name 08/04/20 1631          Patient Education Goal (PT)    Activity (Patient Education Goal, PT)  Perform HEP x 15 reps  -JR     [place]/Cues/Accuracy (Memory Goal 2, PT)  demonstrates adequately  -JR     Time Frame (Patient Education Goal, PT)  1 week  -JR     Progress/Outcome (Patient Education Goal, PT)  goal ongoing  -JR       User Key  (r) = Recorded By, (t) = Taken By, (c) = Cosigned By    Initials Name Provider Type    Little Lewis, PT Physical Therapist        Clinical Impression     Row Name 08/04/20 1631          Pain Assessment    Additional Documentation  Pain Scale: Numbers Pre/Post-Treatment (Group)  -     Row Name 08/04/20 1631          Pain Scale: Numbers Pre/Post-Treatment    Pain Scale: Numbers, Pretreatment  3/10  -JR     Pain Scale: Numbers, Post-Treatment  1/10  -JR     Pain Location - Side  Right  -JR     Pain Location  hip  -JR     Pain Intervention(s)  Ambulation/increased activity;Repositioned  -JR     Row Name 08/04/20 1631          Plan of Care Review    Plan of Care Reviewed With  patient;daughter  -     Row Name 08/04/20 1631          Physical Therapy Clinical Impression    Patient/Family Goals Statement (PT Clinical Impression)  Patient is eager to get home to her   -     Criteria for Skilled Interventions Met (PT Clinical Impression)  yes;treatment indicated  -JR     Rehab Potential (PT Clinical Summary)  good, to achieve stated therapy goals  -     Row Name 08/04/20 1631          Vital Signs    Pre Patient Position  Supine  -JR     Intra Patient Position  Standing  -JR     Post Patient Position  Sitting   -JR     Row Name 08/04/20 1631          Positioning and Restraints    Pre-Treatment Position  in bed  -JR     Post Treatment Position  chair  -JR     In Chair  reclined;call light within reach;encouraged to call for assist;with family/caregiver  -JR       User Key  (r) = Recorded By, (t) = Taken By, (c) = Cosigned By    Initials Name Provider Type    Little Lewis PT Physical Therapist        Outcome Measures     Row Name 08/04/20 1631          How much help from another person do you currently need...    Turning from your back to your side while in flat bed without using bedrails?  3  -JR     Moving from lying on back to sitting on the side of a flat bed without bedrails?  3  -JR     Moving to and from a bed to a chair (including a wheelchair)?  3  -JR     Standing up from a chair using your arms (e.g., wheelchair, bedside chair)?  3  -JR     Climbing 3-5 steps with a railing?  2  -JR     To walk in hospital room?  3  -JR     AM-PAC 6 Clicks Score (PT)  17  -JR     Row Name 08/04/20 1631          Functional Assessment    Outcome Measure Options  AM-PAC 6 Clicks Basic Mobility (PT)  -       User Key  (r) = Recorded By, (t) = Taken By, (c) = Cosigned By    Initials Name Provider Type    Little Lewis PT Physical Therapist        Physical Therapy Education                 Title: PT OT SLP Therapies (In Progress)     Topic: Physical Therapy (In Progress)     Point: Mobility training (Done)     Description:   Instruct learner(s) on safety and technique for assisting patient out of bed, chair or wheelchair.  Instruct in the proper use of assistive devices, such as walker, crutches, cane or brace.              Patient Friendly Description:   It's important to get you on your feet again, but we need to do so in a way that is safe for you. Falling has serious consequences, and your personal safety is the most important thing of all.        When it's time to get out of bed, one of us or a family member will sit next  to you on the bed to give you support.     If your doctor or nurse tells you to use a walker, crutches, a cane, or a brace, be sure you use it every time you get out of bed, even if you think you don't need it.    Learning Progress Summary           Patient Acceptance, E,TB, VU by  at 8/4/2020 1932    Comment:  Role of PT and POC                   Point: Home exercise program (Not Started)     Description:   Instruct learner(s) on appropriate technique for monitoring, assisting and/or progressing patient with therapeutic exercises and activities.              Learner Progress:   Not documented in this visit.          Point: Body mechanics (Not Started)     Description:   Instruct learner(s) on proper positioning and spine alignment for patient and/or caregiver during mobility tasks and/or exercises.              Learner Progress:   Not documented in this visit.          Point: Precautions (Not Started)     Description:   Instruct learner(s) on prescribed precautions during mobility and gait tasks              Learner Progress:   Not documented in this visit.                      User Key     Initials Effective Dates Name Provider Type [facility] 04/03/18 -  Little Hagen, SHAE Physical Therapist PT              PT Recommendation and Plan  Planned Therapy Interventions (PT Eval): strengthening, balance training, bed mobility training, transfer training, gait training, home exercise program, patient/family education  Outcome Summary/Treatment Plan (PT)  Anticipated Discharge Disposition (PT): home with home health  Plan of Care Reviewed With: patient, daughter     Time Calculation:   PT Charges     Row Name 08/04/20 1934             Time Calculation    Start Time  1631  -      PT Received On  08/04/20  -      PT Goal Re-Cert Due Date  08/14/20  -        User Key  (r) = Recorded By, (t) = Taken By, (c) = Cosigned By    Initials Name Provider Type     Little Hagen, PT Physical Therapist        Therapy  Charges for Today     Code Description Service Date Service Provider Modifiers Qty    01041634013 HC PT EVAL LOW COMPLEXITY 4 8/4/2020 Little Hagen, PT GP 1          PT G-Codes  Outcome Measure Options: AM-PAC 6 Clicks Basic Mobility (PT)  AM-PAC 6 Clicks Score (PT): 17    Little Hagen, PT  8/4/2020

## 2020-08-04 NOTE — PLAN OF CARE
Patient participates well in PT evaluation and demonstrates decreased strength, transfers, balance and gait. She requires no more than minimal assistance for mobility, but becomes nauseated. She is expected to benefit from additional PT services while hospitalized and upon discharge to home with home health care services.   Problem: Patient Care Overview  Goal: Plan of Care Review  Outcome: Ongoing (interventions implemented as appropriate)  Flowsheets (Taken 8/4/2020 8769)  Outcome Summary: --

## 2020-08-04 NOTE — ANESTHESIA PREPROCEDURE EVALUATION
Anesthesia Evaluation     Patient summary reviewed and Nursing notes reviewed   no history of anesthetic complications:  NPO Solid Status: > 8 hours  NPO Liquid Status: > 8 hours           Airway   Mallampati: I  TM distance: >3 FB  Neck ROM: full  no difficulty expected  Dental - normal exam     Pulmonary - negative pulmonary ROS and normal exam   Cardiovascular - normal exam    ECG reviewed    (+) hypertension, hyperlipidemia,       Neuro/Psych  (+) dizziness/light headedness, psychiatric history Anxiety and Depression,     GI/Hepatic/Renal/Endo    (+)  GERD,      Musculoskeletal     Abdominal    Substance History - negative use     OB/GYN negative ob/gyn ROS         Other   arthritis,                        Anesthesia Plan    ASA 2     general     intravenous induction     Anesthetic plan, all risks, benefits, and alternatives have been provided, discussed and informed consent has been obtained with: patient.    Plan discussed with CRNA.

## 2020-08-04 NOTE — ANESTHESIA PROCEDURE NOTES
Airway  Urgency: elective    Date/Time: 8/4/2020 8:26 AM  Airway not difficult    General Information and Staff    Patient location during procedure: OR  CRNA: Tanner Lopez CRNA    Indications and Patient Condition  Indications for airway management: airway protection    Preoxygenated: yes  Mask difficulty assessment: 1 - vent by mask    Final Airway Details  Final airway type: endotracheal airway      Successful airway: ETT  Cuffed: yes   Successful intubation technique: video laryngoscopy  Facilitating devices/methods: intubating stylet  Endotracheal tube insertion site: oral  Blade: Glidescope  Blade size: 3  ETT size (mm): 7.0  Cormack-Lehane Classification: grade I - full view of glottis  Placement verified by: chest auscultation and capnometry   Inital cuff pressure (cm H2O): 21  Measured from: teeth  Number of attempts at approach: 2  Assessment: lips, teeth, and gum same as pre-op    Additional Comments  Anterior airway

## 2020-08-04 NOTE — PLAN OF CARE
Pt. Admitted to the hosp. R/T right hip surgery. Vital signs have remained stable so far this shift. C/O pain however relieved with medication. No acute distress noted.     Problem: Patient Care Overview  Goal: Plan of Care Review  Outcome: Ongoing (interventions implemented as appropriate)     Problem: Pain, Chronic (Adult)  Goal: Identify Related Risk Factors and Signs and Symptoms  Outcome: Ongoing (interventions implemented as appropriate)     Problem: Pain, Chronic (Adult)  Goal: Acceptable Pain/Comfort Level and Functional Ability  Outcome: Ongoing (interventions implemented as appropriate)

## 2020-08-05 DIAGNOSIS — Z96.641 S/P HIP REPLACEMENT, RIGHT: Primary | ICD-10-CM

## 2020-08-05 LAB
ANION GAP SERPL CALCULATED.3IONS-SCNC: 10.6 MMOL/L (ref 5–15)
BASOPHILS # BLD AUTO: 0.02 10*3/MM3 (ref 0–0.2)
BASOPHILS NFR BLD AUTO: 0.1 % (ref 0–1.5)
BUN SERPL-MCNC: 13 MG/DL (ref 8–23)
BUN/CREAT SERPL: 20 (ref 7–25)
CALCIUM SPEC-SCNC: 8.9 MG/DL (ref 8.6–10.5)
CHLORIDE SERPL-SCNC: 96 MMOL/L (ref 98–107)
CO2 SERPL-SCNC: 31.4 MMOL/L (ref 22–29)
CREAT SERPL-MCNC: 0.65 MG/DL (ref 0.57–1)
DEPRECATED RDW RBC AUTO: 43.1 FL (ref 37–54)
EOSINOPHIL # BLD AUTO: 0 10*3/MM3 (ref 0–0.4)
EOSINOPHIL NFR BLD AUTO: 0 % (ref 0.3–6.2)
ERYTHROCYTE [DISTWIDTH] IN BLOOD BY AUTOMATED COUNT: 12.8 % (ref 12.3–15.4)
GFR SERPL CREATININE-BSD FRML MDRD: 90 ML/MIN/1.73
GLUCOSE SERPL-MCNC: 140 MG/DL (ref 65–99)
HCT VFR BLD AUTO: 36.5 % (ref 34–46.6)
HGB BLD-MCNC: 12 G/DL (ref 12–15.9)
IMM GRANULOCYTES # BLD AUTO: 0.1 10*3/MM3 (ref 0–0.05)
IMM GRANULOCYTES NFR BLD AUTO: 0.7 % (ref 0–0.5)
LYMPHOCYTES # BLD AUTO: 1.5 10*3/MM3 (ref 0.7–3.1)
LYMPHOCYTES NFR BLD AUTO: 10.2 % (ref 19.6–45.3)
MCH RBC QN AUTO: 30.1 PG (ref 26.6–33)
MCHC RBC AUTO-ENTMCNC: 32.9 G/DL (ref 31.5–35.7)
MCV RBC AUTO: 91.5 FL (ref 79–97)
MONOCYTES # BLD AUTO: 1.85 10*3/MM3 (ref 0.1–0.9)
MONOCYTES NFR BLD AUTO: 12.5 % (ref 5–12)
NEUTROPHILS NFR BLD AUTO: 11.3 10*3/MM3 (ref 1.7–7)
NEUTROPHILS NFR BLD AUTO: 76.5 % (ref 42.7–76)
NRBC BLD AUTO-RTO: 0 /100 WBC (ref 0–0.2)
PLATELET # BLD AUTO: 228 10*3/MM3 (ref 140–450)
PMV BLD AUTO: 11.5 FL (ref 6–12)
POTASSIUM SERPL-SCNC: 3.5 MMOL/L (ref 3.5–5.2)
RBC # BLD AUTO: 3.99 10*6/MM3 (ref 3.77–5.28)
SODIUM SERPL-SCNC: 138 MMOL/L (ref 136–145)
WBC # BLD AUTO: 14.77 10*3/MM3 (ref 3.4–10.8)

## 2020-08-05 PROCEDURE — 63710000001 CHLORTHALIDONE 25 MG TABLET: Performed by: ORTHOPAEDIC SURGERY

## 2020-08-05 PROCEDURE — 25010000003 CEFAZOLIN SODIUM-DEXTROSE 1-4 GM-%(50ML) RECONSTITUTED SOLUTION: Performed by: ORTHOPAEDIC SURGERY

## 2020-08-05 PROCEDURE — 63710000001 ACETAMINOPHEN 500 MG TABLET: Performed by: PHYSICIAN ASSISTANT

## 2020-08-05 PROCEDURE — 63710000001 SERTRALINE 50 MG TABLET: Performed by: ORTHOPAEDIC SURGERY

## 2020-08-05 PROCEDURE — A9270 NON-COVERED ITEM OR SERVICE: HCPCS | Performed by: ORTHOPAEDIC SURGERY

## 2020-08-05 PROCEDURE — G0378 HOSPITAL OBSERVATION PER HR: HCPCS

## 2020-08-05 PROCEDURE — 97116 GAIT TRAINING THERAPY: CPT

## 2020-08-05 PROCEDURE — 85025 COMPLETE CBC W/AUTO DIFF WBC: CPT | Performed by: ORTHOPAEDIC SURGERY

## 2020-08-05 PROCEDURE — 97110 THERAPEUTIC EXERCISES: CPT

## 2020-08-05 PROCEDURE — 80048 BASIC METABOLIC PNL TOTAL CA: CPT | Performed by: ORTHOPAEDIC SURGERY

## 2020-08-05 PROCEDURE — 25010000002 ONDANSETRON PER 1 MG: Performed by: ORTHOPAEDIC SURGERY

## 2020-08-05 PROCEDURE — 63710000001 LISINOPRIL 20 MG TABLET: Performed by: ORTHOPAEDIC SURGERY

## 2020-08-05 PROCEDURE — 97165 OT EVAL LOW COMPLEX 30 MIN: CPT

## 2020-08-05 PROCEDURE — 63710000001 ATORVASTATIN 40 MG TABLET: Performed by: ORTHOPAEDIC SURGERY

## 2020-08-05 PROCEDURE — 63710000001 CALCIUM 500 MG VITAMIN D 5 MCG (200 UT) 500-5 MG-MCG TABLET: Performed by: ORTHOPAEDIC SURGERY

## 2020-08-05 PROCEDURE — 99024 POSTOP FOLLOW-UP VISIT: CPT | Performed by: PHYSICIAN ASSISTANT

## 2020-08-05 PROCEDURE — 63710000001 CHOLECALCIFEROL 25 MCG (1000 UT) TABLET: Performed by: ORTHOPAEDIC SURGERY

## 2020-08-05 PROCEDURE — 25010000002 ENOXAPARIN PER 10 MG: Performed by: ORTHOPAEDIC SURGERY

## 2020-08-05 PROCEDURE — A9270 NON-COVERED ITEM OR SERVICE: HCPCS | Performed by: PHYSICIAN ASSISTANT

## 2020-08-05 PROCEDURE — 63710000001 HYDROCODONE-ACETAMINOPHEN 7.5-325 MG TABLET: Performed by: ORTHOPAEDIC SURGERY

## 2020-08-05 PROCEDURE — 99224 PR SBSQ OBSERVATION CARE/DAY 15 MINUTES: CPT | Performed by: FAMILY MEDICINE

## 2020-08-05 RX ORDER — HYDROCODONE BITARTRATE AND ACETAMINOPHEN 7.5; 325 MG/1; MG/1
1 TABLET ORAL EVERY 6 HOURS PRN
Qty: 28 TABLET | Refills: 0 | Status: SHIPPED | OUTPATIENT
Start: 2020-08-05 | End: 2020-09-28

## 2020-08-05 RX ORDER — ACETAMINOPHEN 500 MG
500 TABLET ORAL EVERY 6 HOURS PRN
Status: DISCONTINUED | OUTPATIENT
Start: 2020-08-05 | End: 2020-08-07 | Stop reason: HOSPADM

## 2020-08-05 RX ORDER — SODIUM CHLORIDE, SODIUM LACTATE, POTASSIUM CHLORIDE, CALCIUM CHLORIDE 600; 310; 30; 20 MG/100ML; MG/100ML; MG/100ML; MG/100ML
100 INJECTION, SOLUTION INTRAVENOUS CONTINUOUS
Status: DISCONTINUED | OUTPATIENT
Start: 2020-08-05 | End: 2020-08-06

## 2020-08-05 RX ADMIN — SODIUM CHLORIDE, POTASSIUM CHLORIDE, SODIUM LACTATE AND CALCIUM CHLORIDE 100 ML/HR: 600; 310; 30; 20 INJECTION, SOLUTION INTRAVENOUS at 21:24

## 2020-08-05 RX ADMIN — Medication 1000 UNITS: at 08:56

## 2020-08-05 RX ADMIN — ATORVASTATIN CALCIUM 40 MG: 40 TABLET, FILM COATED ORAL at 08:56

## 2020-08-05 RX ADMIN — ACETAMINOPHEN 500 MG: 500 TABLET, FILM COATED ORAL at 12:44

## 2020-08-05 RX ADMIN — SODIUM CHLORIDE, PRESERVATIVE FREE 3 ML: 5 INJECTION INTRAVENOUS at 08:56

## 2020-08-05 RX ADMIN — HYDROCODONE BITARTRATE AND ACETAMINOPHEN 1 TABLET: 7.5; 325 TABLET ORAL at 13:16

## 2020-08-05 RX ADMIN — CHLORTHALIDONE 25 MG: 25 TABLET ORAL at 08:56

## 2020-08-05 RX ADMIN — CEFAZOLIN SODIUM 1 G: 1 SOLUTION INTRAVENOUS at 00:21

## 2020-08-05 RX ADMIN — LISINOPRIL 20 MG: 20 TABLET ORAL at 08:56

## 2020-08-05 RX ADMIN — ONDANSETRON 4 MG: 2 INJECTION INTRAMUSCULAR; INTRAVENOUS at 06:22

## 2020-08-05 RX ADMIN — ONDANSETRON 4 MG: 2 INJECTION INTRAMUSCULAR; INTRAVENOUS at 00:29

## 2020-08-05 RX ADMIN — CALCIUM CARBONATE 500 MG (1,250 MG)-VITAMIN D3 200 UNIT TABLET 1 TABLET: at 08:56

## 2020-08-05 RX ADMIN — ACETAMINOPHEN 500 MG: 500 TABLET, FILM COATED ORAL at 20:18

## 2020-08-05 RX ADMIN — ONDANSETRON 4 MG: 2 INJECTION INTRAMUSCULAR; INTRAVENOUS at 12:31

## 2020-08-05 RX ADMIN — SERTRALINE HYDROCHLORIDE 100 MG: 50 TABLET ORAL at 08:55

## 2020-08-05 RX ADMIN — HYDROCODONE BITARTRATE AND ACETAMINOPHEN 1 TABLET: 7.5; 325 TABLET ORAL at 06:21

## 2020-08-05 RX ADMIN — ENOXAPARIN SODIUM 40 MG: 40 INJECTION SUBCUTANEOUS at 08:56

## 2020-08-05 RX ADMIN — SODIUM CHLORIDE, PRESERVATIVE FREE 3 ML: 5 INJECTION INTRAVENOUS at 20:18

## 2020-08-05 NOTE — PLAN OF CARE
Problem: Patient Care Overview  Goal: Plan of Care Review  Outcome: Ongoing (interventions implemented as appropriate)  Flowsheets (Taken 8/5/2020 9149)  Progress: no change  Plan of Care Reviewed With: patient  Outcome Summary: No acute events overnight.  Pain controlled per MAR.  Neurovascular assessment of RLE has been negative thus far.  Will continue to monitor pain.

## 2020-08-05 NOTE — PROGRESS NOTES
Discharge Planning Assessment   [facility]     Patient Name: Elli Edwards  MRN: [id number]  Today's Date: 8/5/2020    Admit Date: 8/4/2020    Discharge Needs Assessment     Row Name 08/05/20 1014       Living Environment    Lives With  spouse;child(alex), adult    Name(s) of Who Lives With Patient  Juan Edwards     Current Living Arrangements  home/apartment/condo    Primary Care Provided by  self    Provides Primary Care For  no one    Family Caregiver if Needed  child(alex), adult;spouse    Quality of Family Relationships  involved    Able to Return to Prior Arrangements  yes       Transition Planning    Patient/Family Anticipates Transition to  home    Patient/Family Anticipated Services at Transition  none    Transportation Anticipated  car, drives self;family or friend will provide       Discharge Needs Assessment    Readmission Within the Last 30 Days  no previous admission in last 30 days    Concerns to be Addressed  no discharge needs identified    Equipment Currently Used at Home  walker, rolling;grab bar;wheelchair        Discharge Plan     Row Name 08/05/20 1016       Plan    Plan  Home with Home Health     Provided Post Acute Provider List?  Refused    Provided Post Acute Provider Quality & Resource List?  Refused    Plan Comments  Case Management spoke to pt regarding discharge plans  Confirmed address and phone number She lives in a one level house daughter lives below her She has a walker WC step in shower grab bars Hip kit  plan is to return home with Home  Health  She would prefer the provider that her  had She will find this information from her daughter  She will need a BSC and from List provided jose roberto BECKER  She reports  she has a POA  but not a Health Surrogate gave her information regarding this         Destination      Coordination has not been started for this encounter.      Durable Medical Equipment      Coordination has not been started for this encounter.      Dialysis/Infusion       Coordination has not been started for this encounter.      Home Medical Care      Coordination has not been started for this encounter.      Therapy      Coordination has not been started for this encounter.      Community Resources      Coordination has not been started for this encounter.        Expected Discharge Date and Time     Expected Discharge Date Expected Discharge Time    Aug 7, 2020         Demographic Summary     Row Name 08/05/20 1013       General Information    Admission Type  inpatient    Referral Source  admission list        Functional Status     Row Name 08/05/20 1013       Functional Status    Usual Activity Tolerance  good       Functional Status, IADL    Medications  independent    Meal Preparation  independent    Housekeeping  independent    Laundry  independent    Shopping  independent       Mental Status    General Appearance WDL  WDL        Psychosocial    No documentation.       Abuse/Neglect    No documentation.       Legal     Row Name 08/05/20 1013       Financial/Legal    Finance Comments  Denies any financial for medication food utilities         Substance Abuse    No documentation.       Patient Forms    No documentation.           Nan Espinoza RN

## 2020-08-05 NOTE — PROGRESS NOTES
[facility]    Name:  Elli Edwards   Age:  69 y.o.  Sex:  female  :  1950  MRN:  [id number]   Visit Number:  [id number]  Admission Date:  2020  Date Of Service:  20  Primary Care Physician:  Elli Trinh MD     LOS: 1 day :  Patient Care Team:  Elli Trinh MD as PCP - General:    Chief Complaint:      Follow-up right total hip arthroplasty    Subjective / Interval History:     Patient sitting in bedside chair without acute concerns today.  She notes some mild hip pain, otherwise feels well.  Voiding well.  No abdominal pain.  Pain medication can make her nauseous.  Denies any shortness of breath.  No other concerns at this time.    Prior work-up:  The patient is a 69-year-old female who underwent elective right total hip replacement this morning with Dr. Peoples.  We were asked to see patient in consultation for perioperative medical management.  She does have a medical history of osteoarthritis, hypertension, GERD, hyperlipidemia, vitamin D deficiency.  She denies any recent medication changes.  Her daughter is a nurse and is at bedside.  She states she was on a statin, but this caused myalgias and was discontinued.  She otherwise takes the lisinopril in addition to vitamin D and Os-Jessee.  She is on Zoloft as well.  She admits to some mild discomfort in her right hip currently, but otherwise denies any acute complaints.  Denies any shortness of breath.  She does state she has a deviated septum, does have low oxygen at times at night when she is sleeping when she has been in the hospital before.  Denies any diagnosis of sleep apnea.    Review of Systems:     General ROS: Patient denies any fevers, chills or loss of consciousness.  Respiratory ROS: Denies cough or shortness of breath.  Cardiovascular ROS: Denies chest pain or palpitations. No history of exertional chest pain.  Gastrointestinal ROS: Denies nausea and vomiting. Denies any  abdominal pain. No diarrhea.  Neurological ROS: Denies any focal weakness. No loss of consciousness. Denies any numbness.  Dermatological ROS: Denies any redness or pruritis.    Vital Signs:    Temp:  [97.5 °F (36.4 °C)-98.4 °F (36.9 °C)] 98.4 °F (36.9 °C)  Heart Rate:  [] 98  Resp:  [16-20] 20  BP: (108-143)/(56-72) 108/56    Intake and output:    I/O last 3 completed shifts:  In: 3915 [P.O.:240; I.V.:3675]  Out: 700 [Urine:700]  I/O this shift:  In: 360 [P.O.:360]  Out: 400 [Urine:400]    Physical Examination:    General Appearance:  Alert and cooperative, not in any acute distress.   Head:  Atraumatic and normocephalic, without obvious abnormality.   Eyes:          PERRLA, conjunctivae and sclerae normal, no Icterus. No pallor. Extraocular movements are within normal limits.   Neck: Supple, trachea midline, no thyromegaly.   Lungs:   Breath sounds heard bilaterally equally.  No crackles or wheezing. No Pleural rub or bronchial breathing.   Heart:  Normal S1 and S2, no murmur, no gallop, no rub. No JVD   Abdomen:   Normal bowel sounds, no masses, no organomegaly. Soft, non-tender, non-distended, no guarding, no rebound tenderness.  Obese   Extremities: Moves all extremities well, no edema, no cyanosis, no clubbing.  Right hip bandage is intact, there is no surrounding edema or erythema.   Skin: No bleeding, bruising or rash.   Neurologic: Awake, alert and oriented times 3. Moves all 4 extremities equally.     Laboratory results:    Results from last 7 days   Lab Units 08/05/20  0547   SODIUM mmol/L 138   POTASSIUM mmol/L 3.5   CHLORIDE mmol/L 96*   CO2 mmol/L 31.4*   BUN mg/dL 13   CREATININE mg/dL 0.65   CALCIUM mg/dL 8.9   GLUCOSE mg/dL 140*     Results from last 7 days   Lab Units 08/05/20  0547   WBC 10*3/mm3 14.77*   HEMOGLOBIN g/dL 12.0   HEMATOCRIT % 36.5   PLATELETS 10*3/mm3 228                       I have reviewed the patient's laboratory results.    Radiology results:    Imaging Results (Last 24  Hours)     Procedure Component Value Units Date/Time     Antoine OR Procedure [[id number]] Resulted:  08/04/20 2105     Updated:  08/04/20 2105          I have reviewed the patient's radiology reports.    Medication Review:     I have reviewed the patients active and prn medications.       Arthralgia of right hip    Primary osteoarthritis of right hip    Status post total hip replacement, right      Assessment:    1.  Primary right hip osteoarthritis, status post total hip replacement  2.  Essential hypertension  3.  Dyslipidemia  4.  GERD  5.  History of colon polyps  6.  Obesity  7.  Deviated septum    Plan:    Patient doing well status post day 1 from elective total right hip arthroplasty.  Vital signs reviewed and overall stable.  Laboratory work-up largely unremarkable, mildly elevated white count but suspect stress response to surgery.  We will continue with current medication regimen.  Continue with antiemetics and as needed pain control.  Continue physical therapy evaluations.  DVT prophylaxis.    Otherwise, rest of care per primary service which is orthopedics.  Please contact with any questions or concerns.    Kat Isaac DO  08/05/20  12:58    Dictated utilizing Dragon dictation.

## 2020-08-05 NOTE — PLAN OF CARE
Problem: Patient Care Overview  Goal: Plan of Care Review  Flowsheets (Taken 8/5/2020 2214)  Outcome Summary: Pt toglerates treatment well advancing gait distance to 164' cga/sba with rw as well as R LE ther ex.   See flowsheet for details.

## 2020-08-05 NOTE — PROGRESS NOTES
No anesthesia related problems   Schultz    Nerve Cath Post Op Call    Patient Name: Elli Edwards  :  1950  MRN:  [id number]  Date of Discharge:     POST OP CALL

## 2020-08-05 NOTE — THERAPY TREATMENT NOTE
Acute Care - Physical Therapy Treatment Note  [facility]     Patient Name: Elli Edwards  : 1950  MRN: [id number]  Today's Date: 2020  Onset of Illness/Injury or Date of Surgery: 20     Referring Physician: Dr. Peoples    Admit Date: 2020    Visit Dx:    ICD-10-CM ICD-9-CM   1. Impaired mobility and ADLs Z74.09 V49.89    Z78.9    2. Arthralgia of right hip M25.551 719.45   3. Primary osteoarthritis of right hip M16.11 715.15     Patient Active Problem List   Diagnosis   • Benign essential hypertension   • Polyp of colon   • Heartburn   • Hyperlipidemia   • Phlebitis   • Vertigo   • Vitamin D deficiency   • Internal hemorrhoids   • Diverticulosis of colon   • Diarrhea   • Arthralgia of right hip   • Primary osteoarthritis of right hip   • Status post total hip replacement, right       Therapy Treatment    Rehabilitation Treatment Summary     Row Name 20             Treatment Time/Intention    Discipline  physical therapy assistant  -RM      Document Type  therapy note (daily note)  -RM      Subjective Information  complains of;weakness;fatigue;pain  -RM      Mode of Treatment  physical therapy  -RM      Care Plan Review  care plan/treatment goals reviewed  -RM      Patient Effort  good  -RM      Existing Precautions/Restrictions  fall;right;hip, posterior  -RM      Recorded by [RM] Aime Angeles, PTA 20 1231      Row Name 2014             Safety Issues, Functional Mobility    Safety Issues Affecting Function (Mobility)  positioning of assistive device;sequencing abilities  -RM      Impairments Affecting Function (Mobility)  pain;strength;endurance/activity tolerance  -RM      Recorded by [RM] Aime Angeles, PTA 20 1231      Row Name 20 0814             Mobility Assessment/Intervention    Right Lower Extremity (Weight-bearing Status)  weight-bearing as tolerated (WBAT)  -RM      Recorded by [RM] Aime Angeles, PTA 20 1231      Row Name  08/05/20 0814             Bed Mobility Assessment/Treatment    Bed Mobility Assessment/Treatment  sit-supine  -RM      Supine-Sit Vance (Bed Mobility)  contact guard;minimum assist (75% patient effort);verbal cues  -RM      Bed Mobility, Safety Issues  decreased use of legs for bridging/pushing  -RM      Assistive Device (Bed Mobility)  bed rails;head of bed elevated  -RM      Recorded by [RM] Aime Angeles, PTA 08/05/20 1231      Row Name 08/05/20 0814             Sit-Stand Transfer    Sit-Stand Vance (Transfers)  contact guard;verbal cues  -RM      Assistive Device (Sit-Stand Transfers)  walker, front-wheeled  -RM      Recorded by [] Aime Angeles, PTA 08/05/20 1231      Row Name 08/05/20 0814             Stand-Sit Transfer    Stand-Sit Vance (Transfers)  contact guard;verbal cues  -RM      Assistive Device (Stand-Sit Transfers)  walker, front-wheeled  -RM      Recorded by [] Aime Angeles, PTA 08/05/20 1231      Row Name 08/05/20 0814             Gait/Stairs Assessment/Training    Vance Level (Gait)  contact guard;stand by assist;verbal cues  -RM      Assistive Device (Gait)  walker, front-wheeled  -RM      Distance in Feet (Gait)  164  -RM      Pattern (Gait)  step-through  -RM      Deviations/Abnormal Patterns (Gait)  antalgic;gait speed decreased;stride length decreased  -RM      Right Sided Gait Deviations  weight shift ability decreased  -RM      Recorded by [RM] Aime Angeles, PTA 08/05/20 1231      Row Name 08/05/20 0814             Motor Skills Assessment/Interventions    Additional Documentation  Therapeutic Exercise (Group)  -RM      Recorded by [RM] Aime Angeles, PTA 08/05/20 1231      Row Name 08/05/20 0814             Therapeutic Exercise    Lower Extremity (Therapeutic Exercise)  gluteal sets;gastroc stretch, bilateral;heel slides, right;quad sets, right  -RM      Exercise Type (Therapeutic Exercise)  AROM (active range of motion);AAROM (active  assistive range of motion)  -RM      Position (Therapeutic Exercise)  seated  -RM      Sets/Reps (Therapeutic Exercise)  x10  -RM      Expected Outcome (Therapeutic Exercise)  improve performance, gait skills  -RM      Recorded by [RM] Aime Angeles, hospitals 08/05/20 1231      Row Name 08/05/20 0814             Dynamic Sitting Balance    Level of [place], Reaches Outside Midline (Sitting, Dynamic Balance)  standby assist  -RM      Sitting Position, Reaches Outside Midline (Sitting, Dynamic Balance)  sitting on edge of bed  -RM      Recorded by [] Aime Angeles, PTA 08/05/20 1231      Row Name 08/05/20 0814             Static Standing Balance    Level of [place] (Supported Standing, Static Balance)  standby assist  -RM      Time Able to Maintain Position (Supported Standing, Static Balance)  2 to 3 minutes  -RM      Assistive Device Utilized (Supported Standing, Static Balance)  walker, rolling  -RM      Recorded by [] Aime Angeles, PTA 08/05/20 1231      Row Name 08/05/20 0814             Dynamic Standing Balance    Level of [place], Reaches Outside Midline (Standing, Dynamic Balance)  standby assist;contact guard assist  -RM      Time Able to Maintain Position, Reaches Outside Midline (Standing, Dynamic Balance)  more than 5 minutes  -RM      Assistive Device Utilized (Supported Standing, Dynamic Balance)  walker, rolling  -RM      Recorded by [] Aime Angeles, hospitals 08/05/20 1231      Row Name 08/05/20 0814             Positioning and Restraints    Pre-Treatment Position  in bed  -RM      Post Treatment Position  chair  -RM      In Chair  reclined;call light within reach;encouraged to call for assist;notified nsg  -RM      Recorded by [RM] Aime Angeles, PTA 08/05/20 1231      Row Name 08/05/20 0814             Pain Scale: Numbers Pre/Post-Treatment    Pain Scale: Numbers, Pretreatment  2/10  -RM      Pain Scale: Numbers, Post-Treatment  1/10  -RM      Pain Location - Side   Right  -RM      Pain Location  hip  -RM      Pain Intervention(s)  Repositioned;Ambulation/increased activity  -RM      Recorded by [] Aime Angeles, PTA 08/05/20 1231      Row Name                Wound 08/04/20 0836 Right anterior hip Incision    Wound - Properties Group Date first assessed: 08/04/20 [] Time first assessed: 0836 [] Side: Right [MH] Orientation: anterior [MH] Location: hip [MH] Primary Wound Type: Incision [MH] Recorded by:  [] Valentine Cartwright RN 08/04/20 0836    Row Name 08/05/20 0814             Plan of Care Review    Plan of Care Reviewed With  patient  -RM      Progress  improving  -RM      Recorded by [] Aime Angeles, PTA 08/05/20 1231      Row Name 08/05/20 0814             Outcome Summary/Treatment Plan (PT)    Daily Summary of Progress (PT)  progress toward functional goals is good  -RM      Plan for Continued Treatment (PT)  Cont PT per POC.   -RM      Recorded by [] Aime Angeles, PTA 08/05/20 1231        User Key  (r) = Recorded By, (t) = Taken By, (c) = Cosigned By    Initials Name Effective Dates Discipline     Aime Angeles, PTA 03/07/18 -  PT     Valentine Cartwright RN 03/18/20 -  Nurse          Wound 08/04/20 0836 Right anterior hip Incision (Active)   Dressing Appearance dry;intact;no drainage 8/5/2020  8:00 AM   Closure BASIM 8/4/2020  8:00 PM   Periwound warm;dry 8/4/2020  8:00 PM   Periwound Temperature warm 8/4/2020  8:00 PM   Periwound Skin Turgor soft 8/4/2020  8:00 PM   Dressing Care, Wound hydrocolloid 8/5/2020  8:00 AM       Rehab Goal Summary     Row Name 08/05/20 0922             Occupational Therapy Goals    Bed Mobility Goal Selection (OT)  bed mobility, OT goal 1  -AH      Transfer Goal Selection (OT)  transfer, OT goal 1  -AH      Dressing Goal Selection (OT)  dressing, OT goal 1  -AH      Toileting Goal Selection (OT)  toileting, OT goal 1  -AH      Strength Goal Selection (OT)  strength, OT goal 1  -AH      Functional Mobility Goal Selection  (OT)  functional mobility, OT goal 1  -AH         Bed Mobility Goal 1 (OT)    Activity/Assistive Device (Bed Mobility Goal 1, OT)  supine to sit  -AH      [place] Level/Cues Needed (Bed Mobility Goal 1, OT)  contact guard assist  -AH      Time Frame (Bed Mobility Goal 1, OT)  by discharge  -      Progress/Outcomes (Bed Mobility Goal 1, OT)  goal ongoing  -AH         Transfer Goal 1 (OT)    Activity/Assistive Device (Transfer Goal 1, OT)  sit-to-stand/stand-to-sit;walker, rolling  -AH      [place] Level/Cues Needed (Transfer Goal 1, OT)  supervision required  -AH      Time Frame (Transfer Goal 1, OT)  by discharge  -      Progress/Outcome (Transfer Goal 1, OT)  goal ongoing  -AH         Dressing Goal 1 (OT)    Activity/Assistive Device (Dressing Goal 1, OT)  lower body dressing;reacher;sock-aid  -AH      [place]/Cues Needed (Dressing Goal 1, OT)  contact guard assist  -AH      Time Frame (Dressing Goal 1, OT)  by discharge  -      Progress/Outcome (Dressing Goal 1, OT)  goal ongoing  -         Toileting Goal 1 (OT)    Activity/Device (Toileting Goal 1, OT)  adjust/manage clothing;perform perineal hygiene  -AH      [place] Level/Cues Needed (Toileting Goal 1, OT)  contact guard assist  -AH      Time Frame (Toileting Goal 1, OT)  by discharge  -      Progress/Outcome (Toileting Goal 1, OT)  goal ongoing  -         Strength Goal 1 (OT)    Strength Goal 1 (OT)  Pt will perform UB strengthening ex using theraband for resistance.  -AH      Time Frame (Strength Goal 1, OT)  by discharge  -      Progress/Outcome (Strength Goal 1, OT)  goal ongoing  -AH         Functional Mobility Goal 1 (OT)    Activity/Assistive Device (Functional Mobility Goal 1, OT)  walker, rolling  -AH      [place] Level/Cues Needed (Functional Mobility Goal 1, OT)  contact guard assist  -AH      Distance Goal 1 (Functional Mobility, OT)  100  -AH      Time Frame (Functional Mobility Goal 1, OT)  long term goal  (LTG);1 week  -      Progress/Outcome (Functional Mobility Goal 1, OT)  goal ongoing  -        User Key  (r) = Recorded By, (t) = Taken By, (c) = Cosigned By    Initials Name Provider Type Discipline    Monie Noel Occupational Therapist OT          Physical Therapy Education                 Title: PT OT SLP Therapies (In Progress)     Topic: Physical Therapy (In Progress)     Point: Mobility training (Done)     Description:   Instruct learner(s) on safety and technique for assisting patient out of bed, chair or wheelchair.  Instruct in the proper use of assistive devices, such as walker, crutches, cane or brace.              Patient Friendly Description:   It's important to get you on your feet again, but we need to do so in a way that is safe for you. Falling has serious consequences, and your personal safety is the most important thing of all.        When it's time to get out of bed, one of us or a family member will sit next to you on the bed to give you support.     If your doctor or nurse tells you to use a walker, crutches, a cane, or a brace, be sure you use it every time you get out of bed, even if you think you don't need it.    Learning Progress Summary           Patient Acceptance, E,TB,D, VU,NR by  at 8/5/2020 1232    Comment:  HEP copy given to pt and one placed in chart, sequencing during gait and THP.    Acceptance, E,TB, VU by  at 8/4/2020 1932    Comment:  Role of PT and POC                   Point: Home exercise program (Done)     Description:   Instruct learner(s) on appropriate technique for monitoring, assisting and/or progressing patient with therapeutic exercises and activities.              Learning Progress Summary           Patient Acceptance, E,TB,D, VU,NR by  at 8/5/2020 1232    Comment:  HEP copy given to pt and one placed in chart, sequencing during gait and THP.                   Point: Body mechanics (Not Started)     Description:   Instruct learner(s) on proper  positioning and spine alignment for patient and/or caregiver during mobility tasks and/or exercises.              Learner Progress:   Not documented in this visit.          Point: Precautions (Done)     Description:   Instruct learner(s) on prescribed precautions during mobility and gait tasks              Learning Progress Summary           Patient Acceptance, E,TB,D, VU,NR by  at 8/5/2020 1232    Comment:  HEP copy given to pt and one placed in chart, sequencing during gait and THP.                               User Key     Initials Effective Dates Name Provider Type Discipline     04/03/18 -  Little Hagen, PT Physical Therapist PT     03/07/18 -  Aime Angeles, PTA Physical Therapy Assistant PT                PT Recommendation and Plan     Outcome Summary/Treatment Plan (PT)  Daily Summary of Progress (PT): progress toward functional goals is good  Plan for Continued Treatment (PT): Cont PT per POC.   Plan of Care Reviewed With: patient  Progress: improving  Outcome Summary: Pt toglerates treatment well advancing gait distance to 164' cga/sba with rw as well as R LE ther ex.   See flowsheet for details.  Outcome Measures     Row Name 08/05/20 0922 08/05/20 0814          How much help from another person do you currently need...    Turning from your back to your side while in flat bed without using bedrails?  --  3  -RM     Moving from lying on back to sitting on the side of a flat bed without bedrails?  --  3  -RM     Moving to and from a bed to a chair (including a wheelchair)?  --  3  -RM     Standing up from a chair using your arms (e.g., wheelchair, bedside chair)?  --  4  -RM     Climbing 3-5 steps with a railing?  --  2  -RM     To walk in hospital room?  --  3  -RM     AM-PAC 6 Clicks Score (PT)  --  18  -RM        How much help from another is currently needed...    Putting on and taking off regular lower body clothing?  2  -AH  --     Bathing (including washing, rinsing, and drying)  3  -AH   --     Toileting (which includes using toilet bed pan or urinal)  3  -AH  --     Putting on and taking off regular upper body clothing  4  -  --     Taking care of personal grooming (such as brushing teeth)  4  -AH  --     Eating meals  4  -  --     AM-PAC 6 Clicks Score (OT)  20  -AH  --        Functional Assessment    Outcome Measure Options  AM-PAC 6 Clicks Daily Activity (OT)  -  AM-PAC 6 Clicks Basic Mobility (PT)  -RM       User Key  (r) = Recorded By, (t) = Taken By, (c) = Cosigned By    Initials Name Provider Type    Monie Noel Occupational Therapist    Aime Tovar, SWETA Physical Therapy Assistant         Time Calculation:   PT Charges     Row Name 08/05/20 1233             Time Calculation    Start Time  0814  -RM      Stop Time  0850  -RM      Time Calculation (min)  36 min  -RM      PT Received On  08/05/20  -RM      PT Goal Re-Cert Due Date  08/14/20  -RM         Time Calculation- PT    Total Timed Code Minutes- PT  36 minute(s)  -RM         Timed Charges    03109 - PT Therapeutic Exercise Minutes  18  -RM      57891 - Gait Training Minutes   18  -RM        User Key  (r) = Recorded By, (t) = Taken By, (c) = Cosigned By    Initials Name Provider Type    Aime Tovar, SWETA Physical Therapy Assistant        Therapy Charges for Today     Code Description Service Date Service Provider Modifiers Qty    93601921370 HC PT THER PROC EA 15 MIN 8/5/2020 Aime Angeles, PTA GP 1    48474054450 HC GAIT TRAINING EA 15 MIN 8/5/2020 Aime Angeles, PTA GP 1          PT G-Codes  Outcome Measure Options: AM-PAC 6 Clicks Daily Activity (OT)  AM-PAC 6 Clicks Score (PT): 18  AM-PAC 6 Clicks Score (OT): 20    Aime Angeles PTA  8/5/2020

## 2020-08-05 NOTE — PROGRESS NOTES
"      [facility]  PROGRESS NOTE    Name:  Elli Edwards   Age:  69 y.o.  Sex:  female  :  1950  MRN:  [id number]   Visit Number:  [id number]  Admission Date:  2020  Date Of Service:  20  Primary Care Physician:  Elli Trinh MD     LOS: 1 day :  Patient Care Team:  Elli Trinh MD as PCP - General:    Chief Complaint:      \" My pain isnt that bad\"    Subjective / Interval History:     Patient is postop day #1 elective right total hip arthroplasty.  Patient tolerated the surgery well.  She currently denies chest pain or shortness of breath.  She states the pain medication does help but makes her feel nauseous.     Review of Systems:     General ROS: No fever spike, no acute cognitive change.  Ophthalmic ROS: no acute visual disturbance.  ENT ROS: No acute sinus congestion.   Respiratory ROS: No new shortness of breath.   Cardiovascular ROS: No new chest pain.  Gastrointestinal ROS: + NAUSEA No acute abdominal change. Non-distended.  Genito-Urinary ROS: No reported dysuria.  Musculoskeletal ROS: No deep calf pain. No acute focal motor deficit  Neurological ROS: No new numbness or tingling.  Dermatological ROS: No erythema.  No cyanosis.  No rash or pruritis.    Vital Signs:    Temp:  [96.8 °F (36 °C)-98.4 °F (36.9 °C)] 98.4 °F (36.9 °C)  Heart Rate:  [] 98  Resp:  [14-20] 20  BP: (108-143)/(56-72) 108/56    Intake and output:    I/O last 3 completed shifts:  In: 3915 [P.O.:240; I.V.:3675]  Out: 700 [Urine:700]  I/O this shift:  In: 240 [P.O.:240]  Out: 400 [Urine:400]    Physical Examination:    General Appearance:    Alert, cooperative, and no acute distress.   Head:    Atraumatic and normocephalic, without obvious abnormality.   Eyes:    Extraocular movements are within normal limits.   ENT:   No acute change.   Neck:   Supple, trachea midline.   Lungs:     Normal respirations, unlabored.    Heart:    Regular rate.   Abdomen:     Soft, nondistended.   Extremities:  "  No new motor deficit, no calf pain, Homans sign negative.   Skin:     No rash.  No cyanosis.  No erythema.  No active drainage.       Neurologic:   Sensation intact, pulses intact.     Laboratory results:    Lab Results (last 24 hours)     Procedure Component Value Units Date/Time    Basic Metabolic Panel [[id number]]  (Abnormal) Collected:  08/05/20 0547    Specimen:  Blood Updated:  08/05/20 0633     Glucose 140 mg/dL      BUN 13 mg/dL      Creatinine 0.65 mg/dL      Sodium 138 mmol/L      Potassium 3.5 mmol/L      Chloride 96 mmol/L      CO2 31.4 mmol/L      Calcium 8.9 mg/dL      eGFR Non African Amer 90 mL/min/1.73      BUN/Creatinine Ratio 20.0     Anion Gap 10.6 mmol/L     Narrative:       GFR Normal >60  Chronic Kidney Disease <60  Kidney Failure <15      CBC & Differential [[id number]] Collected:  08/05/20 0547    Specimen:  Blood Updated:  08/05/20 0620    Narrative:       The following orders were created for panel order CBC & Differential.  Procedure                               Abnormality         Status                     ---------                               -----------         ------                     CBC Auto Differential[[id number]]        Abnormal            Final result                 Please view results for these tests on the individual orders.    CBC Auto Differential [[id number]]  (Abnormal) Collected:  08/05/20 0547    Specimen:  Blood Updated:  08/05/20 0620     WBC 14.77 10*3/mm3      RBC 3.99 10*6/mm3      Hemoglobin 12.0 g/dL      Hematocrit 36.5 %      MCV 91.5 fL      MCH 30.1 pg      MCHC 32.9 g/dL      RDW 12.8 %      RDW-SD 43.1 fl      MPV 11.5 fL      Platelets 228 10*3/mm3      Neutrophil % 76.5 %      Lymphocyte % 10.2 %      Monocyte % 12.5 %      Eosinophil % 0.0 %      Basophil % 0.1 %      Immature Grans % 0.7 %      Neutrophils, Absolute 11.30 10*3/mm3      Lymphocytes, Absolute 1.50 10*3/mm3      Monocytes, Absolute 1.85 10*3/mm3      Eosinophils, Absolute 0.00 10*3/mm3       Basophils, Absolute 0.02 10*3/mm3      Immature Grans, Absolute 0.10 10*3/mm3      nRBC 0.0 /100 WBC     Tissue Pathology Exam [[id number]] Collected:  08/04/20 0904    Specimen:  Bone from Hip, Right Updated:  08/04/20 1342          I have reviewed the patient's laboratory results.    Radiology results:    Imaging Results (Last 24 Hours)     Procedure Component Value Units Date/Time     [facility] OR Procedure [[id number]] Resulted:  08/04/20 2105     Updated:  08/04/20 2105          I have reviewed the patient's radiology reports.    AP and lateral taken perioperative shows no acute concern.  Good position and alignment of implants and/or fracture.       Assessment/Plan      Arthralgia of right hip    Primary osteoarthritis of right hip    Status post total hip replacement, right       S/P Right OLI:    Continue current management per the detailed orders and instructions, including skin, safety and fall precautions, respiratory therapy with incentive spirometer, advance diet as tolerated, bowel regimen, multi-modal analgesia, DVT prophylaxis, Hospitalist medical management, PT/OT following post-surgical rehab protocol, and Case Management for discharge and rehabilitation plans.  Plan to DC Home tomorrow  Do not change dressing. Patient will change dressing POD # 7  Try tylenol 500mg orally instead of the norco. DO not exceed 4 gm of tylenol daily.   Anti-emetics are ordered    Oliverio Brock PA-C  08/05/20  12:01

## 2020-08-05 NOTE — PLAN OF CARE
Problem: Patient Care Overview  Goal: Plan of Care Review  Outcome: Ongoing (interventions implemented as appropriate)  Flowsheets (Taken 8/5/2020 9528)  Progress: no change  Plan of Care Reviewed With: patient  Outcome Summary: Pt seen for OT evaluation today.  Pt presents with weakness and decreased independence with self care and functional mobility tasks.  Pt is expected to benefit from skilled OT to improve her independence with ADL tasks.

## 2020-08-05 NOTE — THERAPY EVALUATION
Acute Care - Occupational Therapy Initial Evaluation  [facility]     Patient Name: Elli Edwards  : 1950  MRN: [id number]  Today's Date: 2020  Onset of Illness/Injury or Date of Surgery: 20  Date of Referral to OT: 20  Referring Physician: Dr. Peoples    Admit Date: 2020       ICD-10-CM ICD-9-CM   1. Impaired mobility and ADLs Z74.09 V49.89    Z78.9    2. Arthralgia of right hip M25.551 719.45   3. Primary osteoarthritis of right hip M16.11 715.15     Patient Active Problem List   Diagnosis   • Benign essential hypertension   • Polyp of colon   • Heartburn   • Hyperlipidemia   • Phlebitis   • Vertigo   • Vitamin D deficiency   • Internal hemorrhoids   • Diverticulosis of colon   • Diarrhea   • Arthralgia of right hip   • Primary osteoarthritis of right hip   • Status post total hip replacement, right     Past Medical History:   Diagnosis Date   • Arthritis    • Blisters of multiple sites     lower back   • Cataract     PT REPORTS BILATERAL EYES   • Colon polyp 2015   • Colon polyps 10/08/2019   • Depression    • Deviated septum     has to have breathe [employer] or tape on nose for surgery   • Diarrhea    • Diverticulosis    • Dry eyes    • Elevated cholesterol    • Frequency of urination    • GERD (gastroesophageal reflux disease)    • H/O cardiovascular stress test 10/07/2019    10+ years ago    • Hearing loss     Patient reported no use of hearing aids   • Hip pain     right hip   • Hypertension    • Hypothyroid    • Impaired functional mobility, balance, gait, and endurance    • Localized osteoarthritis of right knee    • Piercing     ears only   • Seasonal allergies     allergic to trees, grass/foxfire burns   • Tinnitus    • Wears glasses      Past Surgical History:   Procedure Laterality Date   • ADENOIDECTOMY     • CATARACT EXTRACTION Bilateral    • CATARACT EXTRACTION W/ INTRAOCULAR LENS IMPLANT Right 2017    Procedure: RIGHT CATARACT PHACO EXTRACTION WITH  INTRAOCULAR LENS IMPLANT;  Surgeon: Miky Garza MD;  Location: [facility] OR;  Service:    • CATARACT EXTRACTION W/ INTRAOCULAR LENS IMPLANT Left 2/16/2017    Procedure: LEFT CATARACT PHACO EXTRACTION WITH INTRAOCULAR LENS IMPLANT;  Surgeon: Miky Garza MD;  Location: [facility] OR;  Service:    • CHOLECYSTECTOMY N/A 8/6/2019    Procedure: CHOLECYSTECTOMY LAPAROSCOPIC;  Surgeon: Oliver Aguilar MD;  Location: [facility] OR;  Service: General   • COLONOSCOPY  2016   • COLONOSCOPY N/A 10/8/2019    Procedure: COLONOSCOPY WITH POLYPECTOMY;  Surgeon: Lemuel Sagastume MD;  Location: [facility] ENDOSCOPY;  Service: Gastroenterology   • ENDOSCOPY     • LASER ABLATION      cervical   • MOUTH SURGERY      SEVERAL WISDOM TEETH EXTRACTED   • MOUTH SURGERY      ORAL IMPLANT   • TONSILLECTOMY      AT AGE 5   • TOTAL HIP ARTHROPLASTY Right 8/4/2020    Procedure: Total hip arthroplasty RIGHT (BIOMET);  Surgeon: Justice Peoples MD;  Location: [facility] OR;  Service: Orthopedics;  Laterality: Right;          OT ASSESSMENT FLOWSHEET (last 12 hours)      Occupational Therapy Evaluation     Row Name 08/05/20 0922                   OT Evaluation Time/Intention    Subjective Information  complains of;pain  -        Document Type  evaluation  -        Mode of Treatment  occupational therapy  -        Patient Effort  good  -        Symptoms Noted During/After Treatment  none  -           General Information    Patient Profile Reviewed?  yes  -        Onset of Illness/Injury or Date of Surgery  08/04/20  -        Referring Physician  Dr. Peoples  -        Patient Observations  alert;cooperative;agree to therapy  -        Patient/Family Observations  Pt alone  -        General Observations of Patient  Pt received sitting reclined in her chair  -        Prior Level of Function  independent:;community mobility;ADL's  -        Equipment Currently Used at Home  raised toilet;sock aid;dressing device;cane, straight;walker, rolling;walker,  standard;wheelchair;shower chair  -        Existing Precautions/Restrictions  fall;right;hip, posterior  -        Risks Reviewed  patient:;increased discomfort  -        Benefits Reviewed  patient:;improve function;increase independence;increase strength  -           Relationship/Environment    Lives With  spouse;child(alex), adult  -           Resource/Environmental Concerns    Current Living Arrangements  home/apartment/condo  -           Cognitive Assessment/Intervention- PT/OT    Orientation Status (Cognition)  oriented x 4  -        Follows Commands (Cognition)  WFL  -           Mobility Assessment/Treatment    Extremity Weight-bearing Status  right lower extremity  -        Right Lower Extremity (Weight-bearing Status)  weight-bearing as tolerated (WBAT)  -           Bed Mobility Assessment/Treatment    Comment (Bed Mobility)  Pt oob upon arrival  -           Functional Mobility    Functional Mobility- Ind. Level  contact guard assist  -        Functional Mobility- Device  rolling walker  -        Functional Mobility-Distance (Feet)  3  -           Transfer Assessment/Treatment    Transfer Assessment/Treatment  sit-stand transfer;stand-sit transfer  -           Sit-Stand Transfer    Sit-Stand [place] (Transfers)  contact guard  -        Assistive Device (Sit-Stand Transfers)  walker, front-wheeled  -           Stand-Sit Transfer    Stand-Sit [place] (Transfers)  contact guard  -        Assistive Device (Stand-Sit Transfers)  walker, front-wheeled  -           ADL Assessment/Intervention    BADL Assessment/Intervention  bathing;upper body dressing;lower body dressing;grooming;feeding;toileting  -           Bathing Assessment/Intervention    Bathing [place] Level  moderate assist (50% patient effort)  -           Upper Body Dressing Assessment/Training    Upper Body Dressing [place] Level  set up  -           Lower Body Dressing Assessment/Training     Lower Body Dressing [place] Level  moderate assist (50% patient effort)  -           Grooming Assessment/Training    [place] Level (Grooming)  set up  -           Self-Feeding Assessment/Training    [place] Level (Feeding)  independent  -           Toileting Assessment/Training    [place] Level (Toileting)  minimum assist (75% patient effort)  -           BADL Safety/Performance    Impairments, BADL Safety/Performance  pain;strength;range of motion  -           General ROM    GENERAL ROM COMMENTS  BUE WF  -           MMT (Manual Muscle Testing)    General MMT Comments  [facility]           Positioning and Restraints    Pre-Treatment Position  sitting in chair/recliner  -        Post Treatment Position  chair  -        In Chair  reclined;call light within reach;encouraged to call for assist  -           Pain Assessment    Additional Documentation  Pain Scale: Numbers Pre/Post-Treatment (Group)  -           Pain Scale: Numbers Pre/Post-Treatment    Pain Scale: Numbers, Pretreatment  1/10  -        Pain Scale: Numbers, Post-Treatment  1/10  -        Pain Location - Side  Right  -        Pain Location  hip  -        Pain Intervention(s)  Repositioned;Ambulation/increased activity  -           Wound 08/04/20 0836 Right anterior hip Incision    Wound - Properties Group Date first assessed: 08/04/20  [facility] Time first assessed: 0836  - Side: Right  - Orientation: anterior  - Location: hip  - Primary Wound Type: Incision  -       Coping    Observed Emotional State  accepting;cooperative  -        Verbalized Emotional State  acceptance  -           Plan of Care Review    Plan of Care Reviewed With  patient  -        Progress  no change  -        Outcome Summary  Pt seen for OT evaluation today.  Pt presents with weakness and decreased independence with self care and functional mobility tasks.  Pt is expected to benefit from skilled OT to improve her  independence with ADL tasks.  -           Clinical Impression (OT)    Date of Referral to OT  08/04/20  -        OT Diagnosis  ADL decline  -        Patient/Family Goals Statement (OT Eval)  Pt wants to d/c home with home health  -        Criteria for Skilled Therapeutic Interventions Met (OT Eval)  yes;treatment indicated  -        Rehab Potential (OT Eval)  good, to achieve stated therapy goals  -        Therapy Frequency (OT Eval)  3 times/wk  -        Care Plan Review (OT)  evaluation/treatment results reviewed;patient/other agree to care plan  -        Anticipated Discharge Disposition (OT)  home with home health  -           OT Goals    Bed Mobility Goal Selection (OT)  bed mobility, OT goal 1  -        Transfer Goal Selection (OT)  transfer, OT goal 1  -        Dressing Goal Selection (OT)  dressing, OT goal 1  -        Toileting Goal Selection (OT)  toileting, OT goal 1  -        Strength Goal Selection (OT)  strength, OT goal 1  -        Functional Mobility Goal Selection (OT)  functional mobility, OT goal 1  -        Additional Documentation  Strength Goal Selection (OT) (Row);Functional Mobility Selection (OT) (Row)  -           Bed Mobility Goal 1 (OT)    Activity/Assistive Device (Bed Mobility Goal 1, OT)  supine to sit  -        [place] Level/Cues Needed (Bed Mobility Goal 1, OT)  contact guard assist  -        Time Frame (Bed Mobility Goal 1, OT)  by discharge  -        Progress/Outcomes (Bed Mobility Goal 1, OT)  goal ongoing  -           Transfer Goal 1 (OT)    Activity/Assistive Device (Transfer Goal 1, OT)  sit-to-stand/stand-to-sit;walker, rolling  -        [place] Level/Cues Needed (Transfer Goal 1, OT)  supervision required  -        Time Frame (Transfer Goal 1, OT)  by discharge  -        Progress/Outcome (Transfer Goal 1, OT)  goal ongoing  -           Dressing Goal 1 (OT)    Activity/Assistive Device (Dressing Goal 1, OT)  lower body  dressing;reacher;sock-aid  -        [place]/Cues Needed (Dressing Goal 1, OT)  contact guard assist  -AH        Time Frame (Dressing Goal 1, OT)  by discharge  -        Progress/Outcome (Dressing Goal 1, OT)  goal ongoing  -           Toileting Goal 1 (OT)    Activity/Device (Toileting Goal 1, OT)  adjust/manage clothing;perform perineal hygiene  -        [place] Level/Cues Needed (Toileting Goal 1, OT)  contact guard assist  -AH        Time Frame (Toileting Goal 1, OT)  by discharge  -AH        Progress/Outcome (Toileting Goal 1, OT)  goal ongoing  -AH           Strength Goal 1 (OT)    Strength Goal 1 (OT)  Pt will perform UB strengthening ex using theraband for resistance.  -AH        Time Frame (Strength Goal 1, OT)  by discharge  -        Progress/Outcome (Strength Goal 1, OT)  goal ongoing  -           Functional Mobility Goal 1 (OT)    Activity/Assistive Device (Functional Mobility Goal 1, OT)  walker, rolling  -        [place] Level/Cues Needed (Functional Mobility Goal 1, OT)  contact guard assist  -AH        Distance Goal 1 (Functional Mobility, OT)  100  -AH        Time Frame (Functional Mobility Goal 1, OT)  long term goal (LTG);1 week  -        Progress/Outcome (Functional Mobility Goal 1, OT)  goal ongoing  -           Living Environment    Home Accessibility  wheelchair accessible  -          User Key  (r) = Recorded By, (t) = Taken By, (c) = Cosigned By    Initials Name Effective Dates    Monie Noel 03/07/18 -     Valentine Cote RN 03/18/20 -          Occupational Therapy Education                 Title: PT OT SLP Therapies (In Progress)     Topic: Occupational Therapy (In Progress)     Point: ADL training (Done)     Description:   Instruct learner(s) on proper safety adaptation and remediation techniques during self care or transfers.   Instruct in proper use of assistive devices.              Learning Progress Summary           Patient Acceptance, E,TB,  VU by  at 8/5/2020 6533    Comment:  Role of OT/POC                   Point: Home exercise program (Not Started)     Description:   Instruct learner(s) on appropriate technique for monitoring, assisting and/or progressing therapeutic exercises/activities.              Learner Progress:   Not documented in this visit.          Point: Precautions (Not Started)     Description:   Instruct learner(s) on prescribed precautions during self-care and functional transfers.              Learner Progress:   Not documented in this visit.          Point: Body mechanics (Not Started)     Description:   Instruct learner(s) on proper positioning and spine alignment during self-care, functional mobility activities and/or exercises.              Learner Progress:   Not documented in this visit.                      User Key     Initials Effective Dates Name Provider Type Discipline     03/07/18 -  Monie Ryan Occupational Therapist OT                  OT Recommendation and Plan  Outcome Summary/Treatment Plan (OT)  Anticipated Discharge Disposition (OT): home with home health  Therapy Frequency (OT Eval): 3 times/wk  Plan of Care Review  Plan of Care Reviewed With: patient  Plan of Care Reviewed With: patient  Outcome Summary: Pt seen for OT evaluation today.  Pt presents with weakness and decreased independence with self care and functional mobility tasks.  Pt is expected to benefit from skilled OT to improve her independence with ADL tasks.    Outcome Measures     Row Name 08/05/20 0922             How much help from another is currently needed...    Putting on and taking off regular lower body clothing?  2  -AH      Bathing (including washing, rinsing, and drying)  3  -AH      Toileting (which includes using toilet bed pan or urinal)  3  -AH      Putting on and taking off regular upper body clothing  4  -AH      Taking care of personal grooming (such as brushing teeth)  4  -AH      Eating meals  4  -AH      AM-PAC 6 Clicks  Score (OT)  20  [facility]         Functional Assessment    Outcome Measure Options  AM-PAC 6 Clicks Daily Activity (OT)  -        User Key  (r) = Recorded By, (t) = Taken By, (c) = Cosigned By    Initials Name Provider Type    Monie Noel Occupational Therapist          Time Calculation:   Time Calculation- OT     Row Name 08/05/20 1118             Time Calculation- OT    OT Start Time  0922  -      OT Received On  08/05/20  -      OT Goal Re-Cert Due Date  08/15/20  -        User Key  (r) = Recorded By, (t) = Taken By, (c) = Cosigned By    Initials Name Provider Type    Monie Noel Occupational Therapist        Therapy Charges for Today     Code Description Service Date Service Provider Modifiers Qty    [id number]  OT EVAL LOW COMPLEXITY 3 8/5/2020 Monie Ryan GO 1               Monie Ryan  8/5/2020

## 2020-08-06 ENCOUNTER — APPOINTMENT (OUTPATIENT)
Dept: GENERAL RADIOLOGY | Facility: HOSPITAL | Age: 70
End: 2020-08-06

## 2020-08-06 LAB
ANION GAP SERPL CALCULATED.3IONS-SCNC: 7.4 MMOL/L (ref 5–15)
BASOPHILS # BLD AUTO: 0.05 10*3/MM3 (ref 0–0.2)
BASOPHILS NFR BLD AUTO: 0.4 % (ref 0–1.5)
BUN SERPL-MCNC: 16 MG/DL (ref 8–23)
BUN/CREAT SERPL: 18.8 (ref 7–25)
CALCIUM SPEC-SCNC: 9.2 MG/DL (ref 8.6–10.5)
CHLORIDE SERPL-SCNC: 97 MMOL/L (ref 98–107)
CO2 SERPL-SCNC: 33.6 MMOL/L (ref 22–29)
CREAT SERPL-MCNC: 0.85 MG/DL (ref 0.57–1)
DEPRECATED RDW RBC AUTO: 43.8 FL (ref 37–54)
EOSINOPHIL # BLD AUTO: 0.01 10*3/MM3 (ref 0–0.4)
EOSINOPHIL NFR BLD AUTO: 0.1 % (ref 0.3–6.2)
ERYTHROCYTE [DISTWIDTH] IN BLOOD BY AUTOMATED COUNT: 13.1 % (ref 12.3–15.4)
GFR SERPL CREATININE-BSD FRML MDRD: 66 ML/MIN/1.73
GLUCOSE SERPL-MCNC: 117 MG/DL (ref 65–99)
HCT VFR BLD AUTO: 32.3 % (ref 34–46.6)
HGB BLD-MCNC: 10.6 G/DL (ref 12–15.9)
IMM GRANULOCYTES # BLD AUTO: 0.04 10*3/MM3 (ref 0–0.05)
IMM GRANULOCYTES NFR BLD AUTO: 0.3 % (ref 0–0.5)
LYMPHOCYTES # BLD AUTO: 1.22 10*3/MM3 (ref 0.7–3.1)
LYMPHOCYTES NFR BLD AUTO: 8.7 % (ref 19.6–45.3)
MAGNESIUM SERPL-MCNC: 1.9 MG/DL (ref 1.6–2.4)
MCH RBC QN AUTO: 30.2 PG (ref 26.6–33)
MCHC RBC AUTO-ENTMCNC: 32.8 G/DL (ref 31.5–35.7)
MCV RBC AUTO: 92 FL (ref 79–97)
MONOCYTES # BLD AUTO: 1.76 10*3/MM3 (ref 0.1–0.9)
MONOCYTES NFR BLD AUTO: 12.5 % (ref 5–12)
NEUTROPHILS NFR BLD AUTO: 11 10*3/MM3 (ref 1.7–7)
NEUTROPHILS NFR BLD AUTO: 78 % (ref 42.7–76)
NRBC BLD AUTO-RTO: 0 /100 WBC (ref 0–0.2)
PLATELET # BLD AUTO: 196 10*3/MM3 (ref 140–450)
PMV BLD AUTO: 10.9 FL (ref 6–12)
POTASSIUM SERPL-SCNC: 3.3 MMOL/L (ref 3.5–5.2)
RBC # BLD AUTO: 3.51 10*6/MM3 (ref 3.77–5.28)
SODIUM SERPL-SCNC: 138 MMOL/L (ref 136–145)
WBC # BLD AUTO: 14.08 10*3/MM3 (ref 3.4–10.8)

## 2020-08-06 PROCEDURE — A9270 NON-COVERED ITEM OR SERVICE: HCPCS | Performed by: FAMILY MEDICINE

## 2020-08-06 PROCEDURE — A9270 NON-COVERED ITEM OR SERVICE: HCPCS | Performed by: ORTHOPAEDIC SURGERY

## 2020-08-06 PROCEDURE — G0378 HOSPITAL OBSERVATION PER HR: HCPCS

## 2020-08-06 PROCEDURE — 99024 POSTOP FOLLOW-UP VISIT: CPT | Performed by: PHYSICIAN ASSISTANT

## 2020-08-06 PROCEDURE — 63710000001 HYDROCODONE-ACETAMINOPHEN 7.5-325 MG TABLET: Performed by: ORTHOPAEDIC SURGERY

## 2020-08-06 PROCEDURE — 25010000002 ONDANSETRON PER 1 MG: Performed by: PHYSICIAN ASSISTANT

## 2020-08-06 PROCEDURE — 25010000002 ONDANSETRON PER 1 MG: Performed by: ORTHOPAEDIC SURGERY

## 2020-08-06 PROCEDURE — 63710000001 POLYETHYLENE GLYCOL 17 G PACK: Performed by: FAMILY MEDICINE

## 2020-08-06 PROCEDURE — 63710000001 CALCIUM 500 MG VITAMIN D 5 MCG (200 UT) 500-5 MG-MCG TABLET: Performed by: ORTHOPAEDIC SURGERY

## 2020-08-06 PROCEDURE — 63710000001 LIDOCAINE VISCOUS HCL 2 % SOLUTION 15 ML CUP: Performed by: PHYSICIAN ASSISTANT

## 2020-08-06 PROCEDURE — 63710000001 PROMETHAZINE PER 12.5 MG: Performed by: PHYSICIAN ASSISTANT

## 2020-08-06 PROCEDURE — 63710000001 ACETAMINOPHEN 500 MG TABLET: Performed by: PHYSICIAN ASSISTANT

## 2020-08-06 PROCEDURE — A9270 NON-COVERED ITEM OR SERVICE: HCPCS | Performed by: PHYSICIAN ASSISTANT

## 2020-08-06 PROCEDURE — 74018 RADEX ABDOMEN 1 VIEW: CPT

## 2020-08-06 PROCEDURE — 97116 GAIT TRAINING THERAPY: CPT

## 2020-08-06 PROCEDURE — 97110 THERAPEUTIC EXERCISES: CPT

## 2020-08-06 PROCEDURE — 63710000001 POTASSIUM CHLORIDE 20 MEQ TABLET CONTROLLED-RELEASE: Performed by: PHYSICIAN ASSISTANT

## 2020-08-06 PROCEDURE — 63710000001 CALCIUM CARBONATE 500 MG CHEWABLE TABLET: Performed by: FAMILY MEDICINE

## 2020-08-06 PROCEDURE — 25810000003 SODIUM CHLORIDE 0.9 % WITH KCL 20 MEQ 20-0.9 MEQ/L-% SOLUTION: Performed by: FAMILY MEDICINE

## 2020-08-06 PROCEDURE — 80048 BASIC METABOLIC PNL TOTAL CA: CPT | Performed by: FAMILY MEDICINE

## 2020-08-06 PROCEDURE — 63710000001 ALUMINUM-MAGNESIUM HYDROXIDE-SIMETHICONE 400-400-40 MG/5ML SUSPENSION 30 ML CUP: Performed by: PHYSICIAN ASSISTANT

## 2020-08-06 PROCEDURE — 63710000001 CHOLECALCIFEROL 25 MCG (1000 UT) TABLET: Performed by: ORTHOPAEDIC SURGERY

## 2020-08-06 PROCEDURE — 63710000001 LISINOPRIL 20 MG TABLET: Performed by: ORTHOPAEDIC SURGERY

## 2020-08-06 PROCEDURE — 63710000001 SERTRALINE 50 MG TABLET: Performed by: ORTHOPAEDIC SURGERY

## 2020-08-06 PROCEDURE — 63710000001 FAMOTIDINE 20 MG TABLET: Performed by: PHYSICIAN ASSISTANT

## 2020-08-06 PROCEDURE — 85025 COMPLETE CBC W/AUTO DIFF WBC: CPT | Performed by: FAMILY MEDICINE

## 2020-08-06 PROCEDURE — 97530 THERAPEUTIC ACTIVITIES: CPT

## 2020-08-06 PROCEDURE — 99225 PR SBSQ OBSERVATION CARE/DAY 25 MINUTES: CPT | Performed by: FAMILY MEDICINE

## 2020-08-06 PROCEDURE — 83735 ASSAY OF MAGNESIUM: CPT | Performed by: PHYSICIAN ASSISTANT

## 2020-08-06 PROCEDURE — 25010000002 ENOXAPARIN PER 10 MG: Performed by: ORTHOPAEDIC SURGERY

## 2020-08-06 RX ORDER — SODIUM CHLORIDE 9 MG/ML
100 INJECTION, SOLUTION INTRAVENOUS CONTINUOUS
Status: DISCONTINUED | OUTPATIENT
Start: 2020-08-06 | End: 2020-08-06

## 2020-08-06 RX ORDER — POTASSIUM CHLORIDE 20 MEQ/1
20 TABLET, EXTENDED RELEASE ORAL DAILY
Status: COMPLETED | OUTPATIENT
Start: 2020-08-06 | End: 2020-08-07

## 2020-08-06 RX ORDER — PROMETHAZINE HYDROCHLORIDE 12.5 MG/1
12.5 TABLET ORAL EVERY 8 HOURS PRN
Status: DISCONTINUED | OUTPATIENT
Start: 2020-08-06 | End: 2020-08-07 | Stop reason: HOSPADM

## 2020-08-06 RX ORDER — PROMETHAZINE HYDROCHLORIDE 12.5 MG/1
12.5 TABLET ORAL EVERY 12 HOURS PRN
Qty: 14 TABLET | Refills: 0 | Status: SHIPPED | OUTPATIENT
Start: 2020-08-06 | End: 2020-08-17 | Stop reason: SDUPTHER

## 2020-08-06 RX ORDER — ONDANSETRON 4 MG/1
8 TABLET, FILM COATED ORAL EVERY 6 HOURS PRN
Status: DISCONTINUED | OUTPATIENT
Start: 2020-08-06 | End: 2020-08-07 | Stop reason: HOSPADM

## 2020-08-06 RX ORDER — FAMOTIDINE 20 MG/1
40 TABLET, FILM COATED ORAL DAILY
Status: COMPLETED | OUTPATIENT
Start: 2020-08-06 | End: 2020-08-06

## 2020-08-06 RX ORDER — DOCUSATE SODIUM 250 MG
250 CAPSULE ORAL DAILY
Qty: 14 CAPSULE | Refills: 0 | Status: SHIPPED | OUTPATIENT
Start: 2020-08-06 | End: 2022-08-18

## 2020-08-06 RX ORDER — ONDANSETRON 8 MG/1
8 TABLET, ORALLY DISINTEGRATING ORAL EVERY 8 HOURS PRN
Qty: 15 TABLET | Refills: 0 | Status: SHIPPED | OUTPATIENT
Start: 2020-08-06 | End: 2020-08-17 | Stop reason: SDUPTHER

## 2020-08-06 RX ORDER — ONDANSETRON 2 MG/ML
8 INJECTION INTRAMUSCULAR; INTRAVENOUS EVERY 6 HOURS PRN
Status: DISCONTINUED | OUTPATIENT
Start: 2020-08-06 | End: 2020-08-07 | Stop reason: HOSPADM

## 2020-08-06 RX ORDER — POLYETHYLENE GLYCOL 3350 17 G/17G
17 POWDER, FOR SOLUTION ORAL ONCE
Status: COMPLETED | OUTPATIENT
Start: 2020-08-06 | End: 2020-08-06

## 2020-08-06 RX ORDER — FAMOTIDINE 20 MG/1
20 TABLET, FILM COATED ORAL 2 TIMES DAILY
Qty: 14 TABLET | Refills: 0 | Status: SHIPPED | OUTPATIENT
Start: 2020-08-06 | End: 2020-08-17 | Stop reason: SDUPTHER

## 2020-08-06 RX ORDER — CALCIUM CARBONATE 200(500)MG
2 TABLET,CHEWABLE ORAL 3 TIMES DAILY PRN
Status: DISCONTINUED | OUTPATIENT
Start: 2020-08-06 | End: 2020-08-07 | Stop reason: HOSPADM

## 2020-08-06 RX ORDER — SODIUM CHLORIDE AND POTASSIUM CHLORIDE 150; 900 MG/100ML; MG/100ML
100 INJECTION, SOLUTION INTRAVENOUS CONTINUOUS
Status: DISCONTINUED | OUTPATIENT
Start: 2020-08-06 | End: 2020-08-07 | Stop reason: HOSPADM

## 2020-08-06 RX ADMIN — CALCIUM CARBONATE 2 TABLET: 500 TABLET, CHEWABLE ORAL at 10:41

## 2020-08-06 RX ADMIN — ONDANSETRON 4 MG: 2 INJECTION INTRAMUSCULAR; INTRAVENOUS at 08:55

## 2020-08-06 RX ADMIN — POLYETHYLENE GLYCOL 3350 17 G: 17 POWDER, FOR SOLUTION ORAL at 10:40

## 2020-08-06 RX ADMIN — ACETAMINOPHEN 500 MG: 500 TABLET, FILM COATED ORAL at 03:04

## 2020-08-06 RX ADMIN — LIDOCAINE HYDROCHLORIDE: 20 SOLUTION ORAL; TOPICAL at 16:19

## 2020-08-06 RX ADMIN — SODIUM CHLORIDE, PRESERVATIVE FREE 3 ML: 5 INJECTION INTRAVENOUS at 21:00

## 2020-08-06 RX ADMIN — LISINOPRIL 20 MG: 20 TABLET ORAL at 08:47

## 2020-08-06 RX ADMIN — SERTRALINE HYDROCHLORIDE 100 MG: 50 TABLET ORAL at 08:47

## 2020-08-06 RX ADMIN — PROMETHAZINE HYDROCHLORIDE 12.5 MG: 12.5 TABLET ORAL at 23:43

## 2020-08-06 RX ADMIN — SODIUM CHLORIDE 100 ML/HR: 9 INJECTION, SOLUTION INTRAVENOUS at 10:41

## 2020-08-06 RX ADMIN — ONDANSETRON 8 MG: 2 INJECTION INTRAMUSCULAR; INTRAVENOUS at 16:12

## 2020-08-06 RX ADMIN — HYDROCODONE BITARTRATE AND ACETAMINOPHEN 1 TABLET: 7.5; 325 TABLET ORAL at 16:12

## 2020-08-06 RX ADMIN — POTASSIUM CHLORIDE 20 MEQ: 1500 TABLET, EXTENDED RELEASE ORAL at 16:12

## 2020-08-06 RX ADMIN — Medication 1000 UNITS: at 08:47

## 2020-08-06 RX ADMIN — ENOXAPARIN SODIUM 40 MG: 40 INJECTION SUBCUTANEOUS at 08:47

## 2020-08-06 RX ADMIN — CALCIUM CARBONATE 500 MG (1,250 MG)-VITAMIN D3 200 UNIT TABLET 1 TABLET: at 08:47

## 2020-08-06 RX ADMIN — HYDROCODONE BITARTRATE AND ACETAMINOPHEN 1 TABLET: 7.5; 325 TABLET ORAL at 23:42

## 2020-08-06 RX ADMIN — SODIUM CHLORIDE AND POTASSIUM CHLORIDE 100 ML/HR: 9; 1.49 INJECTION, SOLUTION INTRAVENOUS at 23:43

## 2020-08-06 RX ADMIN — ONDANSETRON 4 MG: 2 INJECTION INTRAMUSCULAR; INTRAVENOUS at 03:04

## 2020-08-06 RX ADMIN — SODIUM CHLORIDE AND POTASSIUM CHLORIDE 100 ML/HR: 9; 1.49 INJECTION, SOLUTION INTRAVENOUS at 12:09

## 2020-08-06 RX ADMIN — FAMOTIDINE 40 MG: 20 TABLET, FILM COATED ORAL at 12:09

## 2020-08-06 NOTE — THERAPY TREATMENT NOTE
Acute Care - Physical Therapy Treatment Note  [facility]     Patient Name: Elli Edwards  : 1950  MRN: [id number]  Today's Date: 2020  Onset of Illness/Injury or Date of Surgery: 20     Referring Physician: Dr. Peoples    Admit Date: 2020    Visit Dx:    ICD-10-CM ICD-9-CM   1. Impaired mobility and ADLs Z74.09 V49.89    Z78.9    2. Arthralgia of right hip M25.551 719.45   3. Primary osteoarthritis of right hip M16.11 715.15   4. Status post total hip replacement, right Z96.641 V43.64     Patient Active Problem List   Diagnosis   • Benign essential hypertension   • Polyp of colon   • Heartburn   • Hyperlipidemia   • Phlebitis   • Vertigo   • Vitamin D deficiency   • Internal hemorrhoids   • Diverticulosis of colon   • Diarrhea   • Arthralgia of right hip   • Primary osteoarthritis of right hip   • Status post total hip replacement, right       Therapy Treatment    Rehabilitation Treatment Summary     Row Name 20 1040             Treatment Time/Intention    Discipline  physical therapy assistant  -      Document Type  therapy note (daily note)  -      Subjective Information  complains of;weakness;fatigue;nausea/vomiting  -RM      Mode of Treatment  physical therapy  -      Care Plan Review  care plan/treatment goals reviewed  -RM      Patient Effort  adequate  -RM      Existing Precautions/Restrictions  fall;right;hip, posterior  -RM      Recorded by [RM] Aime Angeles, PTA 20 1148      Row Name 20 1040             Safety Issues, Functional Mobility    Safety Issues Affecting Function (Mobility)  positioning of assistive device;sequencing abilities;safety precaution awareness  -RM      Impairments Affecting Function (Mobility)  balance;pain;strength;endurance/activity tolerance  -RM      Recorded by [RM] Aime Angeles, PTA 20 1148      Row Name 20 1040             Mobility Assessment/Intervention    Right Lower Extremity (Weight-bearing Status)   weight-bearing as tolerated (WBAT)  -RM      Recorded by [] Aime Angeles, PTA 08/06/20 1148      Row Name 08/06/20 1040             Bed Mobility Assessment/Treatment    Supine-Sit [place] (Bed Mobility)  minimum assist (75% patient effort);verbal cues  -RM      Sit-Supine [place] (Bed Mobility)  verbal cues;minimum assist (75% patient effort);2 person assist  -RM      Bed Mobility, Safety Issues  decreased use of arms for pushing/pulling;decreased use of legs for bridging/pushing  -RM      Assistive Device (Bed Mobility)  bed rails;draw sheet  -RM      Recorded by [RM] Aime Angeles, PTA 08/06/20 1148      Row Name 08/06/20 1040             Sit-Stand Transfer    Sit-Stand [place] (Transfers)  minimum assist (75% patient effort);verbal cues  -RM      Assistive Device (Sit-Stand Transfers)  walker, front-wheeled  -RM      Recorded by [] Aime Angeles, PTA 08/06/20 1148      Row Name 08/06/20 1040             Stand-Sit Transfer    Stand-Sit [place] (Transfers)  verbal cues;contact guard  -RM      Assistive Device (Stand-Sit Transfers)  walker, front-wheeled  -RM      Recorded by [] Aime Angeles, PTA 08/06/20 1148      Row Name 08/06/20 1040             Gait/Stairs Assessment/Training    [place] Level (Gait)  contact guard;minimum assist (75% patient effort);verbal cues  -RM      Assistive Device (Gait)  walker, front-wheeled  -RM      Distance in Feet (Gait)  14  -RM      Pattern (Gait)  step-to;step-through  -RM      Deviations/Abnormal Patterns (Gait)  ataxic;base of support, wide;gait speed decreased;stride length decreased  -RM      Right Sided Gait Deviations  heel strike decreased;hip hiking;weight shift ability decreased  -RM      Recorded by [RM] Aime Angeles, PTA 08/06/20 1148      Row Name 08/06/20 1040             Therapeutic Exercise    Lower Extremity (Therapeutic Exercise)  gluteal sets;gastroc stretch, bilateral;heel slides, right;quad sets, right   -RM      Exercise Type (Therapeutic Exercise)  AAROM (active assistive range of motion)  -RM      Position (Therapeutic Exercise)  supine  -RM      Sets/Reps (Therapeutic Exercise)  1x10  -RM      Recorded by [RM] Aime Angeles, PTA 08/06/20 1148      Row Name 08/06/20 1040             Static Standing Balance    Level of [place] (Supported Standing, Static Balance)  contact guard assist  -RM      Time Able to Maintain Position (Supported Standing, Static Balance)  1 to 2 minutes  -RM      Assistive Device Utilized (Supported Standing, Static Balance)  walker, rolling  -RM      Recorded by [RM] Aime Angeles, PTA 08/06/20 1148      Row Name 08/06/20 1040             Dynamic Standing Balance    Level of [place], Reaches Outside Midline (Standing, Dynamic Balance)  contact guard assist;minimal assist, 75% patient effort  -RM      Time Able to Maintain Position, Reaches Outside Midline (Standing, Dynamic Balance)  more than 5 minutes  -RM      Assistive Device Utilized (Supported Standing, Dynamic Balance)  walker, rolling  -RM      Recorded by [RM] Aime Angeles, PTA 08/06/20 1148      Row Name 08/06/20 1040             Positioning and Restraints    Pre-Treatment Position  in bed  -RM      Post Treatment Position  bed  -RM      In Bed  supine;call light within reach;encouraged to call for assist;ABD pillow;pillow between legs  -RM      Recorded by [RM] Aime Angeles, PTA 08/06/20 1148      Row Name 08/06/20 1040             Pain Scale: Numbers Pre/Post-Treatment    Pain Scale: Numbers, Pretreatment  3/10  -RM      Pain Scale: Numbers, Post-Treatment  4/10  -RM      Pain Location - Side  Right  -RM      Pain Location  hip  -RM      Pain Intervention(s)  Repositioned;Ambulation/increased activity  -RM      Recorded by [RM] Aime Angeles, PTA 08/06/20 1148      Row Name                Wound 08/04/20 0836 Right anterior hip Incision    Wound - Properties Group Date first assessed: 08/04/20  [MH] Time first assessed: 0836 [MH] Side: Right [MH] Orientation: anterior [MH] Location: hip [MH] Primary Wound Type: Incision [MH] Recorded by:  [] Valentine Cartwright RN 08/04/20 0836    Row Name 08/06/20 1040             Plan of Care Review    Plan of Care Reviewed With  patient  -RM      Progress  declining  -RM      Outcome Summary  Pt tolerates treatment fair still c/o nausea and increased pain. Pt was able to ambulate 14' cga/min a with rw and was uic and had to be returned to bed for radiology . Pt also performed supine therex. See flowsheet for details.    -RM      Recorded by [] Aime Angeles, PTA 08/06/20 1148      Row Name 08/06/20 1040             Outcome Summary/Treatment Plan (PT)    Daily Summary of Progress (PT)  progress toward functional goals is gradual  -RM      Plan for Continued Treatment (PT)  Cont PT per POC.   -RM      Recorded by [] Aime Angeles, PTA 08/06/20 1148        User Key  (r) = Recorded By, (t) = Taken By, (c) = Cosigned By    Initials Name Effective Dates Discipline     Aime Angeles, PTA 03/07/18 -  PT     Valentine Cartwright RN 03/18/20 -  Nurse          Wound 08/04/20 0836 Right anterior hip Incision (Active)   Dressing Appearance dry;intact;no drainage 8/6/2020  8:00 AM   Closure BASIM 8/5/2020  8:00 PM   [place]iwound warm;dry 8/5/2020  8:00 PM   [place] Temperature warm 8/5/2020  8:00 PM   [place] Skin Turgor soft 8/5/2020  8:00 PM   Dressing Care, Wound hydrocolloid 8/5/2020  8:00 PM           Physical Therapy Education                 Title: PT OT SLP Therapies (In Progress)     Topic: Physical Therapy (In Progress)     Point: Mobility training (Done)     Description:   Instruct learner(s) on safety and technique for assisting patient out of bed, chair or wheelchair.  Instruct in the proper use of assistive devices, such as walker, crutches, cane or brace.              Patient Friendly Description:   It's important to get you on your feet again, but we need to  do so in a way that is safe for you. Falling has serious consequences, and your personal safety is the most important thing of all.        When it's time to get out of bed, one of us or a family member will sit next to you on the bed to give you support.     If your doctor or nurse tells you to use a walker, crutches, a cane, or a brace, be sure you use it every time you get out of bed, even if you think you don't need it.    Learning Progress Summary           Patient Acceptance, E,TB,D, VU,NR by RM at 8/6/2020 1149    Acceptance, E,TB,D, VU,NR by  at 8/5/2020 1232    Comment:  HEP copy given to pt and one placed in chart, sequencing during gait and THP.    Acceptance, E,TB, VU by  at 8/4/2020 1932    Comment:  Role of PT and POC                   Point: Home exercise program (Done)     Description:   Instruct learner(s) on appropriate technique for monitoring, assisting and/or progressing patient with therapeutic exercises and activities.              Learning Progress Summary           Patient Acceptance, E,TB,D, VU,NR by RM at 8/6/2020 1149    Acceptance, E,TB,D, VU,NR by RM at 8/5/2020 1232    Comment:  HEP copy given to pt and one placed in chart, sequencing during gait and THP.                   Point: Body mechanics (Not Started)     Description:   Instruct learner(s) on proper positioning and spine alignment for patient and/or caregiver during mobility tasks and/or exercises.              Learner Progress:   Not documented in this visit.          Point: Precautions (Done)     Description:   Instruct learner(s) on prescribed precautions during mobility and gait tasks              Learning Progress Summary           Patient Acceptance, E,TB,D, VU,NR by RM at 8/6/2020 1149    Acceptance, E,TB,D, VU,NR by RM at 8/5/2020 1232    Comment:  HEP copy given to pt and one placed in chart, sequencing during gait and THP.                               User Key     Initials Effective Dates Name Provider Type  Discipline    JR 04/03/18 -  Little Hagen, PT Physical Therapist PT    RM 03/07/18 -  Aime Angeles, PTA Physical Therapy Assistant PT                PT Recommendation and Plan     Outcome Summary/Treatment Plan (PT)  Daily Summary of Progress (PT): progress toward functional goals is gradual  Plan for Continued Treatment (PT): Cont PT per POC.   Plan of Care Reviewed With: patient  Progress: declining  Outcome Summary: Pt tolerates treatment fair still c/o nausea and increased pain. Pt was able to ambulate 14' cga/min a with rw and was uic and had to be returned to bed for radiology . Pt also performed supine therex. See flowsheet for details.    Outcome Measures     Row Name 08/06/20 1040 08/05/20 0922 08/05/20 0814       How much help from another person do you currently need...    Turning from your back to your side while in flat bed without using bedrails?  2  -RM  --  3  -RM    Moving from lying on back to sitting on the side of a flat bed without bedrails?  2  -RM  --  3  -RM    Moving to and from a bed to a chair (including a wheelchair)?  3  -RM  --  3  -RM    Standing up from a chair using your arms (e.g., wheelchair, bedside chair)?  3  -RM  --  4  -RM    Climbing 3-5 steps with a railing?  2  -RM  --  2  -RM    To walk in hospital room?  2  -RM  --  3  -RM    AM-PAC 6 Clicks Score (PT)  14  -RM  --  18  -RM       How much help from another is currently needed...    Putting on and taking off regular lower body clothing?  --  2  -AH  --    Bathing (including washing, rinsing, and drying)  --  3  -AH  --    Toileting (which includes using toilet bed pan or urinal)  --  3  -AH  --    Putting on and taking off regular upper body clothing  --  4  -AH  --    Taking care of personal grooming (such as brushing teeth)  --  4  -AH  --    Eating meals  --  4  -AH  --    AM-PAC 6 Clicks Score (OT)  --  20  -AH  --       Functional Assessment    Outcome Measure Options  AM-PAC 6 Clicks Basic Mobility (PT)  -RM   AM-PAC 6 Clicks Daily Activity (OT)  -  AM-PAC 6 Clicks Basic Mobility (PT)  -RM      User Key  (r) = Recorded By, (t) = Taken By, (c) = Cosigned By    Initials Name Provider Type    Monie Noel Occupational Therapist    Aime Tovar, SWETA Physical Therapy Assistant         Time Calculation:   PT Charges     Row Name 08/06/20 1150             Time Calculation    Start Time  1040  -RM      Stop Time  1120  -RM      Time Calculation (min)  40 min  -RM      PT Received On  08/06/20  -RM      PT Goal Re-Cert Due Date  08/14/20  -RM         Time Calculation- PT    Total Timed Code Minutes- PT  40 minute(s)  -RM         Timed Charges    03156 - PT Therapeutic Exercise Minutes  18  -RM      56309 - Gait Training Minutes   15  -RM      38862 - PT Therapeutic Activity Minutes  10  -RM        User Key  (r) = Recorded By, (t) = Taken By, (c) = Cosigned By    Initials Name Provider Type    Aime Tovar, SWETA Physical Therapy Assistant        Therapy Charges for Today     Code Description Service Date Service Provider Modifiers Qty    37602006900 HC PT THER PROC EA 15 MIN 8/5/2020 Aime Angeles, PTA GP 1    71681820538 HC GAIT TRAINING EA 15 MIN 8/5/2020 Aime nAgeles, PTA GP 1    78757602103 HC PT THER PROC EA 15 MIN 8/6/2020 Aime Angeles, PTA GP 1    67673675365 HC GAIT TRAINING EA 15 MIN 8/6/2020 Aime Angeles, PTA GP 1    23385069158 HC PT THERAPEUTIC ACT EA 15 MIN 8/6/2020 Aime Angeles, PTA GP 1          PT G-Codes  Outcome Measure Options: AM-PAC 6 Clicks Basic Mobility (PT)  AM-PAC 6 Clicks Score (PT): 14  AM-PAC 6 Clicks Score (OT): 20    Aime Angeles PTA  8/6/2020

## 2020-08-06 NOTE — THERAPY TREATMENT NOTE
Acute Care - Physical Therapy Treatment Note  [facility]     Patient Name: Elli Edwards  : 1950  MRN: [id number]  Today's Date: 2020  Onset of Illness/Injury or Date of Surgery: 20     Referring Physician: Dr. Peoples    Admit Date: 2020    Visit Dx:    ICD-10-CM ICD-9-CM   1. Impaired mobility and ADLs Z74.09 V49.89    Z78.9    2. Arthralgia of right hip M25.551 719.45   3. Primary osteoarthritis of right hip M16.11 715.15   4. Status post total hip replacement, right Z96.641 V43.64     Patient Active Problem List   Diagnosis   • Benign essential hypertension   • Polyp of colon   • Heartburn   • Hyperlipidemia   • Phlebitis   • Vertigo   • Vitamin D deficiency   • Internal hemorrhoids   • Diverticulosis of colon   • Diarrhea   • Arthralgia of right hip   • Primary osteoarthritis of right hip   • Status post total hip replacement, right       Therapy Treatment    Rehabilitation Treatment Summary     Row Name 20 1511 20 1040          Treatment Time/Intention    Discipline  physical therapy assistant  -RM  physical therapy assistant  -RM     Document Type  therapy note (daily note)  -RM  therapy note (daily note)  -RM     Subjective Information  complains of;fatigue;weakness;pain;nausea/vomiting  -RM  complains of;weakness;fatigue;nausea/vomiting  -RM     Mode of Treatment  physical therapy  -RM  physical therapy  -RM     Care Plan Review  care plan/treatment goals reviewed  -RM  care plan/treatment goals reviewed  -RM     Care Plan Review, Other Participant(s)  daughter  -RM  --     Patient Effort  adequate  -RM  adequate  -RM     Existing Precautions/Restrictions  fall;right;hip, posterior  -RM  fall;right;hip, posterior  -RM     Recorded by [RM] Aime Angeles, PTA 20 1659 [RM] Aime Angeles, PTA 20 1148     Row Name 20 1511 20 1040          Safety Issues, Functional Mobility    Safety Issues Affecting Function (Mobility)  positioning of assistive  device;safety precaution awareness;sequencing abilities  -RM  positioning of assistive device;sequencing abilities;safety precaution awareness  -RM     Impairments Affecting Function (Mobility)  balance;endurance/activity tolerance;pain;strength  -RM  balance;pain;strength;endurance/activity tolerance  -RM     Recorded by [] Aime Angeles, PTA 08/06/20 1659 [] Aime Angeles, PTA 08/06/20 1148     Row Name 08/06/20 1511 08/06/20 1040          Mobility Assessment/Intervention    Right Lower Extremity (Weight-bearing Status)  weight-bearing as tolerated (WBAT)  -RM  weight-bearing as tolerated (WBAT)  -RM     Recorded by [] Aime Angeles, PTA 08/06/20 1659 [] Aime Angeles, PTA 08/06/20 1148     Row Name 08/06/20 1511 08/06/20 1040          Bed Mobility Assessment/Treatment    Supine-Sit [place] (Bed Mobility)  minimum assist (75% patient effort);verbal cues  -RM  minimum assist (75% patient effort);verbal cues  -RM     Sit-Supine [place] (Bed Mobility)  --  verbal cues;minimum assist (75% patient effort);2 person assist  -RM     Bed Mobility, Safety Issues  decreased use of arms for pushing/pulling;decreased use of legs for bridging/pushing;impaired trunk control for bed mobility  -RM  decreased use of arms for pushing/pulling;decreased use of legs for bridging/pushing  -RM     Assistive Device (Bed Mobility)  bed rails;head of bed elevated  -RM  bed rails;draw sheet  -RM     Recorded by [] Aime Angeles, PTA 08/06/20 1659 [] Aime Angeles, PTA 08/06/20 1148     Row Name 08/06/20 1511 08/06/20 1040          Sit-Stand Transfer    Sit-Stand [place] (Transfers)  minimum assist (75% patient effort);verbal cues  -RM  minimum assist (75% patient effort);verbal cues  -RM     Assistive Device (Sit-Stand Transfers)  walker, front-wheeled  -RM  walker, front-wheeled  -RM     Recorded by [RM] Aime Angeles, PTA 08/06/20 1659 [] Aime Angeles, PTA 08/06/20 1148     Row  Name 08/06/20 1511 08/06/20 1040          Stand-Sit Transfer    Stand-Sit [place] (Transfers)  minimum assist (75% patient effort);verbal cues  -RM  verbal cues;contact guard  -RM     Assistive Device (Stand-Sit Transfers)  walker, front-wheeled  -RM  walker, front-wheeled  -RM     Recorded by [RM] Aime Angeles, PTA 08/06/20 1659 [RM] Aime Angeles, PTA 08/06/20 1148     Row Name 08/06/20 1511 08/06/20 1040          Gait/Stairs Assessment/Training    [place] Level (Gait)  contact guard;verbal cues  -RM  contact guard;minimum assist (75% patient effort);verbal cues  -RM     Assistive Device (Gait)  walker, front-wheeled  -RM  walker, front-wheeled  -RM     Distance in Feet (Gait)  90  -RM  14  -RM     Pattern (Gait)  step-to;step-through  -RM  step-to;step-through  -RM     Deviations/Abnormal Patterns (Gait)  antalgic;base of support, wide;gait speed decreased  -RM  ataxic;base of support, wide;gait speed decreased;stride length decreased  -RM     Bilateral Gait Deviations  forward flexed posture  -RM  --     Right Sided Gait Deviations  heel strike decreased;weight shift ability decreased  -RM  heel strike decreased;hip hiking;weight shift ability decreased  -RM     Recorded by [RM] Aime Angeles, PTA 08/06/20 1659 [RM] Aime Angeles, PTA 08/06/20 1148     Row Name 08/06/20 1040             Therapeutic Exercise    Lower Extremity (Therapeutic Exercise)  gluteal sets;gastroc stretch, bilateral;heel slides, right;quad sets, right  -RM      Exercise Type (Therapeutic Exercise)  AAROM (active assistive range of motion)  -RM      Position (Therapeutic Exercise)  supine  -RM      Sets/Reps (Therapeutic Exercise)  1x10  -RM      Recorded by [RM] Aime Angeles, PTA 08/06/20 1148      Row Name 08/06/20 1040             Static Standing Balance    Level of [place] (Supported Standing, Static Balance)  contact guard assist  -RM      Time Able to Maintain Position (Supported Standing, Static  Balance)  1 to 2 minutes  -RM      Assistive Device Utilized (Supported Standing, Static Balance)  walker, rolling  -RM      Recorded by [] Aime Angeles, PTA 08/06/20 1148      Row Name 08/06/20 1040             Dynamic Standing Balance    Level of [place], Reaches Outside Midline (Standing, Dynamic Balance)  contact guard assist;minimal assist, 75% patient effort  -RM      Time Able to Maintain Position, Reaches Outside Midline (Standing, Dynamic Balance)  more than 5 minutes  -RM      Assistive Device Utilized (Supported Standing, Dynamic Balance)  walker, rolling  -RM      Recorded by [RM] Aime Angeles, PTA 08/06/20 1148      Row Name 08/06/20 1511 08/06/20 1040          Positioning and Restraints    Pre-Treatment Position  in bed  -RM  in bed  -RM     Post Treatment Position  chair  -RM  bed  -RM     In Bed  --  supine;call light within reach;encouraged to call for assist;ABD pillow;pillow between legs  -RM     In Chair  reclined;call light within reach;encouraged to call for assist;with family/caregiver;pillow between legs;notified nsg  -RM  --     Recorded by [RM] Aime Angeles, PTA 08/06/20 1659 [RM] Aime Angeles, PTA 08/06/20 1148     Row Name 08/06/20 1511 08/06/20 1040          Pain Scale: Numbers Pre/Post-Treatment    Pain Scale: Numbers, Pretreatment  3/10  -RM  3/10  -RM     Pain Scale: Numbers, Post-Treatment  5/10  -RM  4/10  -RM     Pain Location - Side  Right  -RM  Right  -RM     Pain Location  hip  -RM  hip  -RM     Pain Intervention(s)  Repositioned;Ambulation/increased activity  -RM  Repositioned;Ambulation/increased activity  -RM     Recorded by [RM] Aime Angeles, PTA 08/06/20 1659 [] Aime Angeles, PTA 08/06/20 1148     Row Name                Wound 08/04/20 0836 Right anterior hip Incision    Wound - Properties Group Date first assessed: 08/04/20 [MH] Time first assessed: 0836 [MH] Side: Right [MH] Orientation: anterior [MH] Location: hip [MH] Primary Wound  Type: Incision [] Recorded by:  [] Valentine Cartwright RN 08/04/20 0836    Row Name 08/06/20 1511 08/06/20 1040          Plan of Care Review    Plan of Care Reviewed With  patient  -RM  patient  -RM     Progress  improving  -RM  declining  -RM     Outcome Summary  Pt reports feeling better than in the AM. Pt was able to tolerate ambulation 90' cga with rw. Pt was left uic reclined with call light at hand.    -RM  Pt tolerates treatment fair still c/o nausea and increased pain. Pt was able to ambulate 14' cga/min a with rw and was uic and had to be returned to bed for radiology . Pt also performed supine therex. See flowsheet for details.    -RM     Recorded by [] Aime Angeles, PTA 08/06/20 1659 [] Aime Angeles, PTA 08/06/20 1148     Row Name 08/06/20 1511 08/06/20 1040          Outcome Summary/Treatment Plan (PT)    Daily Summary of Progress (PT)  progress toward functional goals is good  -RM  progress toward functional goals is gradual  -RM     Plan for Continued Treatment (PT)  Cont Pt per POC.   -RM  Cont PT per POC.   -RM     Recorded by [] Aime Angeles, PTA 08/06/20 1659 [] Aime Angeles, PTA 08/06/20 1148       User Key  (r) = Recorded By, (t) = Taken By, (c) = Cosigned By    Initials Name Effective Dates Discipline     Aime Angeles, PTA 03/07/18 -  PT     Valentine Cartwright RN 03/18/20 -  Nurse          Wound 08/04/20 0836 Right anterior hip Incision (Active)   Dressing Appearance dry;intact;no drainage 8/6/2020 10:00 AM   Closure BASIM 8/5/2020  8:00 PM   Periwound warm;dry 8/5/2020  8:00 PM   Periwound Temperature warm 8/5/2020  8:00 PM   Periwound Skin Turgor soft 8/5/2020  8:00 PM   Dressing Care, Wound hydrocolloid 8/5/2020  8:00 PM           Physical Therapy Education                 Title: PT OT SLP Therapies (In Progress)     Topic: Physical Therapy (In Progress)     Point: Mobility training (Done)     Description:   Instruct learner(s) on safety and technique for  assisting patient out of bed, chair or wheelchair.  Instruct in the proper use of assistive devices, such as walker, crutches, cane or brace.              Patient Friendly Description:   It's important to get you on your feet again, but we need to do so in a way that is safe for you. Falling has serious consequences, and your personal safety is the most important thing of all.        When it's time to get out of bed, one of us or a family member will sit next to you on the bed to give you support.     If your doctor or nurse tells you to use a walker, crutches, a cane, or a brace, be sure you use it every time you get out of bed, even if you think you don't need it.    Learning Progress Summary           Patient Acceptance, E,TB,D, VU,NR by  at 8/6/2020 1659    Acceptance, E,TB,D, VU,NR by  at 8/6/2020 1149    Acceptance, E,TB,D, VU,NR by  at 8/5/2020 1232    Comment:  HEP copy given to pt and one placed in chart, sequencing during gait and THP.    Acceptance, E,TB, VU by  at 8/4/2020 1932    Comment:  Role of PT and POC                   Point: Home exercise program (Done)     Description:   Instruct learner(s) on appropriate technique for monitoring, assisting and/or progressing patient with therapeutic exercises and activities.              Learning Progress Summary           Patient Acceptance, E,TB,D, VU,NR by  at 8/6/2020 1149    Acceptance, E,TB,D, VU,NR by  at 8/5/2020 1232    Comment:  HEP copy given to pt and one placed in chart, sequencing during gait and THP.                   Point: Body mechanics (Not Started)     Description:   Instruct learner(s) on proper positioning and spine alignment for patient and/or caregiver during mobility tasks and/or exercises.              Learner Progress:   Not documented in this visit.          Point: Precautions (Done)     Description:   Instruct learner(s) on prescribed precautions during mobility and gait tasks              Learning Progress Summary            Patient Acceptance, E,TB,D, VU,NR by  at 8/6/2020 1149    Acceptance, E,TB,D, VU,NR by  at 8/5/2020 1232    Comment:  HEP copy given to pt and one placed in chart, sequencing during gait and THP.                               User Key     Initials Effective Dates Name Provider Type Discipline     04/03/18 -  Little Hagen, PT Physical Therapist PT     03/07/18 -  Aime Angeles, PTA Physical Therapy Assistant PT                PT Recommendation and Plan     Outcome Summary/Treatment Plan (PT)  Daily Summary of Progress (PT): progress toward functional goals is good  Plan for Continued Treatment (PT): Cont Pt per POC.   Plan of Care Reviewed With: patient  Progress: improving  Outcome Summary: Pt reports feeling better than in the AM. Pt was able to tolerate ambulation 90' cga with rw. Pt was left uic reclined with call light at hand.    Outcome Measures     Row Name 08/06/20 1511 08/06/20 1040 08/05/20 0922       How much help from another person do you currently need...    Turning from your back to your side while in flat bed without using bedrails?  2  -RM  2  -RM  --    Moving from lying on back to sitting on the side of a flat bed without bedrails?  2  -RM  2  -RM  --    Moving to and from a bed to a chair (including a wheelchair)?  3  -RM  3  -RM  --    Standing up from a chair using your arms (e.g., wheelchair, bedside chair)?  3  -RM  3  -RM  --    Climbing 3-5 steps with a railing?  2  -RM  2  -RM  --    To walk in hospital room?  3  -RM  2  -RM  --    AM-PAC 6 Clicks Score (PT)  15  -RM  14  -RM  --       How much help from another is currently needed...    Putting on and taking off regular lower body clothing?  --  --  2  -AH    Bathing (including washing, rinsing, and drying)  --  --  3  -AH    Toileting (which includes using toilet bed pan or urinal)  --  --  3  -AH    Putting on and taking off regular upper body clothing  --  --  4  -AH    Taking care of personal grooming (such as  brushing teeth)  --  --  4  -    Eating meals  --  --  4  -AH    AM-PAC 6 Clicks Score (OT)  --  --  20  -       Functional Assessment    Outcome Measure Options  AM-PAC 6 Clicks Basic Mobility (PT)  -RM  AM-PAC 6 Clicks Basic Mobility (PT)  -RM  AM-[facility] 6 Clicks Daily Activity (OT)  -    Row Name 08/05/20 0814             How much help from another person do you currently need...    Turning from your back to your side while in flat bed without using bedrails?  3  -RM      Moving from lying on back to sitting on the side of a flat bed without bedrails?  3  -RM      Moving to and from a bed to a chair (including a wheelchair)?  3  -RM      Standing up from a chair using your arms (e.g., wheelchair, bedside chair)?  4  -RM      Climbing 3-5 steps with a railing?  2  -RM      To walk in hospital room?  3  -RM      AM-PAC 6 Clicks Score (PT)  18  -RM         Functional Assessment    Outcome Measure Options  AM-[facility] 6 Clicks Basic Mobility (PT)  -        User Key  (r) = Recorded By, (t) = Taken By, (c) = Cosigned By    Initials Name Provider Type     Monie Ryan Occupational Therapist    Aime Tovar, SWETA Physical Therapy Assistant         Time Calculation:   PT Charges     Row Name 08/06/20 1700 08/06/20 1150          Time Calculation    Start Time  1511  -RM  1040  -RM     Stop Time  1537  -RM  1120  -RM     Time Calculation (min)  26 min  -RM  40 min  -RM     PT Received On  08/06/20  -RM  08/06/20  -RM     PT Goal Re-Cert Due Date  08/14/20  -RM  08/14/20  -RM        Time Calculation- PT    Total Timed Code Minutes- PT  26 minute(s)  -RM  40 minute(s)  -RM        Timed Charges    88345 - PT Therapeutic Exercise Minutes  --  18  -RM     61244 - Gait Training Minutes   16  -RM  15  -RM     41086 - PT Therapeutic Activity Minutes  10  -RM  10  -RM       User Key  (r) = Recorded By, (t) = Taken By, (c) = Cosigned By    Initials Name Provider Type    Aime Tovar, SWETA Physical Therapy Assistant         Therapy Charges for Today     Code Description Service Date Service Provider Modifiers Qty    45069628327 HC PT THER PROC EA 15 MIN 8/5/2020 Aime Angeles, PTA GP 1    72627512775 HC GAIT TRAINING EA 15 MIN 8/5/2020 Aime Angeles, PTA GP 1    29696811845 HC PT THER PROC EA 15 MIN 8/6/2020 Aime Angeles, PTA GP 1    39956827159 HC GAIT TRAINING EA 15 MIN 8/6/2020 Aime Angeles, PTA GP 1    33137349116 HC PT THERAPEUTIC ACT EA 15 MIN 8/6/2020 Aime Angeles, PTA GP 1    64956251871 HC GAIT TRAINING EA 15 MIN 8/6/2020 Aime Angeles, PTA GP 1    67144987690 HC PT THERAPEUTIC ACT EA 15 MIN 8/6/2020 Aime Angeles, PTA GP 1          PT G-Codes  Outcome Measure Options: AM-PAC 6 Clicks Basic Mobility (PT)  AM-PAC 6 Clicks Score (PT): 15  AM-PAC 6 Clicks Score (OT): 20    Aime Angeles PTA  8/6/2020

## 2020-08-06 NOTE — PLAN OF CARE
Problem: Patient Care Overview  Goal: Plan of Care Review  Outcome: Ongoing (interventions implemented as appropriate)  Flowsheets (Taken 8/6/2020 8557)  Progress: improving  Outcome Summary: Patient still experiencing nausea, especially with movement, treated per MAR.  Blood pressure dropped to 100/46 though pt was asymptomatic.  Order for IV fluids received with improvement in BP.

## 2020-08-06 NOTE — PROGRESS NOTES
Continued Stay Note   [facility]to[facility]     Patient Name: Elli Edwards  MRN: [id number]  Today's Date: 8/6/2020    Admit Date: 8/4/2020    Discharge Plan     Row Name 08/06/20 1326       Plan    Plan  Called to [facility]  and referral faxed.  Informed likely discharge tomorrow.  Will call back if can accept.          Discharge Codes    No documentation.       Expected Discharge Date and Time     Expected Discharge Date Expected Discharge Time    Aug 6, 2020             Jennifer Waterman RN

## 2020-08-06 NOTE — DISCHARGE SUMMARY
Name:  Elli Edwards   Age:  69 y.o.  Sex:  female  :  1950  MRN:  [id number]   Visit Number:  [id number]  Primary Care Physician:  Elli Trinh MD  Date of Discharge:  2020  Admission Date:  2020  6:32 AM      Discharge Diagnosis:       Patient Active Problem List   Diagnosis   • Benign essential hypertension   • Polyp of colon   • Heartburn   • Hyperlipidemia   • Phlebitis   • Vertigo   • Vitamin D deficiency   • Internal hemorrhoids   • Diverticulosis of colon   • Diarrhea   • Arthralgia of right hip   • Primary osteoarthritis of right hip   • Status post total hip replacement, right       Presenting Problem/History of Present Illness:    Arthralgia of right hip [M25.551]  Primary osteoarthritis of right hip [M16.11]  Status post total hip replacement, right [Z96.641]  Arthralgia of right hip [M25.551]       Consults:     Consults     Date and Time Order Name Status Description    2020 1228 Inpatient Consult to Hospitalist Completed           Procedures Performed:    Procedure(s):  Total hip arthroplasty RIGHT (BIOMET)         History of presenting illness:    Patient is postop right total hip arthroplasty.  Patient tolerated her surgical procedure well.  Patient has had an uneventful hospital stay thus far.  She states she is having discomfort to the right hip and the pain medication is making her feel nauseated.  She denies chest pain or shortness of breath.  She denies abdominal pain and states she is passing flatus but has not had a bowel movement in 3 days.  The hospital medicine doctor did order an x-ray of the abdomen to rule out ileus/small bowel obstruction-this revealed nonspecific bowel gas pattern without obstruction.      Vital Signs:    Temp:  [97.4 °F (36.3 °C)-99.8 °F (37.7 °C)] 97.4 °F (36.3 °C)  Heart Rate:  [84-95] 84  Resp:  [16-19] 16  BP: ()/(39-56) 121/54    Physical Exam:     General Appearance: alert, appears stated age and cooperative  Head:  atraumatic  Eyes: lids and lashes normal  Nose: no drainage  Throat: oral mucosa moist  Lungs: respirations regular, respirations even and respirations unlabored  Heart: regular rhythm & normal rate  Abdomen: normal bowel sounds, soft non-tender and no guarding  Extremities: moves extremities well, no redness and Markie's sign negative  Pulses: Pulses palpable and equal bilaterally  Skin: turgor normal  Neurologic: Mental Status orientated to person, place, time and situation  Psych: normal      Pertinent Lab Results:     Lab Results (all)     Procedure Component Value Units Date/Time    Magnesium [[id number]]  (Normal) Collected:  08/06/20 1015    Specimen:  Blood Updated:  08/06/20 1112     Magnesium 1.9 mg/dL     Basic Metabolic Panel [[id number]]  (Abnormal) Collected:  08/06/20 1015    Specimen:  Blood Updated:  08/06/20 1036     Glucose 117 mg/dL      BUN 16 mg/dL      Creatinine 0.85 mg/dL      Sodium 138 mmol/L      Potassium 3.3 mmol/L      Chloride 97 mmol/L      CO2 33.6 mmol/L      Calcium 9.2 mg/dL      eGFR Non African Amer 66 mL/min/1.73      BUN/Creatinine Ratio 18.8     Anion Gap 7.4 mmol/L     Narrative:       GFR Normal >60  Chronic Kidney Disease <60  Kidney Failure <15      CBC & Differential [[id number]] Collected:  08/06/20 1015    Specimen:  Blood Updated:  08/06/20 1022    Narrative:       The following orders were created for panel order CBC & Differential.  Procedure                               Abnormality         Status                     ---------                               -----------         ------                     CBC Auto Differential[[id number]]        Abnormal            Final result                 Please view results for these tests on the individual orders.    CBC Auto Differential [[id number]]  (Abnormal) Collected:  08/06/20 1015    Specimen:  Blood Updated:  08/06/20 1022     WBC 14.08 10*3/mm3      RBC 3.51 10*6/mm3      Hemoglobin 10.6 g/dL      Hematocrit 32.3 %       MCV 92.0 fL      MCH 30.2 pg      MCHC 32.8 g/dL      RDW 13.1 %      RDW-SD 43.8 fl      MPV 10.9 fL      Platelets 196 10*3/mm3      Neutrophil % 78.0 %      Lymphocyte % 8.7 %      Monocyte % 12.5 %      Eosinophil % 0.1 %      Basophil % 0.4 %      Immature Grans % 0.3 %      Neutrophils, Absolute 11.00 10*3/mm3      Lymphocytes, Absolute 1.22 10*3/mm3      Monocytes, Absolute 1.76 10*3/mm3      Eosinophils, Absolute 0.01 10*3/mm3      Basophils, Absolute 0.05 10*3/mm3      Immature Grans, Absolute 0.04 10*3/mm3      nRBC 0.0 /100 WBC     Basic Metabolic Panel [[id number]]  (Abnormal) Collected:  08/05/20 0547    Specimen:  Blood Updated:  08/05/20 0633     Glucose 140 mg/dL      BUN 13 mg/dL      Creatinine 0.65 mg/dL      Sodium 138 mmol/L      Potassium 3.5 mmol/L      Chloride 96 mmol/L      CO2 31.4 mmol/L      Calcium 8.9 mg/dL      eGFR Non African Amer 90 mL/min/1.73      BUN/Creatinine Ratio 20.0     Anion Gap 10.6 mmol/L     Narrative:       GFR Normal >60  Chronic Kidney Disease <60  Kidney Failure <15      CBC & Differential [[id number]] Collected:  08/05/20 0547    Specimen:  Blood Updated:  08/05/20 0620    Narrative:       The following orders were created for panel order CBC & Differential.  Procedure                               Abnormality         Status                     ---------                               -----------         ------                     CBC Auto Differential[[id number]]        Abnormal            Final result                 Please view results for these tests on the individual orders.    CBC Auto Differential [[id number]]  (Abnormal) Collected:  08/05/20 0547    Specimen:  Blood Updated:  08/05/20 0620     WBC 14.77 10*3/mm3      RBC 3.99 10*6/mm3      Hemoglobin 12.0 g/dL      Hematocrit 36.5 %      MCV 91.5 fL      MCH 30.1 pg      MCHC 32.9 g/dL      RDW 12.8 %      RDW-SD 43.1 fl      MPV 11.5 fL      Platelets 228 10*3/mm3      Neutrophil % 76.5 %      Lymphocyte %  10.2 %      Monocyte % 12.5 %      Eosinophil % 0.0 %      Basophil % 0.1 %      Immature Grans % 0.7 %      Neutrophils, Absolute 11.30 10*3/mm3      Lymphocytes, Absolute 1.50 10*3/mm3      Monocytes, Absolute 1.85 10*3/mm3      Eosinophils, Absolute 0.00 10*3/mm3      Basophils, Absolute 0.02 10*3/mm3      Immature Grans, Absolute 0.10 10*3/mm3      nRBC 0.0 /100 WBC     Tissue Pathology Exam [[id number]] Collected:  08/04/20 0904    Specimen:  Bone from Hip, Right Updated:  08/04/20 1342          Pertinent Radiology Results:    Imaging Results (All)     Procedure Component Value Units Date/Time    XR Abdomen 1 View [[id number]] Collected:  08/06/20 1149     Updated:  08/06/20 1151    Narrative:       PROCEDURE: XR ABDOMEN 1 VW-     HISTORY: distension; Z74.09-Other reduced mobility; Z78.9-Other  specified health status; M25.551-Pain in right hip; M16.11-Unilateral  primary osteoarthritis, right hip; Z96.641-Presence of right artificial  hip joint     COMPARISON: None.     FINDINGS: An AP view of the abdomen and pelvis demonstrates a  nonspecific, nonobstructive bowel gas pattern. The patient is status  post cholecystectomy. The patient is undergone right hip arthroplasty.       Impression:       Nonspecific, nonobstructive bowel gas pattern.           This report was finalized on 8/6/2020 11:49 AM by Sonya Arias M.D..    [facility] OR Procedure [[id number]] Resulted:  08/04/20 2105     Updated:  08/04/20 2105    XR Pelvis 1 or 2 View [[id number]] Collected:  08/04/20 1136     Updated:  08/04/20 1138    Narrative:       PROCEDURE: XR PELVIS 1 OR 2 VW-     HISTORY: Post-op R THR; M25.551-Pain in right hip; M16.11-Unilateral  primary osteoarthritis, right hip     COMPARISON: None.     FINDINGS: The patient is status post total right hip arthroplasty. There  are no hardware complications or fractures. The pubic symphysis and SI  joints are intact. Soft tissue gas is seen in the right hip.       Impression:        Status post total right hip arthroplasty with no hardware  complications.     This report was finalized on 8/4/2020 11:36 AM by Sonya Arias M.D..          Condition on Discharge:      stable      Discharge Disposition:    Home-Health Care [facility]    Discharge Medication:       Discharge Medications      New Medications      Instructions Start Date   docusate sodium 250 MG capsule  Commonly known as:  COLACE   250 mg, Oral, Daily      enoxaparin 40 MG/0.4ML solution syringe  Commonly known as:  Lovenox   40 mg, Subcutaneous, Every 24 Hours Scheduled      famotidine 20 MG tablet  Commonly known as:  Pepcid   20 mg, Oral, 2 Times Daily      HYDROcodone-acetaminophen 7.5-325 MG per tablet  Commonly known as:  NORCO   1 tablet, Oral, Every 6 Hours PRN      ondansetron ODT 8 MG disintegrating tablet  Commonly known as:  Zofran ODT   8 mg, Translingual, Every 8 Hours PRN      promethazine 12.5 MG tablet  Commonly known as:  PHENERGAN   12.5 mg, Oral, Every 12 Hours PRN         Continue These Medications      Instructions Start Date   atorvastatin 40 MG tablet  Commonly known as:  LIPITOR   40 mg, Oral, Daily      calcium carbonate 600 MG tablet  Commonly known as:  OS-ZEESHAN   600 mg, Oral, Daily      cholecalciferol 25 MCG (1000 UT) tablet  Commonly known as:  VITAMIN D3   1,000 Units, Oral, Daily      lisinopril 20 MG tablet  Commonly known as:  PRINIVIL,ZESTRIL   20 mg, Oral, Daily      sertraline 100 MG tablet  Commonly known as:  ZOLOFT   100 mg, Oral, Daily         Stop These Medications    chlorthalidone 25 MG tablet  Commonly known as:  HYGROTON            Discharge Diet:     Diet Instructions     Advance Diet as Tolerated            Activity at Discharge:     Activity Instructions     Change Dressing      Change dressing once daily starting 7th post-operative day, and keep a clean dry dressing in place.    Patient May Shower; No Tub Baths, Pools or Hot Tubs      May change dressing routinely, keep a clean dry  dressing in place.    Weight Bearing As Tolerated      Protected weight bearing as tolerated, with cane, crutches or walker as appropriate for condition and safety.          Follow-up Appointments:    Future Appointments   Date Time Provider Department Center   8/17/2020  9:00 AM Oliverio Brock PA-C [facility]     Additional Instructions for the Follow-ups that You Need to Schedule     Ambulatory Referral to Home Health   As directed      Face to Face Visit Date:  8/6/2020    Follow-up provider for Plan of Care?:  I will be treating the patient on an ongoing basis.  Please send me the Plan of Care for signature.    Follow-up provider:  SHARON PEOPLES [[id number]]    Reason/Clinical Findings:  s/p right richard    Describe mobility limitations that make leaving home difficult:  decreased mobility    Nursing/Therapeutic Services Requested:  Physical Therapy Occupational Therapy    PT orders:  Total joint pathway Gait Training Transfer training Therapeutic exercise    Weight Bearing Status:  As Tolerated    Frequency:  1 Week 1         Apply Ice to Affected Extremity Every 2 Hours   As directed      Only 2 hours maximum at a time, only three to four times every 24 hours.    Order Comments:  Only 2 hours maximum at a time, only three to four times every 24 hours.          Discharge Follow-up with PCP   As directed       Currently Documented PCP:    Elli Trinh MD    PCP Phone Number:    [phone]     Follow Up Details:  with patient primary care physician in 1 month as appropriate.         Discharge Follow-up with Specified Provider: Juan Peoples MD   As directed      To:  Juan Peoples MD    Follow Up Details:  at scheduled appointment - check on [facility] patient appointments for date and time.               Test Results Pending at Discharge:     Order Current Status    Tissue Pathology Exam In process        Drink plenty of liquids as well as apple/prune juice.  If you have not had a bowel movement in the  next 24 hours please report to the emergency room or if you develop abdominal pain/inability to pass gas please report to the nearest emergency room     Oliverio Brock PA-C  08/06/20  12:25    Time: Discharge 15 min  The patient's nurse called at 1300 stating the patient did not feel comfortable going home due to her nausea and lack of bowel movement.  Patient is agreeable to stay overnight for additional observation.  We will increase her Zofran to 8 mg every 6 hours.  We will add a GI cocktail and Pepcid x1.  I will also add Phenergan 12.5 mg tablet every 8 hours for her nausea.  Reexamine in the morning plan to discharge home tomorrow

## 2020-08-06 NOTE — PLAN OF CARE
Problem: Patient Care Overview  Goal: Plan of Care Review  8/6/2020 1659 by Aime Angeles, PTA  Flowsheets (Taken 8/6/2020 1511)  Outcome Summary: Pt reports feeling better than in the AM. Pt was able to tolerate ambulation 90' cga with rw. Pt was left uic reclined with call light at hand.

## 2020-08-06 NOTE — THERAPY TREATMENT NOTE
Pt declined to work with OT at this time stating she had just gotten comfortable.  Will follow up with pt tomorrow.

## 2020-08-06 NOTE — PLAN OF CARE
Problem: Patient Care Overview  Goal: Plan of Care Review  Flowsheets (Taken 8/6/2020 1040)  Outcome Summary: Pt tolerates treatment fair still c/o nausea and increased pain. Pt was able to ambulate 14' cga/min a with rw and was uic and had to be returned to bed for radiology . Pt also performed supine therex. See flowsheet for details.

## 2020-08-06 NOTE — PROGRESS NOTES
Continued Stay Note   Antoine     Patient Name: Elli Edwards  MRN: [id number]  Today's Date: 8/6/2020    Admit Date: 8/4/2020    Discharge Plan     Row Name 08/06/20 2529       Plan    Plan Comments   Pt prefers [facility]  to deliver the BSC to the room     Row Name 08/06/20 1326       Plan    Plan  Called to [facility]  and referral faxed.  Informed likely discharge tomorrow.  Will call back if can accept.          Discharge Codes    No documentation.       Expected Discharge Date and Time     Expected Discharge Date Expected Discharge Time    Aug 6, 2020             Nan Espinoza RN

## 2020-08-07 VITALS
WEIGHT: 219.14 LBS | OXYGEN SATURATION: 96 % | TEMPERATURE: 98.5 F | SYSTOLIC BLOOD PRESSURE: 122 MMHG | HEART RATE: 92 BPM | RESPIRATION RATE: 18 BRPM | DIASTOLIC BLOOD PRESSURE: 52 MMHG | BODY MASS INDEX: 41.37 KG/M2 | HEIGHT: 61 IN

## 2020-08-07 LAB
DEPRECATED RDW RBC AUTO: 44.8 FL (ref 37–54)
ERYTHROCYTE [DISTWIDTH] IN BLOOD BY AUTOMATED COUNT: 13.1 % (ref 12.3–15.4)
HCT VFR BLD AUTO: 29.1 % (ref 34–46.6)
HGB BLD-MCNC: 9.5 G/DL (ref 12–15.9)
LAB AP CASE REPORT: NORMAL
MCH RBC QN AUTO: 30.4 PG (ref 26.6–33)
MCHC RBC AUTO-ENTMCNC: 32.6 G/DL (ref 31.5–35.7)
MCV RBC AUTO: 93.3 FL (ref 79–97)
PATH REPORT.FINAL DX SPEC: NORMAL
PLATELET # BLD AUTO: 177 10*3/MM3 (ref 140–450)
PMV BLD AUTO: 11.5 FL (ref 6–12)
RBC # BLD AUTO: 3.12 10*6/MM3 (ref 3.77–5.28)
WBC # BLD AUTO: 11.08 10*3/MM3 (ref 3.4–10.8)

## 2020-08-07 PROCEDURE — A9270 NON-COVERED ITEM OR SERVICE: HCPCS | Performed by: ORTHOPAEDIC SURGERY

## 2020-08-07 PROCEDURE — 63710000001 SERTRALINE 50 MG TABLET: Performed by: ORTHOPAEDIC SURGERY

## 2020-08-07 PROCEDURE — A9270 NON-COVERED ITEM OR SERVICE: HCPCS | Performed by: PHYSICIAN ASSISTANT

## 2020-08-07 PROCEDURE — 63710000001 HYDROCODONE-ACETAMINOPHEN 7.5-325 MG TABLET: Performed by: ORTHOPAEDIC SURGERY

## 2020-08-07 PROCEDURE — 99224 PR SBSQ OBSERVATION CARE/DAY 15 MINUTES: CPT | Performed by: FAMILY MEDICINE

## 2020-08-07 PROCEDURE — 63710000001 ATORVASTATIN 40 MG TABLET: Performed by: ORTHOPAEDIC SURGERY

## 2020-08-07 PROCEDURE — 63710000001 LISINOPRIL 20 MG TABLET: Performed by: ORTHOPAEDIC SURGERY

## 2020-08-07 PROCEDURE — 63710000001 CALCIUM 500 MG VITAMIN D 5 MCG (200 UT) 500-5 MG-MCG TABLET: Performed by: ORTHOPAEDIC SURGERY

## 2020-08-07 PROCEDURE — 99024 POSTOP FOLLOW-UP VISIT: CPT | Performed by: ORTHOPAEDIC SURGERY

## 2020-08-07 PROCEDURE — 25010000002 ENOXAPARIN PER 10 MG: Performed by: ORTHOPAEDIC SURGERY

## 2020-08-07 PROCEDURE — 97530 THERAPEUTIC ACTIVITIES: CPT

## 2020-08-07 PROCEDURE — 97116 GAIT TRAINING THERAPY: CPT

## 2020-08-07 PROCEDURE — 97535 SELF CARE MNGMENT TRAINING: CPT

## 2020-08-07 PROCEDURE — 63710000001 POTASSIUM CHLORIDE 20 MEQ TABLET CONTROLLED-RELEASE: Performed by: PHYSICIAN ASSISTANT

## 2020-08-07 PROCEDURE — 63710000001 CALCIUM CARBONATE 500 MG CHEWABLE TABLET: Performed by: FAMILY MEDICINE

## 2020-08-07 PROCEDURE — G0378 HOSPITAL OBSERVATION PER HR: HCPCS

## 2020-08-07 PROCEDURE — A9270 NON-COVERED ITEM OR SERVICE: HCPCS | Performed by: FAMILY MEDICINE

## 2020-08-07 PROCEDURE — 97110 THERAPEUTIC EXERCISES: CPT

## 2020-08-07 PROCEDURE — 63710000001 CHOLECALCIFEROL 25 MCG (1000 UT) TABLET: Performed by: ORTHOPAEDIC SURGERY

## 2020-08-07 PROCEDURE — 85027 COMPLETE CBC AUTOMATED: CPT | Performed by: FAMILY MEDICINE

## 2020-08-07 RX ADMIN — HYDROCODONE BITARTRATE AND ACETAMINOPHEN 1 TABLET: 7.5; 325 TABLET ORAL at 08:56

## 2020-08-07 RX ADMIN — POTASSIUM CHLORIDE 20 MEQ: 1500 TABLET, EXTENDED RELEASE ORAL at 08:56

## 2020-08-07 RX ADMIN — ENOXAPARIN SODIUM 40 MG: 40 INJECTION SUBCUTANEOUS at 08:55

## 2020-08-07 RX ADMIN — Medication 1000 UNITS: at 08:56

## 2020-08-07 RX ADMIN — SERTRALINE HYDROCHLORIDE 100 MG: 50 TABLET ORAL at 08:56

## 2020-08-07 RX ADMIN — CALCIUM CARBONATE 2 TABLET: 500 TABLET, CHEWABLE ORAL at 09:03

## 2020-08-07 RX ADMIN — CALCIUM CARBONATE 500 MG (1,250 MG)-VITAMIN D3 200 UNIT TABLET 1 TABLET: at 08:55

## 2020-08-07 RX ADMIN — LISINOPRIL 20 MG: 20 TABLET ORAL at 08:56

## 2020-08-07 RX ADMIN — ATORVASTATIN CALCIUM 40 MG: 40 TABLET, FILM COATED ORAL at 08:56

## 2020-08-07 NOTE — THERAPY TREATMENT NOTE
Acute Care - Occupational Therapy Treatment Note  [facility]     Patient Name: Elli Edwards  : 1950  MRN: [id number]  Today's Date: 2020  Onset of Illness/Injury or Date of Surgery: 20  Date of Referral to OT: 20  Referring Physician: Dr. Peoples    Admit Date: 2020       ICD-10-CM ICD-9-CM   1. Impaired mobility and ADLs Z74.09 V49.89    Z78.9    2. Arthralgia of right hip M25.551 719.45   3. Primary osteoarthritis of right hip M16.11 715.15   4. Status post total hip replacement, right Z96.641 V43.64     Patient Active Problem List   Diagnosis   • Benign essential hypertension   • Polyp of colon   • Heartburn   • Hyperlipidemia   • Phlebitis   • Vertigo   • Vitamin D deficiency   • Internal hemorrhoids   • Diverticulosis of colon   • Diarrhea   • Arthralgia of right hip   • Primary osteoarthritis of right hip   • Status post total hip replacement, right     Past Medical History:   Diagnosis Date   • Arthritis    • Blisters of multiple sites     lower back   • Cataract     PT REPORTS BILATERAL EYES   • Colon polyp 2015   • Colon polyps 10/08/2019   • Depression    • Deviated septum     has to have breathe [employer] or tape on nose for surgery   • Diarrhea    • Diverticulosis    • Dry eyes    • Elevated cholesterol    • Frequency of urination    • GERD (gastroesophageal reflux disease)    • H/O cardiovascular stress test 10/07/2019    10+ years ago    • Hearing loss     Patient reported no use of hearing aids   • Hip pain     right hip   • Hypertension    • Hypothyroid    • Impaired functional mobility, balance, gait, and endurance    • Localized osteoarthritis of right knee    • Piercing     ears only   • Seasonal allergies     allergic to trees, grass/foxfire burns   • Tinnitus    • Wears glasses      Past Surgical History:   Procedure Laterality Date   • ADENOIDECTOMY     • CATARACT EXTRACTION Bilateral    • CATARACT EXTRACTION W/ INTRAOCULAR LENS IMPLANT Right 2017     Procedure: RIGHT CATARACT PHACO EXTRACTION WITH INTRAOCULAR LENS IMPLANT;  Surgeon: Miky Garza MD;  Location: [facility] OR;  Service:    • CATARACT EXTRACTION W/ INTRAOCULAR LENS IMPLANT Left 2/16/2017    Procedure: LEFT CATARACT PHACO EXTRACTION WITH INTRAOCULAR LENS IMPLANT;  Surgeon: Miky Garza MD;  Location: [facility] OR;  Service:    • CHOLECYSTECTOMY N/A 8/6/2019    Procedure: CHOLECYSTECTOMY LAPAROSCOPIC;  Surgeon: Oliver Aguilar MD;  Location: [facility] OR;  Service: General   • COLONOSCOPY  2016   • COLONOSCOPY N/A 10/8/2019    Procedure: COLONOSCOPY WITH POLYPECTOMY;  Surgeon: Lemuel Sagastume MD;  Location: [facility] ENDOSCOPY;  Service: Gastroenterology   • ENDOSCOPY     • LASER ABLATION      cervical   • MOUTH SURGERY      SEVERAL WISDOM TEETH EXTRACTED   • MOUTH SURGERY      ORAL IMPLANT   • TONSILLECTOMY      AT AGE 5   • TOTAL HIP ARTHROPLASTY Right 8/4/2020    Procedure: Total hip arthroplasty RIGHT (BIOMET);  Surgeon: Justice Peoples MD;  Location: [facility] OR;  Service: Orthopedics;  Laterality: Right;       Therapy Treatment    Rehabilitation Treatment Summary     Row Name 08/07/20 1014 08/07/20 0939          Treatment Time/Intention    Discipline  physical therapy assistant  -  occupational therapist  -     Document Type  therapy note (daily note)  -  therapy note (daily note)  -     Subjective Information  complains of;nausea/vomiting  -  complains of;nausea/vomiting  -     Mode of Treatment  physical therapy  -  occupational therapy  -     Patient/Family Observations  --  Pt alone, received sitting on eob  -     Care Plan Review  care plan/treatment goals reviewed  -  care plan/treatment goals reviewed;patient/other agree to care plan  -     Patient Effort  adequate  -RM  good  -     Existing Precautions/Restrictions  fall;right;hip, posterior  -  --     Recorded by [] Aime Angeles, SWETA 08/07/20 1123 [] Monie Ryan 08/07/20 1112     Row Name 08/07/20 1014              Safety Issues, Functional Mobility    Safety Issues Affecting Function (Mobility)  sequencing abilities  -RM      Impairments Affecting Function (Mobility)  endurance/activity tolerance;strength  -RM      Recorded by [] Aime Angeles, \[facility]\"" 08/07/20 1123      Row Name 08/07/20 1014             Mobility Assessment/Intervention    Right Lower Extremity (Weight-bearing Status)  weight-bearing as tolerated (WBAT)  -RM      Recorded by [] Aime Angeles, \[facility]\"" 08/07/20 1123      Row Name 08/07/20 0939             Functional Mobility    Functional Mobility- Ind. Level  contact guard assist  -AH      Functional Mobility- Device  rolling walker  -      Functional Mobility-Distance (Feet)  12  -AH      Recorded by [] Monie Ryan 08/07/20 1112      Row Name 08/07/20 1014 08/07/20 0939          Sit-Stand Transfer    Sit-Stand [place] (Transfers)  conditional independence  -  conditional independence  -     Assistive Device (Sit-Stand Transfers)  walker, front-wheeled  -  walker, front-wheeled  -     Recorded by [] Aime Angeles, \[facility]\"" 08/07/20 1123 [] Shelby Monie 08/07/20 1112     Row Name 08/07/20 1014 08/07/20 0939          Stand-Sit Transfer    Stand-Sit [place] (Transfers)  conditional independence  -  conditional independence  -     Assistive Device (Stand-Sit Transfers)  walker, front-wheeled  -RM  walker, front-wheeled  -     Recorded by [] Aime Angeles, \[facility]\"" 08/07/20 1123 [] Shelby Monie 08/07/20 1112     Row Name 08/07/20 1014             Gait/Stairs Assessment/Training    [place] Level (Gait)  contact guard;verbal cues  -      Assistive Device (Gait)  walker, front-wheeled  -      Distance in Feet (Gait)  14  -RM      Pattern (Gait)  step-to;step-through  -RM      Deviations/Abnormal Patterns (Gait)  antalgic;base of support, wide;gait speed decreased  -RM      Bilateral Gait Deviations  forward flexed posture  -RM      Right Sided Gait  Deviations  heel strike decreased  -RM      Recorded by [] Aime Angeles, PTA 08/07/20 1123      Row Name 08/07/20 0939             ADL Assessment/Intervention    BADL Assessment/Intervention  lower body dressing;bathing  -      Recorded by [] Monie Ryan 08/07/20 1112      Row Name 08/07/20 0939             Bathing Assessment/Intervention    Bathing [place] Level  contact guard assist simulated bathing task  -      Assistive Devices (Bathing)  long-handled sponge  -      Bathing Position  edge of bed sitting  -AH      Recorded by [] Monie Ryan 08/07/20 1112      Row Name 08/07/20 0939             Lower Body Dressing Assessment/Training    Lower Body Dressing [place] Level  doff;don;socks;undergarment;verbal cues;supervision  -      Assistive Devices (Lower Body Dressing)  reacher;sock-aid;dressing stick  -      Lower Body Dressing Position  edge of bed sitting  -AH      Recorded by [] Monie Ryan 08/07/20 1112      Row Name 08/07/20 0939             BADL Safety/Performance    Impairments, [facility] Safety/Performance  endurance/activity tolerance;strength  -      Recorded by [] Monie Ryan 08/07/20 1122      Row Name 08/07/20 0939             Therapeutic Exercise    Upper Extremity Range of Motion (Therapeutic Exercise)  shoulder flexion/extension, bilateral;shoulder horizontal abduction/adduction, bilateral;elbow flexion/extension, bilateral;shoulder internal/external rotation, bilateral  -      Weight/Resistance (Therapeutic Exercise)  yellow  -2      Exercise Type (Therapeutic Exercise)  isometric contraction, static;isotonic contraction, concentric  -2      Position (Therapeutic Exercise)  seated  -2      Sets/Reps (Therapeutic Exercise)  1 x 10  -2      Equipment (Therapeutic Exercise)  resistive bands  -2      Recorded by [] Monie Ryan 08/07/20 1112  [[facility]] Monie Ryan 08/07/20 1122      Row Name 08/07/20 1014 08/07/20 0939          Positioning  and Restraints    Pre-Treatment Position  sitting in chair/recliner  -RM  in bed  -AH     Post Treatment Position  chair  -RM  chair  -AH     In Chair  reclined;call light within reach;encouraged to call for assist;heels elevated  -RM  reclined;call light within reach;encouraged to call for assist  -     Recorded by [] Aime Angeles, PTA 08/07/20 1123 [] Monie Ryan 08/07/20 1122     Row Name 08/07/20 1014 08/07/20 0939          Pain Scale: Numbers Pre/Post-Treatment    Pain Scale: Numbers, Pretreatment  2/10  -RM  1/10  -AH     Pain Scale: Numbers, Post-Treatment  2/10  -RM  1/10  -AH     Pain Location - Side  Right  -RM  Right  -AH     Pain Location  hip  -RM  hip  -AH     Pain Intervention(s)  Repositioned;Ambulation/increased activity  -RM  Repositioned;Ambulation/increased activity  -     Recorded by [] Aime Angeles, PTA 08/07/20 1123 [] Monie Ryan 08/07/20 1122     Row Name                Wound 08/04/20 0836 Right anterior hip Incision    Wound - Properties Group Date first assessed: 08/04/20 [] Time first assessed: 0836 [] Side: Right [MH] Orientation: anterior [MH] Location: hip [] Primary Wound Type: Incision [] Recorded by:  [] Valentine Cartwright RN 08/04/20 0836    Row Name 08/07/20 0939             Coping    Observed Emotional State  accepting;cooperative  -      Verbalized Emotional State  acceptance  -      Recorded by [] Monie Ryan 08/07/20 1122      Row Name 08/07/20 1014 08/07/20 0939          Plan of Care Review    Plan of Care Reviewed With  patient  -RM  patient  -AH     Progress  no change  -RM  improving  -     Outcome Summary  Pt tolerated treatment well. Activitiy was limited d/t pt just working with OT.  Pt attempted to perform bed mobility with hospital bed adjusted to heights of two available beds pt has at home.  Pt was unable to enter either heights safely and maintain THP. Case managememnt was called and informed and would look into hospital  bed. Pt also reports could get a day bed temporarily. See flowsheet for details.    -  --     Recorded by [] Aime Angeles, PTA 08/07/20 1123 [] Monie Ryan 08/07/20 1122     Row Name 08/07/20 0939             Outcome Summary/Treatment Plan (OT)    Daily Summary of Progress (OT)  progress toward functional goals as expected  -      Anticipated Discharge Disposition (OT)  home with home health  -      Recorded by [] Monie Ryan 08/07/20 1122      Row Name 08/07/20 1014             Outcome Summary/Treatment Plan (PT)    Daily Summary of Progress (PT)  progress toward functional goals is good  -      Plan for Continued Treatment (PT)  Cont PT per POC.  -      Recorded by [] Aime Angeles, PTA 08/07/20 1123        User Key  (r) = Recorded By, (t) = Taken By, (c) = Cosigned By    Initials Name Effective Dates Discipline     Monie Ryan 03/07/18 -  OT    Aime Tovar, PTA 03/07/18 -  PT     Valentine Cartwright RN 03/18/20 -  Nurse        Wound 08/04/20 0836 Right anterior hip Incision (Active)   Dressing Appearance dry;intact;no drainage 8/7/2020  8:00 AM       Occupational Therapy Education                 Title: PT OT SLP Therapies (Done)     Topic: Occupational Therapy (Resolved)     Point: ADL training (Resolved)     Description:   Instruct learner(s) on proper safety adaptation and remediation techniques during self care or transfers.   Instruct in proper use of assistive devices.              Learning Progress Summary           Patient Acceptance, E,TB, VU by  at 8/7/2020 1122    Comment:  education on use of adaptive equipment    Acceptance, E,TB, VU by  at 8/5/2020 1115    Comment:  Role of OT/POC                   Point: Home exercise program (Resolved)     Description:   Instruct learner(s) on appropriate technique for monitoring, assisting and/or progressing therapeutic exercises/activities.              Learner Progress:   Not documented in this visit.           Point: Precautions (Resolved)     Description:   Instruct learner(s) on prescribed precautions during self-care and functional transfers.              Learner Progress:   Not documented in this visit.          Point: Body mechanics (Resolved)     Description:   Instruct learner(s) on proper positioning and spine alignment during self-care, functional mobility activities and/or exercises.              Learner Progress:   Not documented in this visit.                      User Key     Initials Effective Dates Name Provider Type Discipline     03/07/18 -  Monie Ryan Occupational Therapist OT                OT Recommendation and Plan  Outcome Summary/Treatment Plan (OT)  Daily Summary of Progress (OT): progress toward functional goals as expected  Anticipated Discharge Disposition (OT): home with home health  Therapy Frequency (OT Eval): 3 times/wk  Daily Summary of Progress (OT): progress toward functional goals as expected  Plan of Care Review  Plan of Care Reviewed With: patient  Plan of Care Reviewed With: patient  Outcome Summary: pt received sitting eob, c/o nausea.  Pt educated on adaptive equipment for LB dressing and bathing.   Pt able to doff/don her socks and underwear with verbal cues and supervision.  Pt CI to stand and cga to walk 12' around her bed to the chair.  Outcome Measures     Row Name 08/07/20 0939 08/06/20 1511 08/06/20 1040       How much help from another person do you currently need...    Turning from your back to your side while in flat bed without using bedrails?  --  2  -RM  2  -RM    Moving from lying on back to sitting on the side of a flat bed without bedrails?  --  2  -RM  2  -RM    Moving to and from a bed to a chair (including a wheelchair)?  --  3  -RM  3  -RM    Standing up from a chair using your arms (e.g., wheelchair, bedside chair)?  --  3  -RM  3  -RM    Climbing 3-5 steps with a railing?  --  2  -RM  2  -RM    To walk in hospital room?  --  3  -RM  2  -RM    AM-PAC 6  Clicks Score (PT)  --  15  -RM  14  -RM       How much help from another is currently needed...    Putting on and taking off regular lower body clothing?  3  -AH  --  --    Bathing (including washing, rinsing, and drying)  3  -AH  --  --    Toileting (which includes using toilet bed pan or urinal)  3  -AH  --  --    Putting on and taking off regular upper body clothing  4  -AH  --  --    Taking care of personal grooming (such as brushing teeth)  4  -AH  --  --    Eating meals  4  -AH  --  --    AM-PAC 6 Clicks Score (OT)  21  -AH  --  --       Functional Assessment    Outcome Measure Options  AM-PAC 6 Clicks Daily Activity (OT)  -  AM-PAC 6 Clicks Basic Mobility (PT)  -RM  AM-PAC 6 Clicks Basic Mobility (PT)  -    Row Name 08/05/20 0922 08/05/20 0814          How much help from another person do you currently need...    Turning from your back to your side while in flat bed without using bedrails?  --  3  -RM     Moving from lying on back to sitting on the side of a flat bed without bedrails?  --  3  -RM     Moving to and from a bed to a chair (including a wheelchair)?  --  3  -RM     Standing up from a chair using your arms (e.g., wheelchair, bedside chair)?  --  4  -RM     Climbing 3-5 steps with a railing?  --  2  -RM     To walk in hospital room?  --  3  -RM     AM-PAC 6 Clicks Score (PT)  --  18  -RM        How much help from another is currently needed...    Putting on and taking off regular lower body clothing?  2  -AH  --     Bathing (including washing, rinsing, and drying)  3  -AH  --     Toileting (which includes using toilet bed pan or urinal)  3  -AH  --     Putting on and taking off regular upper body clothing  4  -AH  --     Taking care of personal grooming (such as brushing teeth)  4  -AH  --     Eating meals  4  -AH  --     AM-PAC 6 Clicks Score (OT)  20  -AH  --        Functional Assessment    Outcome Measure Options  AM-PAC 6 Clicks Daily Activity (OT)  -  AM-PAC 6 Clicks Basic Mobility (PT)   -       User Key  (r) = Recorded By, (t) = Taken By, (c) = Cosigned By    Initials Name Provider Type     Monie Ryan Occupational Therapist    Aime Tovar, PTA Physical Therapy Assistant           Time Calculation:   Time Calculation- OT     Row Name 08/07/20 1126             Time Calculation- OT    OT Start Time  0939  -      OT Stop Time  1013  -      OT Time Calculation (min)  34 min  -      OT Received On  08/07/20  -      OT Goal Re-Cert Due Date  08/15/20  -         Timed Charges    78785 - OT Therapeutic Exercise Minutes  14  -      84342 - OT Self Care/Mgmt Minutes  20  -        User Key  (r) = Recorded By, (t) = Taken By, (c) = Cosigned By    Initials Name Provider Type     Monie Ryan Occupational Therapist        Therapy Charges for Today     Code Description Service Date Service Provider Modifiers Qty    60229378768 HC OT THER PROC EA 15 MIN 8/7/2020 Monie Ryan 1    95746322198 HC OT SELF CARE/MGMT/TRAIN EA 15 MIN 8/7/2020 Monie Ryan 1               Monie Ryan  8/7/2020

## 2020-08-07 NOTE — PROGRESS NOTES
Discharge Planning Assessment  [facility]     Patient Name: Elli Edwards  MRN: [id number]  Today's Date: 8/7/2020    Admit Date: 8/4/2020    Discharge Needs Assessment    No documentation.       Discharge Plan     Row Name 08/07/20 1211       Plan    Plan Comments  PT states patient's beds at home are too tall for her to get in   suggested hospital bed.  Dr Isaac and  Richelle notified.    Contacted Monie with [facility] states they will deliver bed.  about 160$ co payment.  Pt agreed        Destination      Coordination has not been started for this encounter.      Durable Medical Equipment      Coordination has not been started for this encounter.      Dialysis/Infusion      Coordination has not been started for this encounter.      Home Medical Care - Selection Complete      Service Provider Request Status Selected Services Address Phone Number Fax Number    [facility] [address] [place] [address] [phone] --      Therapy      Coordination has not been started for this encounter.      Community Resources      Coordination has not been started for this encounter.        Expected Discharge Date and Time     Expected Discharge Date Expected Discharge Time    Aug 7, 2020         Demographic Summary    No documentation.       Functional Status    No documentation.       Psychosocial    No documentation.       Abuse/Neglect    No documentation.       Legal    No documentation.       Substance Abuse    No documentation.       Patient Forms    No documentation.           Nan Fuller RN

## 2020-08-07 NOTE — THERAPY TREATMENT NOTE
Acute Care - Physical Therapy Treatment Note  [facility]     Patient Name: Elli Edwards  : 1950  MRN: [id number]  Today's Date: 2020  Onset of Illness/Injury or Date of Surgery: 20     Referring Physician: Dr. Peoples    Admit Date: 2020    Visit Dx:    ICD-10-CM ICD-9-CM   1. Impaired mobility and ADLs Z74.09 V49.89    Z78.9    2. Arthralgia of right hip M25.551 719.45   3. Primary osteoarthritis of right hip M16.11 715.15   4. Status post total hip replacement, right Z96.641 V43.64     Patient Active Problem List   Diagnosis   • Benign essential hypertension   • Polyp of colon   • Heartburn   • Hyperlipidemia   • Phlebitis   • Vertigo   • Vitamin D deficiency   • Internal hemorrhoids   • Diverticulosis of colon   • Diarrhea   • Arthralgia of right hip   • Primary osteoarthritis of right hip   • Status post total hip replacement, right       Therapy Treatment    Rehabilitation Treatment Summary     Row Name 20 1014 20 0939          Treatment Time/Intention    Discipline  physical therapy assistant  -  occupational therapist  -     Document Type  therapy note (daily note)  -  therapy note (daily note)  -     Subjective Information  complains of;nausea/vomiting  -  complains of;nausea/vomiting  -     Mode of Treatment  physical therapy  -  occupational therapy  -     Patient/Family Observations  --  Pt alone, received sitting on eob  -     Care Plan Review  care plan/treatment goals reviewed  -  care plan/treatment goals reviewed;patient/other agree to care plan  -     Patient Effort  adequate  -RM  good  -     Existing Precautions/Restrictions  fall;right;hip, posterior  -  --     Recorded by [] Aime Angeles, SWETA 20 1123 [] Monie Ryan 20 1112     Row Name 20 1014             Safety Issues, Functional Mobility    Safety Issues Affecting Function (Mobility)  sequencing abilities  -      Impairments Affecting Function  (Mobility)  endurance/activity tolerance;strength  -RM      Recorded by [] Aime Angeles, PTA 08/07/20 1123      Row Name 08/07/20 1014             Mobility Assessment/Intervention    Right Lower Extremity (Weight-bearing Status)  weight-bearing as tolerated (WBAT)  -RM      Recorded by [] Aime Angeles, PTA 08/07/20 1123      Row Name 08/07/20 0939             Functional Mobility    Functional Mobility- Ind. Level  contact guard assist  -AH      Functional Mobility- Device  rolling walker  -      Functional Mobility-Distance (Feet)  12  -AH      Recorded by [] Shelby Monie 08/07/20 1112      Row Name 08/07/20 1014 08/07/20 0939          Sit-Stand Transfer    Sit-Stand [place] (Transfers)  conditional independence  -  conditional independence  -     Assistive Device (Sit-Stand Transfers)  walker, front-wheeled  -RM  walker, front-wheeled  -     Recorded by [] Aime Angeles, PTA 08/07/20 1123 [] [place], [facility] 08/07/20 1112     Row Name 08/07/20 1014 08/07/20 0939          Stand-Sit Transfer    Stand-Sit [place] (Transfers)  conditional independence  -  conditional independence  -     Assistive Device (Stand-Sit Transfers)  walker, front-wheeled  -RM  walker, front-wheeled  -     Recorded by [] Aime Angeles, PTA 08/07/20 1123 [] Shelby Monie 08/07/20 1112     Row Name 08/07/20 1014             Gait/Stairs Assessment/Training    [place] Level (Gait)  contact guard;stand by assist;verbal cues  -      Assistive Device (Gait)  walker, front-wheeled  -RM2      Distance in Feet (Gait)  144  -RM      Pattern (Gait)  step-to;step-through  -RM2      Deviations/Abnormal Patterns (Gait)  antalgic;base of support, wide;gait speed decreased  -RM2      Bilateral Gait Deviations  forward flexed posture  -RM2      Right Sided Gait Deviations  heel strike decreased  -RM2      Recorded by [] Aime Angeles, PTA 08/07/20 1147  [2] Aime Angeles, [facility] 08/07/20  1123      Row Name 08/07/20 0939             ADL Assessment/Intervention    BADL Assessment/Intervention  lower body dressing;bathing  -      Recorded by [] Monie Ryan 08/07/20 1112      Row Name 08/07/20 0939             Bathing Assessment/Intervention    Bathing [place] Level  contact guard assist simulated bathing task  -      Assistive Devices (Bathing)  long-handled sponge  -      Bathing Position  edge of bed sitting  -      Recorded by [] Monie Ryan 08/07/20 1112      Row Name 08/07/20 0939             Lower Body Dressing Assessment/Training    Lower Body Dressing [place] Level  doff;don;socks;undergarment;verbal cues;supervision  -      Assistive Devices (Lower Body Dressing)  reacher;sock-aid;dressing stick  -      Lower Body Dressing Position  edge of bed sitting  -AH      Recorded by [] Monie Ryan 08/07/20 1112      Row Name 08/07/20 0939             BADL Safety/Performance    Impairments, [facility] Safety/Performance  endurance/activity tolerance;strength  -      Recorded by [] Monie Ryan 08/07/20 1122      Row Name 08/07/20 0939             Therapeutic Exercise    Upper Extremity Range of Motion (Therapeutic Exercise)  shoulder flexion/extension, bilateral;shoulder horizontal abduction/adduction, bilateral;elbow flexion/extension, bilateral;shoulder internal/external rotation, bilateral  -      Weight/Resistance (Therapeutic Exercise)  yellow  -2      Exercise Type (Therapeutic Exercise)  isometric contraction, static;isotonic contraction, concentric  -2      Position (Therapeutic Exercise)  seated  -2      Sets/Reps (Therapeutic Exercise)  1 x 10  -2      Equipment (Therapeutic Exercise)  resistive bands  -2      Recorded by [] Monie Ryan 08/07/20 1112  [[facility]] Monie Ryan 08/07/20 1122      Row Name 08/07/20 1014 08/07/20 0939          Positioning and Restraints    Pre-Treatment Position  sitting in chair/recliner  -  in bed  -     Post  Treatment Position  chair  -RM  chair  -AH     In Chair  reclined;call light within reach;encouraged to call for assist;heels elevated  -RM  reclined;call light within reach;encouraged to call for assist  -     Recorded by [] Aime Anegles, PTA 08/07/20 1123 [] Monie Ryan 08/07/20 1122     Row Name 08/07/20 1014 08/07/20 0939          Pain Scale: Numbers Pre/Post-Treatment    Pain Scale: Numbers, Pretreatment  2/10  -RM  1/10  -AH     Pain Scale: Numbers, Post-Treatment  2/10  -RM  1/10  -AH     Pain Location - Side  Right  -RM  Right  -AH     Pain Location  hip  -RM  hip  -AH     Pain Intervention(s)  Repositioned;Ambulation/increased activity  -  Repositioned;Ambulation/increased activity  -     Recorded by [] Aime Angeles, PTA 08/07/20 1123 [] Monie Ryan 08/07/20 1122     Row Name                Wound 08/04/20 0836 Right anterior hip Incision    Wound - Properties Group Date first assessed: 08/04/20 [] Time first assessed: 0836 [] Side: Right [MH] Orientation: anterior [] Location: hip [] Primary Wound Type: Incision [] Recorded by:  [] Valentine Cartwright RN 08/04/20 0836    Row Name 08/07/20 0939             Coping    Observed Emotional State  accepting;cooperative  -      Verbalized Emotional State  acceptance  -      Recorded by [] Monie Ryan 08/07/20 1122      Row Name 08/07/20 1014 08/07/20 0939          Plan of Care Review    Plan of Care Reviewed With  patient  -RM  patient  -     Progress  no change  -  improving  -     Outcome Summary  Pt tolerated treatment well. Activitiy was limited d/t pt just working with OT.  Pt attempted to perform bed mobility with hospital bed adjusted to heights of two available beds pt has at home.  Pt was unable to enter either heights safely and maintain THP. Case managememnt was called and informed and would look into hospital bed. Pt also reports could get a day bed temporarily. See flowsheet for details.    -  --      Recorded by [] Aime Angeles, PTA 08/07/20 1123 [] Monie Ryan 08/07/20 1122     Row Name 08/07/20 0939             Outcome Summary/Treatment Plan (OT)    Daily Summary of Progress (OT)  progress toward functional goals as expected  -      Anticipated Discharge Disposition (OT)  home with home health  -      Recorded by [] Monie Ryan 08/07/20 1122      Row Name 08/07/20 1014             Outcome Summary/Treatment Plan (PT)    Daily Summary of Progress (PT)  progress toward functional goals is good  -      Plan for Continued Treatment (PT)  Cont PT per POC.  -      Recorded by [] Aime Angeles, PTA 08/07/20 1123        User Key  (r) = Recorded By, (t) = Taken By, (c) = Cosigned By    Initials Name Effective Dates Discipline    NOLAN OleaShelbyMonie 03/07/18 -  OT    Aime Tovar, PTA 03/07/18 -  PT    Valentine Cote RN 03/18/20 -  Nurse          Wound 08/04/20 0836 Right anterior hip Incision (Active)   Dressing Appearance dry;intact;no drainage 8/7/2020  8:00 AM           Physical Therapy Education                 Title: PT OT SLP Therapies (Done)     Topic: Physical Therapy (Done)     Point: Mobility training (Done)     Description:   Instruct learner(s) on safety and technique for assisting patient out of bed, chair or wheelchair.  Instruct in the proper use of assistive devices, such as walker, crutches, cane or brace.              Patient Friendly Description:   It's important to get you on your feet again, but we need to do so in a way that is safe for you. Falling has serious consequences, and your personal safety is the most important thing of all.        When it's time to get out of bed, one of us or a family member will sit next to you on the bed to give you support.     If your doctor or nurse tells you to use a walker, crutches, a cane, or a brace, be sure you use it every time you get out of bed, even if you think you don't need it.    Learning Progress Summary            Patient Acceptance, E,TB,D, VU,NR by  at 8/7/2020 1124    Acceptance, E,TB,D, VU,NR by  at 8/6/2020 1659    Acceptance, E,TB,D, VU,NR by  at 8/6/2020 1149    Acceptance, E,TB,D, VU,NR by  at 8/5/2020 1232    Comment:  HEP copy given to pt and one placed in chart, sequencing during gait and THP.    Acceptance, E,TB, VU by  at 8/4/2020 1932    Comment:  Role of PT and POC                   Point: Home exercise program (Resolved)     Description:   Instruct learner(s) on appropriate technique for monitoring, assisting and/or progressing patient with therapeutic exercises and activities.              Learning Progress Summary           Patient Acceptance, E,TB,D, VU,NR by  at 8/6/2020 1149    Acceptance, E,TB,D, VU,NR by  at 8/5/2020 1232    Comment:  HEP copy given to pt and one placed in chart, sequencing during gait and THP.                   Point: Body mechanics (Resolved)     Description:   Instruct learner(s) on proper positioning and spine alignment for patient and/or caregiver during mobility tasks and/or exercises.              Learner Progress:   Not documented in this visit.          Point: Precautions (Done)     Description:   Instruct learner(s) on prescribed precautions during mobility and gait tasks              Learning Progress Summary           Patient Acceptance, E,TB,D, VU,NR by  at 8/7/2020 1124    Acceptance, E,TB,D, VU,NR by  at 8/6/2020 1149    Acceptance, E,TB,D, VU,NR by  at 8/5/2020 1232    Comment:  HEP copy given to pt and one placed in chart, sequencing during gait and THP.                               User Key     Initials Effective Dates Name Provider Type Discipline    JR 04/03/18 -  Little Hagen, PT Physical Therapist PT    RM 03/07/18 -  Aime Angeles, PTA Physical Therapy Assistant PT                PT Recommendation and Plan     Outcome Summary/Treatment Plan (PT)  Daily Summary of Progress (PT): progress toward functional goals is good  Plan for  Continued Treatment (PT): Cont PT per POC.  Plan of Care Reviewed With: patient  Progress: no change  Outcome Summary: Pt tolerated treatment well. Activitiy was limited d/t pt just working with OT.  Pt attempted to perform bed mobility with hospital bed adjusted to heights of two available beds pt has at home.  Pt was unable to enter either heights safely and maintain THP. Case managememnt was called and informed and would look into hospital bed. Pt also reports could get a day bed temporarily. See flowsheet for details.    Outcome Measures     Row Name 08/07/20 1014 08/07/20 0939 08/06/20 1511       How much help from another person do you currently need...    Turning from your back to your side while in flat bed without using bedrails?  3  -RM  --  2  -RM    Moving from lying on back to sitting on the side of a flat bed without bedrails?  3  -RM  --  2  -RM    Moving to and from a bed to a chair (including a wheelchair)?  3  -RM  --  3  -RM    Standing up from a chair using your arms (e.g., wheelchair, bedside chair)?  3  -RM  --  3  -RM    Climbing 3-5 steps with a railing?  2  -RM  --  2  -RM    To walk in hospital room?  3  -RM  --  3  -RM    AM-PAC 6 Clicks Score (PT)  17  -RM  --  15  -RM       How much help from another is currently needed...    Putting on and taking off regular lower body clothing?  --  3  -AH  --    Bathing (including washing, rinsing, and drying)  --  3  -AH  --    Toileting (which includes using toilet bed pan or urinal)  --  3  -AH  --    Putting on and taking off regular upper body clothing  --  4  -AH  --    Taking care of personal grooming (such as brushing teeth)  --  4  -AH  --    Eating meals  --  4  -AH  --    AM-PAC 6 Clicks Score (OT)  --  21  -AH  --       Functional Assessment    Outcome Measure Options  AM-PAC 6 Clicks Basic Mobility (PT)  -RM  AM-PAC 6 Clicks Daily Activity (OT)  -AH  AM-PAC 6 Clicks Basic Mobility (PT)  -RM    Row Name 08/06/20 1040 08/05/20 0922 08/05/20  0814       How much help from another person do you currently need...    Turning from your back to your side while in flat bed without using bedrails?  2  -RM  --  3  -RM    Moving from lying on back to sitting on the side of a flat bed without bedrails?  2  -RM  --  3  -RM    Moving to and from a bed to a chair (including a wheelchair)?  3  -RM  --  3  -RM    Standing up from a chair using your arms (e.g., wheelchair, bedside chair)?  3  -RM  --  4  -RM    Climbing 3-5 steps with a railing?  2  -RM  --  2  -RM    To walk in hospital room?  2  -RM  --  3  -RM    AM-PAC 6 Clicks Score (PT)  14  -RM  --  18  -RM       How much help from another is currently needed...    Putting on and taking off regular lower body clothing?  --  2  -AH  --    Bathing (including washing, rinsing, and drying)  --  3  -AH  --    Toileting (which includes using toilet bed pan or urinal)  --  3  -AH  --    Putting on and taking off regular upper body clothing  --  4  -AH  --    Taking care of personal grooming (such as brushing teeth)  --  4  -AH  --    Eating meals  --  4  -AH  --    AM-PAC 6 Clicks Score (OT)  --  20  -AH  --       Functional Assessment    Outcome Measure Options  AM-PAC 6 Clicks Basic Mobility (PT)  -RM  AM-PAC 6 Clicks Daily Activity (OT)  -  AM-PAC 6 Clicks Basic Mobility (PT)  -RM      User Key  (r) = Recorded By, (t) = Taken By, (c) = Cosigned By    Initials Name Provider Type    Monie Noel Occupational Therapist    Aime Tovar, PTA Physical Therapy Assistant         Time Calculation:   PT Charges     Row Name 08/07/20 1147             Time Calculation    Start Time  1014  -RM      Stop Time  1044  -RM      Time Calculation (min)  30 min  -RM      PT Received On  08/07/20  -RM      PT Goal Re-Cert Due Date  08/14/20  -RM         Time Calculation- PT    Total Timed Code Minutes- PT  30 minute(s)  -RM         Timed Charges    16912 - Gait Training Minutes   15  -RM      88580 - PT Therapeutic  Activity Minutes  15  -RM        User Key  (r) = Recorded By, (t) = Taken By, (c) = Cosigned By    Initials Name Provider Type    Aime Tovar, PTA Physical Therapy Assistant        Therapy Charges for Today     Code Description Service Date Service Provider Modifiers Qty    41041857624 HC PT THER PROC EA 15 MIN 8/6/2020 Aiem Angeles, PTA GP 1    33417389787 HC GAIT TRAINING EA 15 MIN 8/6/2020 Aime Angeles, PTA GP 1    62558325736 HC PT THERAPEUTIC ACT EA 15 MIN 8/6/2020 Aime Angeles, PTA GP 1    60884236296 HC GAIT TRAINING EA 15 MIN 8/6/2020 Aime Angeles, PTA GP 1    17975561750 HC PT THERAPEUTIC ACT EA 15 MIN 8/6/2020 iAme Angeles, PTA GP 1    75923804933 HC GAIT TRAINING EA 15 MIN 8/7/2020 Aime Angeles, PTA GP 1    59191074445 HC PT THERAPEUTIC ACT EA 15 MIN 8/7/2020 Aime Angeles, PTA GP 1          PT G-Codes  Outcome Measure Options: AM-PAC 6 Clicks Basic Mobility (PT)  AM-PAC 6 Clicks Score (PT): 17  AM-PAC 6 Clicks Score (OT): 21    Aime Angeles PTA  8/7/2020

## 2020-08-07 NOTE — PLAN OF CARE
Problem: Patient Care Overview  Goal: Plan of Care Review  8/7/2020 1123 by Monie Ryan  Outcome: Ongoing (interventions implemented as appropriate)  Flowsheets (Taken 8/7/2020 0909)  Outcome Summary: pt received sitting eob, c/o nausea.  Pt educated on adaptive equipment for LB dressing and bathing.   Pt able to doff/don her socks and underwear with verbal cues and supervision.  Pt CI to stand and cga to walk 12' around her bed to the chair.

## 2020-08-07 NOTE — DISCHARGE SUMMARY
[facility]  ORTHOPAEDIC SURGERY DISCHARGE SUMMARY      Name:  Elli Edwards   Age:  69 y.o.  Sex:  female  :  1950  MRN:  [id number]   Visit Number:  [id number]  Primary Care Physician:  Elli Trinh MD  Date of Discharge:  2020  Admission Date:  2020      Discharge Diagnosis:     Status post right total hip replacement.  Nausea with treatment and resolution.  Hypokalemia - supplemented.    Patient Active Problem List   Diagnosis   • Benign essential hypertension   • Polyp of colon   • Heartburn   • Hyperlipidemia   • Phlebitis   • Vertigo   • Vitamin D deficiency   • Internal hemorrhoids   • Diverticulosis of colon   • Diarrhea   • Arthralgia of right hip   • Primary osteoarthritis of right hip   • Status post total hip replacement, right       Presenting Problem/History of Present Illness:    Arthralgia of right hip [M25.551]  Primary osteoarthritis of right hip [M16.11]  Status post total hip replacement, right [Z96.641]  Arthralgia of right hip [M25.551]     Consults:     Consults     Date and Time Order Name Status Description    2020 1228 Inpatient Consult to Hospitalist Completed           Procedures Performed:    Procedure(s):  Total hip arthroplasty RIGHT (BIOMET)         History of presenting illness:    69 year old woman with chronic progressive and eventually intractable pain, stiffness, joint grinding, painful limp and physical and ambulation marked limitations from end-stage right hip osteoarthritis.  Non-surgical treatments including medication, exercise therapy and a recent injection therapy not effective in controlling symptoms nor improving quality of life.  Patient elects to and presented on 2020 for planned right total hip replacement.    Hospital Course:    Stable other than some abdominal pain and nausea for which she was kept an additional day and that responded to treatment.   This morning she states the nausea has resolved, abdomen soft she  is passing flatus routinely and she has not yet had a bowel movement though she states she really only started eating yesterday.  We will anticipate and monitor for a bowel movement prior to discharge today.  She will also receive physical therapy at the hospital today, one or perhaps two sessions, prior to discharge home today with Home Health PT/OT services.    Vital Signs:    Temp:  [97.4 °F (36.3 °C)-98.5 °F (36.9 °C)] 98.5 °F (36.9 °C)  Heart Rate:  [84-94] 92  Resp:  [16-18] 18  BP: (110-122)/(52-55) 122/52    Physical Exam:    General Appearance: alert, pleasant and cooperative  Head: normocephalic, without obvious abnormality and atraumatic  Neck: supple and trachea midline  Lungs: respirations regular, respirations even and respirations unlabored  Heart: regular rhythm & normal rate  Abdomen: normal bowel sounds and soft non-tender  Extremities: moves extremities well, no cyanosis and Markie's sign negative  Pulses: Pulses palpable and equal bilaterally  Skin: color normal and clean, intact, pristine Aquacel dressing at right hip.  Neurologic: Mental Status alertness alert, orientation person, place and city, mood/affect normal and thought content exhibits organized, Speech normal, Sensory intact, Motor Normal motor including right hip, leg, ankle and foot with no deficit.  Psych: normal      Pertinent Lab Results:     Lab Results (all)     Procedure Component Value Units Date/Time    CBC (No Diff) [[id number]]  (Abnormal) Collected:  08/07/20 0624    Specimen:  Blood Updated:  08/07/20 0636     WBC 11.08 10*3/mm3      RBC 3.12 10*6/mm3      Hemoglobin 9.5 g/dL      Hematocrit 29.1 %      MCV 93.3 fL      MCH 30.4 pg      MCHC 32.6 g/dL      RDW 13.1 %      RDW-SD 44.8 fl      MPV 11.5 fL      Platelets 177 10*3/mm3     Tissue Pathology Exam [[id number]] Collected:  08/04/20 0904    Specimen:  Bone from Hip, Right Updated:  08/07/20 0205     Case Report --     Surgical Pathology Report                          Case: [id number]                                  Authorizing Provider:  Justice Peoples MD     Collected:           08/04/2020 09:04 AM          Ordering Location:     [facility] OR Received:            08/04/2020 01:42 PM          Pathologist:           Dawood Taylor MD                                                             Specimen:    Hip, Right, Femoral Head                                                                    Final Diagnosis --     See Scanned Report        Magnesium [[id number]]  (Normal) Collected:  08/06/20 1015    Specimen:  Blood Updated:  08/06/20 1112     Magnesium 1.9 mg/dL     Basic Metabolic Panel [[id number]]  (Abnormal) Collected:  08/06/20 1015    Specimen:  Blood Updated:  08/06/20 1036     Glucose 117 mg/dL      BUN 16 mg/dL      Creatinine 0.85 mg/dL      Sodium 138 mmol/L      Potassium 3.3 mmol/L      Chloride 97 mmol/L      CO2 33.6 mmol/L      Calcium 9.2 mg/dL      eGFR Non African Amer 66 mL/min/1.73      BUN/Creatinine Ratio 18.8     Anion Gap 7.4 mmol/L     Narrative:       GFR Normal >60  Chronic Kidney Disease <60  Kidney Failure <15      CBC & Differential [[id number]] Collected:  08/06/20 1015    Specimen:  Blood Updated:  08/06/20 1022    Narrative:       The following orders were created for panel order CBC & Differential.  Procedure                               Abnormality         Status                     ---------                               -----------         ------                     CBC Auto Differential[[id number]]        Abnormal            Final result                 Please view results for these tests on the individual orders.    CBC Auto Differential [[id number]]  (Abnormal) Collected:  08/06/20 1015    Specimen:  Blood Updated:  08/06/20 1022     WBC 14.08 10*3/mm3      RBC 3.51 10*6/mm3      Hemoglobin 10.6 g/dL      Hematocrit 32.3 %      MCV 92.0 fL      MCH 30.2 pg      MCHC 32.8 g/dL      RDW 13.1 %      RDW-SD 43.8 fl       MPV 10.9 fL      Platelets 196 10*3/mm3      Neutrophil % 78.0 %      Lymphocyte % 8.7 %      Monocyte % 12.5 %      Eosinophil % 0.1 %      Basophil % 0.4 %      Immature Grans % 0.3 %      Neutrophils, Absolute 11.00 10*3/mm3      Lymphocytes, Absolute 1.22 10*3/mm3      Monocytes, Absolute 1.76 10*3/mm3      Eosinophils, Absolute 0.01 10*3/mm3      Basophils, Absolute 0.05 10*3/mm3      Immature Grans, Absolute 0.04 10*3/mm3      nRBC 0.0 /100 WBC     Basic Metabolic Panel [[id number]]  (Abnormal) Collected:  08/05/20 0547    Specimen:  Blood Updated:  08/05/20 0633     Glucose 140 mg/dL      BUN 13 mg/dL      Creatinine 0.65 mg/dL      Sodium 138 mmol/L      Potassium 3.5 mmol/L      Chloride 96 mmol/L      CO2 31.4 mmol/L      Calcium 8.9 mg/dL      eGFR Non African Amer 90 mL/min/1.73      BUN/Creatinine Ratio 20.0     Anion Gap 10.6 mmol/L     Narrative:       GFR Normal >60  Chronic Kidney Disease <60  Kidney Failure <15      CBC & Differential [[id number]] Collected:  08/05/20 0547    Specimen:  Blood Updated:  08/05/20 0620    Narrative:       The following orders were created for panel order CBC & Differential.  Procedure                               Abnormality         Status                     ---------                               -----------         ------                     CBC Auto Differential[[id number]]        Abnormal            Final result                 Please view results for these tests on the individual orders.    CBC Auto Differential [[id number]]  (Abnormal) Collected:  08/05/20 0547    Specimen:  Blood Updated:  08/05/20 0620     WBC 14.77 10*3/mm3      RBC 3.99 10*6/mm3      Hemoglobin 12.0 g/dL      Hematocrit 36.5 %      MCV 91.5 fL      MCH 30.1 pg      MCHC 32.9 g/dL      RDW 12.8 %      RDW-SD 43.1 fl      MPV 11.5 fL      Platelets 228 10*3/mm3      Neutrophil % 76.5 %      Lymphocyte % 10.2 %      Monocyte % 12.5 %      Eosinophil % 0.0 %      Basophil % 0.1 %      Immature  Grans % 0.7 %      Neutrophils, Absolute 11.30 10*3/mm3      Lymphocytes, Absolute 1.50 10*3/mm3      Monocytes, Absolute 1.85 10*3/mm3      Eosinophils, Absolute 0.00 10*3/mm3      Basophils, Absolute 0.02 10*3/mm3      Immature Grans, Absolute 0.10 10*3/mm3      nRBC 0.0 /100 WBC           Pertinent Radiology Results:    Imaging Results (All)     Procedure Component Value Units Date/Time    XR Abdomen 1 View [[id number]] Collected:  08/06/20 1149     Updated:  08/06/20 1151    Narrative:       PROCEDURE: XR ABDOMEN 1 VW-     HISTORY: distension; Z74.09-Other reduced mobility; Z78.9-Other  specified health status; M25.551-Pain in right hip; M16.11-Unilateral  primary osteoarthritis, right hip; Z96.641-Presence of right artificial  hip joint     COMPARISON: None.     FINDINGS: An AP view of the abdomen and pelvis demonstrates a  nonspecific, nonobstructive bowel gas pattern. The patient is status  post cholecystectomy. The patient is undergone right hip arthroplasty.       Impression:       Nonspecific, nonobstructive bowel gas pattern.           This report was finalized on 8/6/2020 11:49 AM by Sonya Arias M.D..    [id number] Resulted:  08/04/20 2105     Updated:  08/04/20 2105    XR Pelvis 1 or 2 View [[id number]] Collected:  08/04/20 1136     Updated:  08/04/20 1138    Narrative:       PROCEDURE: XR PELVIS 1 OR 2 VW-     HISTORY: Post-op R THR; M25.551-Pain in right hip; M16.11-Unilateral  primary osteoarthritis, right hip     COMPARISON: None.     FINDINGS: The patient is status post total right hip arthroplasty. There  are no hardware complications or fractures. The pubic symphysis and SI  joints are intact. Soft tissue gas is seen in the right hip.       Impression:       Status post total right hip arthroplasty with no hardware  complications.     This report was finalized on 8/4/2020 11:36 AM by Sonya Arias M.D..          Condition on Discharge:      Stable    Code  status during the hospital stay:    Full    Discharge Disposition:    Home-Health Care [facility]    Discharge Medication:       Discharge Medications      New Medications      Instructions Start Date   docusate sodium 250 MG capsule  Commonly known as:  COLACE   250 mg, Oral, Daily      enoxaparin 40 MG/0.4ML solution syringe  Commonly known as:  Lovenox   40 mg, Subcutaneous, Every 24 Hours Scheduled      famotidine 20 MG tablet  Commonly known as:  Pepcid   20 mg, Oral, 2 Times Daily      HYDROcodone-acetaminophen 7.5-325 MG per tablet  Commonly known as:  NORCO   1 tablet, Oral, Every 6 Hours PRN      ondansetron ODT 8 MG disintegrating tablet  Commonly known as:  Zofran ODT   8 mg, Translingual, Every 8 Hours PRN      promethazine 12.5 MG tablet  Commonly known as:  PHENERGAN   12.5 mg, Oral, Every 12 Hours PRN         Continue These Medications      Instructions Start Date   atorvastatin 40 MG tablet  Commonly known as:  LIPITOR   40 mg, Oral, Daily      calcium carbonate 600 MG tablet  Commonly known as:  OS-ZEESHAN   600 mg, Oral, Daily      cholecalciferol 25 MCG (1000 UT) tablet  Commonly known as:  VITAMIN D3   1,000 Units, Oral, Daily      lisinopril 20 MG tablet  Commonly known as:  PRINIVIL,ZESTRIL   20 mg, Oral, Daily      sertraline 100 MG tablet  Commonly known as:  ZOLOFT   100 mg, Oral, Daily         Stop These Medications    chlorthalidone 25 MG tablet  Commonly known as:  HYGROTON            Discharge Diet:     Diet Instructions     Advance Diet as Tolerated      Advance Diet as Tolerated            Activity at Discharge:     Activity Instructions     Change Dressing      Change dressing once daily starting 7th post-operative day, and keep a clean dry dressing in place.    Change Dressing      Maintain Aquacel dressing until 8- and then may remove.    Patient May Shower; No Tub Baths, Pools or Hot Tubs      May change dressing routinely, keep a clean dry dressing in place.    Patient May Shower; No  Tub Baths, Pools or Hot Tubs      May change dressing routinely, keep a clean dry dressing in place.    Weight Bearing As Tolerated      Protected weight bearing as tolerated, with cane, crutches or walker as appropriate for condition and safety.    Weight Bearing As Tolerated      Protected weight bearing as tolerated, with cane, crutches or walker as appropriate for condition and safety.  Right total hip replacement precautions.          Follow-up Appointments:    Future Appointments   Date Time Provider Department Center   8/17/2020  9:00 AM Oliverio Brock PA-C [facility]     Additional Instructions for the Follow-ups that You Need to Schedule     Ambulatory Referral to Home Health   As directed      Face to Face Visit Date:  8/6/2020    Follow-up provider for Plan of Care?:  I will be treating the patient on an ongoing basis.  Please send me the Plan of Care for signature.    Follow-up provider:  SHARON SEE [[id number]]    Reason/Clinical Findings:  s/p right richard    Describe mobility limitations that make leaving home difficult:  decreased mobility    Nursing/Therapeutic Services Requested:  Physical Therapy Occupational Therapy    PT orders:  Total joint pathway Gait Training Transfer training Therapeutic exercise    Weight Bearing Status:  As Tolerated    Frequency:  1 Week 1         Apply Ice to Affected Extremity Every 2 Hours   As directed      Only 2 hours maximum at a time, only three to four times every 24 hours.    Order Comments:  Only 2 hours maximum at a time, only three to four times every 24 hours.          Apply Ice to Affected Extremity Every 2 Hours   As directed      Only 2 hours maximum at a time, only three to four times every 24 hours.    Order Comments:  Only 2 hours maximum at a time, only three to four times every 24 hours.          Discharge Follow-up with PCP   As directed       Currently Documented PCP:    Elli Trinh MD    PCP Phone Number:    [phone]      Follow Up Details:  with patient primary care physician in 1 month as appropriate.         Discharge Follow-up with PCP   As directed       Currently Documented PCP:    Elli Trinh MD    PCP Phone Number:    [phone]     Follow Up Details:  with patient primary care physician in 1 -3 months as appropriate.         Discharge Follow-up with Specified Provider: Juan Peoples MD   As directed      To:  Juan Peoples MD    Follow Up Details:  at scheduled appointment - check on [facility] patient appointments for date and time.         Discharge Follow-up with Specified Provider: Juan Peoples MD   As directed      To:  Juan Peoples MD    Follow Up Details:  at scheduled appointment - check on [facility] patient appointments for date and time.         Elevate Affected Extremity At or Above Level of Heart   As directed      Keep above level of heart at periodic intervals to reduce swelling.    Order Comments:  Keep above level of heart at periodic intervals to reduce swelling.                     Justice Peoples MD  08/07/20  11:27    Time: Discharge 25 min

## 2020-08-07 NOTE — PROGRESS NOTES
[facility]    Name:  Elli Edwards   Age:  69 y.o.  Sex:  female  :  1950  MRN:  [id number]   Visit Number:  [id number]  Admission Date:  2020  Date Of Service:  20  Primary Care Physician:  Elli Trinh MD     LOS: 1 day :  Patient Care Team:  Elli Trinh MD as PCP - General:    Chief Complaint:      Follow-up right total hip replacement    Subjective / Interval History:     Patient denies any significant pain from the hip.  She admits that the pain medication seems to make her nauseous.  She feels like she is going to have a bowel movement soon.  Denies any abdominal pain.  No vomiting.  Eating and drinking well.  She admits to significant weakness, she states her bed at home is very elevated and she does not currently feel she can get in and out of bed secondary to the hip pain and decreased ambulation with weakness.  Discussed in place an order for her a hospital bed, she is agreeable to this.  Case management was notified.    Prior work-up:  The patient is a 69-year-old female who underwent elective right total hip replacement this morning with Dr. Peoples.  We were asked to see patient in consultation for perioperative medical management.  She does have a medical history of osteoarthritis, hypertension, GERD, hyperlipidemia, vitamin D deficiency.  She denies any recent medication changes.  Her daughter is a nurse and is at bedside.  She states she was on a statin, but this caused myalgias and was discontinued.  She otherwise takes the lisinopril in addition to vitamin D and Os-Jessee.  She is on Zoloft as well.  She admits to some mild discomfort in her right hip currently, but otherwise denies any acute complaints.  Denies any shortness of breath.  She does state she has a deviated septum, does have low oxygen at times at night when she is sleeping when she has been in the hospital before.  Denies any diagnosis of sleep  apnea.    Review of Systems:     General ROS: Patient denies any fevers, chills or loss of consciousness.  Respiratory ROS: Denies cough or shortness of breath.  Cardiovascular ROS: Denies chest pain or palpitations. No history of exertional chest pain.  Gastrointestinal ROS: Denies nausea and vomiting. Denies any abdominal pain. No diarrhea.  Neurological ROS: Denies any focal weakness. No loss of consciousness. Denies any numbness.  Dermatological ROS: Denies any redness or pruritis.    Vital Signs:    Temp:  [97.7 °F (36.5 °C)-98.5 °F (36.9 °C)] 98.5 °F (36.9 °C)  Heart Rate:  [84-94] 92  Resp:  [18] 18  BP: (110-122)/(52-55) 122/52    Intake and output:    I/O last 3 completed shifts:  In: 1895 [P.O.:1080; I.V.:815]  Out: 1200 [Urine:1200]  I/O this shift:  In: 120 [P.O.:120]  Out: -     Physical Examination:    General Appearance:  Alert and cooperative, not in any acute distress.   Head:  Atraumatic and normocephalic, without obvious abnormality.   Eyes:          PERRLA, conjunctivae and sclerae normal, no Icterus. No pallor. Extraocular movements are within normal limits.   Neck: Supple, trachea midline, no thyromegaly.   Lungs:   Breath sounds heard bilaterally equally.  No crackles or wheezing. No Pleural rub or bronchial breathing.   Heart:  Normal S1 and S2, no murmur, no gallop, no rub. No JVD   Abdomen:   Normal bowel sounds, no masses, no organomegaly. Soft, non-tender, non-distended, no guarding, no rebound tenderness.  Obese   Extremities: Moves all extremities well, albeit leg movement limited on the right secondary to pain, no edema, no cyanosis, no clubbing.   Skin: No bleeding, bruising or rash.   Neurologic: Awake, alert and oriented times 3. Moves all 4 extremities equally.     Laboratory results:    Results from last 7 days   Lab Units 08/06/20  1015 08/05/20  0547   SODIUM mmol/L 138 138   POTASSIUM mmol/L 3.3* 3.5   CHLORIDE mmol/L 97* 96*   CO2 mmol/L 33.6* 31.4*   BUN mg/dL 16 13    CREATININE mg/dL 0.85 0.65   CALCIUM mg/dL 9.2 8.9   GLUCOSE mg/dL 117* 140*     Results from last 7 days   Lab Units 08/07/20  0624 08/06/20  1015 08/05/20  0547   WBC 10*3/mm3 11.08* 14.08* 14.77*   HEMOGLOBIN g/dL 9.5* 10.6* 12.0   HEMATOCRIT % 29.1* 32.3* 36.5   PLATELETS 10*3/mm3 177 196 228                       I have reviewed the patient's laboratory results.    Radiology results:    Imaging Results (Last 24 Hours)     ** No results found for the last 24 hours. **          I have reviewed the patient's radiology reports.    Medication Review:     I have reviewed the patients active and prn medications.       Arthralgia of right hip    Primary osteoarthritis of right hip    Status post total hip replacement, right      Assessment:    1.  Primary right hip osteoarthritis, status post total hip replacement  2.  Essential hypertension  3.  Dyslipidemia  4.  GERD  5.  History of colon polyps  6.  Obesity  7.  Deviated septum    Plan:    Patient overall is hemodynamically stable.  White count is trended down, hemoglobin overall stable at 9.5, no evidence of active bleeding.  Nausea does improve with the use of Zofran as needed promethazine.  Per orthopedics, plan for discharge today for which I feel is appropriate.  Would recommend following up with her PCP in regards to her blood pressure management, will hold her chlorthalidone for now.  Suspect her blood pressure will be a little higher as she resumes her normal diet.  Did place order for hospital bed upon discharge.  Otherwise, contact with any questions or concerns.    Kat Isaac DO  08/07/20  12:25    Dictated utilizing Dragon dictation.

## 2020-08-07 NOTE — PROGRESS NOTES
Discharge Planning Assessment  [facility]     Patient Name: Elli Edwards  MRN: [id number]  Today's Date: 8/7/2020    Admit Date: 8/4/2020    Discharge Needs Assessment    No documentation.       Discharge Plan     Row Name 08/07/20 0855       Plan    Plan Comments  Contacted [facility] that patient is discharging today.  States  they will see patient        Destination      Coordination has not been started for this encounter.      Durable Medical Equipment      Coordination has not been started for this encounter.      Dialysis/Infusion      Coordination has not been started for this encounter.      Home Medical Care - Selection Complete      Service Provider Request Status Selected Services Address Phone Number Fax Number    [facility] [address] [place] [address] [phone] --      Therapy      Coordination has not been started for this encounter.      Community Resources      Coordination has not been started for this encounter.        Expected Discharge Date and Time     Expected Discharge Date Expected Discharge Time    Aug 7, 2020         Demographic Summary    No documentation.       Functional Status    No documentation.       Psychosocial    No documentation.       Abuse/Neglect    No documentation.       Legal    No documentation.       Substance Abuse    No documentation.       Patient Forms    No documentation.           Nan Fuller RN

## 2020-08-07 NOTE — PLAN OF CARE
Problem: Patient Care Overview  Goal: Plan of Care Review  Outcome: Ongoing (interventions implemented as appropriate)  Flowsheets (Taken 8/6/2020 1511 by Aime Angeles, SWETA)  Progress: improving  Plan of Care Reviewed With: patient  Outcome Summary: Pt reports feeling better than in the AM. Pt was able to tolerate ambulation 90' cga with rw. Pt was left uic reclined with call light at hand.

## 2020-08-08 NOTE — PROGRESS NOTES
Case Management Discharge Note           Provided Post Acute Provider List?: Refused  Provided Post Acute Provider Quality & Resource List?: Refused    Destination      No service has been selected for the patient.      Durable Medical Equipment      No service has been selected for the patient.      Dialysis/Infusion      No service has been selected for the patient.      Home Medical Care - Selection Complete      Service Provider Request Status Selected Services Address Phone Number Fax Number    [facility] [address] [place], [place] [id number] [phone] --      Therapy      No service has been selected for the patient.      Community Resources      No service has been selected for the patient.             Final Discharge Disposition Code: 06 - home with home health care

## 2020-08-17 ENCOUNTER — OFFICE VISIT (OUTPATIENT)
Dept: ORTHOPEDIC SURGERY | Facility: CLINIC | Age: 70
End: 2020-08-17

## 2020-08-17 VITALS — HEIGHT: 62 IN | WEIGHT: 219 LBS | BODY MASS INDEX: 40.3 KG/M2 | RESPIRATION RATE: 18 BRPM

## 2020-08-17 DIAGNOSIS — R11.0 NAUSEA: ICD-10-CM

## 2020-08-17 DIAGNOSIS — Z96.641 S/P HIP REPLACEMENT, RIGHT: Primary | ICD-10-CM

## 2020-08-17 PROCEDURE — 99024 POSTOP FOLLOW-UP VISIT: CPT | Performed by: PHYSICIAN ASSISTANT

## 2020-08-17 RX ORDER — FAMOTIDINE 20 MG/1
20 TABLET, FILM COATED ORAL 2 TIMES DAILY
Qty: 14 TABLET | Refills: 0 | Status: SHIPPED | OUTPATIENT
Start: 2020-08-17 | End: 2021-08-30

## 2020-08-17 RX ORDER — PROMETHAZINE HYDROCHLORIDE 12.5 MG/1
12.5 TABLET ORAL EVERY 12 HOURS PRN
Qty: 14 TABLET | Refills: 0 | Status: SHIPPED | OUTPATIENT
Start: 2020-08-17 | End: 2021-08-30

## 2020-08-17 RX ORDER — ONDANSETRON 8 MG/1
8 TABLET, ORALLY DISINTEGRATING ORAL EVERY 8 HOURS PRN
Qty: 15 TABLET | Refills: 0 | Status: SHIPPED | OUTPATIENT
Start: 2020-08-17 | End: 2021-08-30

## 2020-08-17 NOTE — PROGRESS NOTES
Subjective   Patient ID: Elli Edwards is a 69 y.o.  female is here today for a post-operative visit.  Post-op of the Right Hip (First post-op visit s/p right total hip arthroplasty. Patient states she is doing well, not taking pain med. )          CHIEF COMPLAINT:      History of Present Illness      Pain controlled: [] no   [x] yes   Medication refill requested: [x] no   [] yes    Patient compliant with instructions: [] no   [x] yes   Other: Reports good progress since surgery.  Patient denies chest pain or shortness of breath.  She is receiving in-home home health PT  Patient request a refill on the nausea medicine.      Past Medical History:   Diagnosis Date   • Arthritis    • Blisters of multiple sites     lower back   • Cataract     PT REPORTS BILATERAL EYES   • Colon polyp 12/03/2015   • Colon polyps 10/08/2019   • Depression    • Deviated septum     has to have breathe [employer] or tape on nose for surgery   • Diarrhea    • Diverticulosis    • Dry eyes    • Elevated cholesterol    • Frequency of urination    • GERD (gastroesophageal reflux disease)    • H/O cardiovascular stress test 10/07/2019    10+ years ago    • Hearing loss     Patient reported no use of hearing aids   • Hip pain     right hip   • Hypertension    • Hypothyroid    • Impaired functional mobility, balance, gait, and endurance    • Localized osteoarthritis of right knee    • Piercing     ears only   • Seasonal allergies     allergic to trees, grass/foxfire burns   • Tinnitus    • Wears glasses         Past Surgical History:   Procedure Laterality Date   • ADENOIDECTOMY     • CATARACT EXTRACTION Bilateral    • CATARACT EXTRACTION W/ INTRAOCULAR LENS IMPLANT Right 2/2/2017    Procedure: RIGHT CATARACT PHACO EXTRACTION WITH INTRAOCULAR LENS IMPLANT;  Surgeon: Miky Garza MD;  Location: [facility];  Service:    • CATARACT EXTRACTION W/ INTRAOCULAR LENS IMPLANT Left 2/16/2017    Procedure: LEFT CATARACT PHACO EXTRACTION WITH INTRAOCULAR LENS  "IMPLANT;  Surgeon: Miky Garza MD;  Location: [facility] OR;  Service:    • CHOLECYSTECTOMY N/A 8/6/2019    Procedure: CHOLECYSTECTOMY LAPAROSCOPIC;  Surgeon: Oliver Aguilar MD;  Location:  [facility] OR;  Service: General   • COLONOSCOPY  2016   • COLONOSCOPY N/A 10/8/2019    Procedure: COLONOSCOPY WITH POLYPECTOMY;  Surgeon: Lemuel Sagastume MD;  Location: [facility] ENDOSCOPY;  Service: Gastroenterology   • ENDOSCOPY     • LASER ABLATION      cervical   • MOUTH SURGERY      SEVERAL WISDOM TEETH EXTRACTED   • MOUTH SURGERY      ORAL IMPLANT   • TONSILLECTOMY      AT AGE 5   • TOTAL HIP ARTHROPLASTY Right 8/4/2020    Procedure: Total hip arthroplasty RIGHT (BIOMET);  Surgeon: Justice Peoples MD;  Location: [facility] OR;  Service: Orthopedics;  Laterality: Right;       Allergies   Allergen Reactions   • Codeine Nausea And Vomiting and Dizziness       Review of Systems   Constitutional: Negative for fever.   HENT: Negative for dental problem and voice change.    Eyes: Negative for visual disturbance.   Respiratory: Negative for shortness of breath.    Cardiovascular: Negative for chest pain.   Gastrointestinal: Negative for abdominal pain.   Genitourinary: Negative for dysuria.   Musculoskeletal: Positive for arthralgias. Negative for gait problem and joint swelling.   Skin: Negative for rash.   Neurological: Negative for speech difficulty.   Hematological: Does not bruise/bleed easily.   Psychiatric/Behavioral: Negative for confusion.     I have reviewed the medical and surgical history, family history, social history, medications, and/or allergies, and the review of systems of this report.    Objective   Resp 18   Ht 156.2 cm (61.5\")   Wt 99.3 kg (219 lb)   LMP  (LMP Unknown)   BMI 40.71 kg/m²       Signs of infection: [x] no                    [] yes   Drainage: [x] no                    [] yes   Incision: [x] healing well     []healed well   Motor exam intact: [] no                    [x] yes   Neurovascular exam " intact: [] no                    [x] yes   Signs of compartment syndrome: [x] no                    [] yes   Signs of DVT: [x] no                    [] yes   Other:      Physical Exam  Ortho Exam    Extremity DVT signs are negative on physical exam with negative Markie sign, no calf pain, no palpable cords and no skin tone change  Neurologic Exam    Assessment/Plan     Independent Review of Radiographic Studies:    No new imaging done today.    Laboratory and Other Studies:  No new results reviewed today.     Medical Decision Making:    Stable neurovascular exam.       Procedures     Elli was seen today for post-op.    Diagnoses and all orders for this visit:    S/P hip replacement, right    Nausea  -     ondansetron ODT (Zofran ODT) 8 MG disintegrating tablet; Place 1 tablet on the tongue Every 8 (Eight) Hours As Needed for Nausea or Vomiting.  -     promethazine (PHENERGAN) 12.5 MG tablet; Take 1 tablet by mouth Every 12 (Twelve) Hours As Needed for Nausea.  -     famotidine (Pepcid) 20 MG tablet; Take 1 tablet by mouth 2 (Two) Times a Day.         Recommendations/Plan:     Sutures Staples or Pins [x] Removed today  [] At prior visit  [] Plan removal later   Physical therapy: []rehab facility  []outpatient referral  [x] therapy ongoing   Ultrasound: [x]not ordered         []order given to patient   Labs: [x]not ordered         []order given to patient   Weight Bearing status: []Full [x]WBAT []PWB []NWB []Other     Follow up in 6 weeks    Patient is encouraged and agreeable to call or return sooner for any issues or concerns.

## 2020-09-01 ENCOUNTER — TELEPHONE (OUTPATIENT)
Dept: ORTHOPEDIC SURGERY | Facility: CLINIC | Age: 70
End: 2020-09-01

## 2020-09-01 NOTE — TELEPHONE ENCOUNTER
Patient LM on answering machine. Has questions about driving and sleepin on side. Tried to call back x2

## 2020-09-28 ENCOUNTER — OFFICE VISIT (OUTPATIENT)
Dept: ORTHOPEDIC SURGERY | Facility: CLINIC | Age: 70
End: 2020-09-28

## 2020-09-28 VITALS — WEIGHT: 202 LBS | BODY MASS INDEX: 37.17 KG/M2 | RESPIRATION RATE: 18 BRPM | HEIGHT: 62 IN

## 2020-09-28 DIAGNOSIS — Z96.641 S/P HIP REPLACEMENT, RIGHT: Primary | ICD-10-CM

## 2020-09-28 PROCEDURE — 99024 POSTOP FOLLOW-UP VISIT: CPT | Performed by: PHYSICIAN ASSISTANT

## 2020-09-28 NOTE — PROGRESS NOTES
Subjective   Patient ID: Elli Edwards is a 69 y.o. right hand dominant female is here today for a post-operative visit.  Post-op of the Right Hip ([facility] 8/4/20, reports she is progressing well, 2 more PT sessions)          CHIEF COMPLAINT:    History of Present Illness      Pain controlled: [] no   [x] yes   Medication refill requested: [x] no   [] yes    Patient compliant with instructions: [] no   [x] yes   Other: Reports good progress since surgery.     Past Medical History:   Diagnosis Date   • Arthritis    • Blisters of multiple sites     lower back   • Cataract     PT REPORTS BILATERAL EYES   • Colon polyp 12/03/2015   • Colon polyps 10/08/2019   • Depression    • Deviated septum     has to have breathe [employer] or tape on nose for surgery   • Diarrhea    • Diverticulosis    • Dry eyes    • Elevated cholesterol    • Frequency of urination    • GERD (gastroesophageal reflux disease)    • H/O cardiovascular stress test 10/07/2019    10+ years ago    • Hearing loss     Patient reported no use of hearing aids   • Hip pain     right hip   • Hypertension    • Hypothyroid    • Impaired functional mobility, balance, gait, and endurance    • Localized osteoarthritis of right knee    • Piercing     ears only   • Seasonal allergies     allergic to trees, grass/foxfire burns   • Tinnitus    • Wears glasses         Past Surgical History:   Procedure Laterality Date   • ADENOIDECTOMY     • CATARACT EXTRACTION Bilateral    • CATARACT EXTRACTION W/ INTRAOCULAR LENS IMPLANT Right 2/2/2017    Procedure: RIGHT CATARACT PHACO EXTRACTION WITH INTRAOCULAR LENS IMPLANT;  Surgeon: Miky Garza MD;  Location: [facility] OR;  Service:    • CATARACT EXTRACTION W/ INTRAOCULAR LENS IMPLANT Left 2/16/2017    Procedure: LEFT CATARACT PHACO EXTRACTION WITH INTRAOCULAR LENS IMPLANT;  Surgeon: Miky Garza MD;  Location: [facility] OR;  Service:    • CHOLECYSTECTOMY N/A 8/6/2019    Procedure: CHOLECYSTECTOMY LAPAROSCOPIC;  Surgeon: Oliver Aguilar  "MD;  Location: [facility] OR;  Service: General   • COLONOSCOPY  2016   • COLONOSCOPY N/A 10/8/2019    Procedure: COLONOSCOPY WITH POLYPECTOMY;  Surgeon: Lemuel Sagastume MD;  Location: [facility] ENDOSCOPY;  Service: Gastroenterology   • ENDOSCOPY     • LASER ABLATION      cervical   • MOUTH SURGERY      SEVERAL WISDOM TEETH EXTRACTED   • MOUTH SURGERY      ORAL IMPLANT   • TONSILLECTOMY      AT AGE 5   • TOTAL HIP ARTHROPLASTY Right 8/4/2020    Procedure: Total hip arthroplasty RIGHT (BIOMET);  Surgeon: Justice Peoples MD;  Location: [facility] OR;  Service: Orthopedics;  Laterality: Right;       Allergies   Allergen Reactions   • Codeine Nausea And Vomiting and Dizziness       Review of Systems   Constitutional: Negative for diaphoresis, fever and unexpected weight change.   HENT: Negative for dental problem and sore throat.    Eyes: Negative for visual disturbance.   Respiratory: Negative for shortness of breath.    Cardiovascular: Negative for chest pain.   Gastrointestinal: Negative for abdominal pain, constipation, diarrhea, nausea and vomiting.   Genitourinary: Negative for difficulty urinating and frequency.   Neurological: Negative for headaches.   Hematological: Does not bruise/bleed easily.     I have reviewed the medical and surgical history, family history, social history, medications, and/or allergies, and the review of systems of this report.    Objective   Resp 18   Ht 156.2 cm (61.5\")   Wt 91.6 kg (202 lb)   LMP  (LMP Unknown)   BMI 37.55 kg/m²       Signs of infection: [x] no                    [] yes   Drainage: [x] no                    [] yes   Incision: [x] healing well     []healed well   Motor exam intact: [] no                    [x] yes   Neurovascular exam intact: [] no                    [x] yes   Signs of compartment syndrome: [x] no                    [] yes   Signs of DVT: [x] no                    [] yes   Other:      Physical Exam  Ortho Exam    Extremity DVT signs are negative on " physical exam with negative Markie sign, no calf pain, no palpable cords and no skin tone change  Neurologic Exam    Assessment/Plan     Independent Review of Radiographic Studies:    No new imaging done today.    Laboratory and Other Studies:  No new results reviewed today.     Medical Decision Making:    Stable neurovascular exam.     Procedures     Elli was seen today for post-op.    Diagnoses and all orders for this visit:    S/P hip replacement, right         Recommendations/Plan:     Sutures Staples or Pins [] Removed today  [x] At prior visit  [] Plan removal later   Physical therapy: []rehab facility  []outpatient referral  [x] therapy ongoing   Ultrasound: [x]not ordered         []order given to patient   Labs: [x]not ordered         []order given to patient   Weight Bearing status: []Full [x]WBAT []PWB []NWB []Other     Regular exercise as tolerated  Guided on proper techniques for mobility, strength, agility and/or conditioning exercises  Physical therapy program ongoing  Aftercare and dental prophylaxis for joint replacement surgery.       Patient is encouraged and agreeable to call or return sooner for any issues or concerns.

## 2020-10-12 ENCOUNTER — TRANSCRIBE ORDERS (OUTPATIENT)
Dept: ADMINISTRATIVE | Facility: HOSPITAL | Age: 70
End: 2020-10-12

## 2020-10-12 DIAGNOSIS — Z12.31 VISIT FOR SCREENING MAMMOGRAM: Primary | ICD-10-CM

## 2020-10-16 ENCOUNTER — HOSPITAL ENCOUNTER (OUTPATIENT)
Dept: MAMMOGRAPHY | Facility: HOSPITAL | Age: 70
Discharge: HOME | End: 2020-10-16
Admitting: INTERNAL MEDICINE

## 2020-10-16 DIAGNOSIS — Z12.31 VISIT FOR SCREENING MAMMOGRAM: ICD-10-CM

## 2020-10-16 PROCEDURE — 77063 BREAST TOMOSYNTHESIS BI: CPT

## 2020-10-16 PROCEDURE — 77067 SCR MAMMO BI INCL CAD: CPT | Performed by: RADIOLOGY

## 2020-10-16 PROCEDURE — 77063 BREAST TOMOSYNTHESIS BI: CPT | Performed by: RADIOLOGY

## 2020-10-16 PROCEDURE — 77067 SCR MAMMO BI INCL CAD: CPT

## 2020-12-03 ENCOUNTER — TELEPHONE (OUTPATIENT)
Dept: ORTHOPEDICS | Facility: OTHER | Age: 70
End: 2020-12-03

## 2020-12-03 NOTE — TELEPHONE ENCOUNTER
Caller: 'S OFFICE     Relationship: DENTIST    Best call back number: [phone]  What form or medical record are you requesting: STATEMENT FROM DR. SEE STATING HIS CRITERIA FOR ANTIBIOTICS.     Who is requesting this form or medical record from you: DENTIST     Additional notes: PT.'S DENTIST OFFICE CALLED. PT. WAS SEEN TODAY.   SHE HAD RIGHT HIP REPLACEMENT 08/04/20.  PT. WAS UNSURE AND DENTIST OFFICE- ABOUT DR. SEE'S CRITERIA ON ANTIBIOTICS BEFORE AND AFTER DENTAL VISITS.   THEY DID GIVE PT. RX. FOR AMOXIL.   OFFICE IS REQUESTING A STATEMENT FROM DR. SEE STATING THIS.   PLEASE EMAIL TO   [facility] [email or web address]  PLEASE CALL OFFICE WITH ANY QUESTIONS-+ [phone]

## 2021-01-28 ENCOUNTER — OFFICE VISIT (OUTPATIENT)
Dept: ORTHOPEDIC SURGERY | Facility: CLINIC | Age: 71
End: 2021-01-28

## 2021-01-28 VITALS — HEIGHT: 62 IN | BODY MASS INDEX: 37.36 KG/M2 | TEMPERATURE: 96.9 F | WEIGHT: 203 LBS

## 2021-01-28 DIAGNOSIS — Z96.641 S/P HIP REPLACEMENT, RIGHT: Primary | ICD-10-CM

## 2021-01-28 PROCEDURE — 99212 OFFICE O/P EST SF 10 MIN: CPT | Performed by: PHYSICIAN ASSISTANT

## 2021-01-28 NOTE — PROGRESS NOTES
Subjective   Patient ID: Elli Edwards is a 70 y.o. female  Follow-up of the Right Hip (Follow up s/p right total hip replacement on 8/4/20. Patient states she is doing well. She finished formal PT, does HEP)         History of Present Illness  Patient presents for her scheduled follow-up visit regarding right total hip arthroplasty.  Date of surgery 8/4/2020.  Patient states she is very pleased with her progress.  She denies having any pain to the right hip.  Past Medical History:   Diagnosis Date   • Arthritis    • Blisters of multiple sites     lower back   • Cataract     PT REPORTS BILATERAL EYES   • Colon polyp 12/03/2015   • Colon polyps 10/08/2019   • Depression    • Deviated septum     has to have breathe [employer] or tape on nose for surgery   • Diarrhea    • Diverticulosis    • Dry eyes    • Elevated cholesterol    • Frequency of urination    • GERD (gastroesophageal reflux disease)    • H/O cardiovascular stress test 10/07/2019    10+ years ago    • Hearing loss     Patient reported no use of hearing aids   • Hip pain     right hip   • Hypertension    • Hypothyroid    • Impaired functional mobility, balance, gait, and endurance    • Localized osteoarthritis of right knee    • Piercing     ears only   • Seasonal allergies     allergic to trees, grass/foxfire burns   • Tinnitus    • Wears glasses         Past Surgical History:   Procedure Laterality Date   • ADENOIDECTOMY     • CATARACT EXTRACTION Bilateral    • CATARACT EXTRACTION W/ INTRAOCULAR LENS IMPLANT Right 2/2/2017    Procedure: RIGHT CATARACT PHACO EXTRACTION WITH INTRAOCULAR LENS IMPLANT;  Surgeon: Miky Garza MD;  Location: [facility] OR;  Service:    • CATARACT EXTRACTION W/ INTRAOCULAR LENS IMPLANT Left 2/16/2017    Procedure: LEFT CATARACT PHACO EXTRACTION WITH INTRAOCULAR LENS IMPLANT;  Surgeon: Miky Garza MD;  Location: [facility] OR;  Service:    • CHOLECYSTECTOMY N/A 8/6/2019    Procedure: CHOLECYSTECTOMY LAPAROSCOPIC;  Surgeon: Jeff  MD Oliver;  Location: [facility] OR;  Service: General   • COLONOSCOPY  2016   • COLONOSCOPY N/A 10/8/2019    Procedure: COLONOSCOPY WITH POLYPECTOMY;  Surgeon: Lemuel Sagastume MD;  Location: [facility] ENDOSCOPY;  Service: Gastroenterology   • ENDOSCOPY     • LASER ABLATION      cervical   • MOUTH SURGERY      SEVERAL WISDOM TEETH EXTRACTED   • MOUTH SURGERY      ORAL IMPLANT   • TONSILLECTOMY      AT AGE 5   • TOTAL HIP ARTHROPLASTY Right 8/4/2020    Procedure: Total hip arthroplasty RIGHT (BIOMET);  Surgeon: Justice Peoples MD;  Location: [facility] OR;  Service: Orthopedics;  Laterality: Right;       Family History   Problem Relation Age of Onset   • Prostate cancer Father    • Colon cancer Paternal Grandmother    • Hyperlipidemia Other    • Thyroid cancer Mother    • Breast cancer Neg Hx    • Ovarian cancer Neg Hx        Social History     Socioeconomic History   • Marital status:      Spouse name: Not on file   • Number of children: Not on file   • Years of education: Not on file   • Highest education level: Not on file   Occupational History   • Occupation: retired     Employer: [employer]     Comment: worked for [employer] office in [facility]   Tobacco Use   • Smoking status: Never Smoker   • Smokeless tobacco: Never Used   Substance and Sexual Activity   • Alcohol use: No     Comment: RARELY DRINKS, 2-3 X MONTHLY   • Drug use: No   • Sexual activity: Defer   Social History Narrative    Right hand dominant         Current Outpatient Medications:   •  atorvastatin (LIPITOR) 40 MG tablet, Take 40 mg by mouth Daily., Disp: , Rfl:   •  calcium carbonate (OS-ZEESHAN) 600 MG tablet, Take 600 mg by mouth Daily., Disp: , Rfl:   •  cholecalciferol (VITAMIN D3) 1000 units tablet, Take 1,000 Units by mouth Daily., Disp: , Rfl:   •  docusate sodium (COLACE) 250 MG capsule, Take 1 capsule by mouth Daily., Disp: 14 capsule, Rfl: 0  •  famotidine (Pepcid) 20 MG tablet, Take 1 tablet by mouth 2 (Two) Times a Day., Disp: 14  "tablet, Rfl: 0  •  lisinopril (PRINIVIL,ZESTRIL) 20 MG tablet, Take 20 mg by mouth Daily., Disp: , Rfl:   •  ondansetron ODT (Zofran ODT) 8 MG disintegrating tablet, Place 1 tablet on the tongue Every 8 (Eight) Hours As Needed for Nausea or Vomiting., Disp: 15 tablet, Rfl: 0  •  promethazine (PHENERGAN) 12.5 MG tablet, Take 1 tablet by mouth Every 12 (Twelve) Hours As Needed for Nausea., Disp: 14 tablet, Rfl: 0  •  sertraline (ZOLOFT) 100 MG tablet, Take 100 mg by mouth Daily., Disp: , Rfl:     Allergies   Allergen Reactions   • Codeine Nausea And Vomiting and Dizziness       Review of Systems   Constitutional: Negative for fever.   HENT: Negative for dental problem and voice change.    Eyes: Negative for visual disturbance.   Respiratory: Negative for shortness of breath.    Cardiovascular: Negative for chest pain.   Gastrointestinal: Negative for abdominal pain.   Genitourinary: Negative for dysuria.   Musculoskeletal: Negative for arthralgias, gait problem and joint swelling.   Skin: Negative for rash.   Neurological: Negative for speech difficulty.   Hematological: Does not bruise/bleed easily.   Psychiatric/Behavioral: Negative for confusion.        I have reviewed the medical and surgical history, family history, social history, medications, and/or allergies, and the review of systems of this report.    Objective   Temp 96.9 °F (36.1 °C)   Ht 156.2 cm (61.5\")   Wt 92.1 kg (203 lb)   LMP  (LMP Unknown)   BMI 37.74 kg/m²    Physical Exam  Vitals signs and nursing note reviewed.   Constitutional:       Appearance: Normal appearance.   Pulmonary:      Effort: Pulmonary effort is normal.   Musculoskeletal:      Right hip: She exhibits normal range of motion, normal strength, no tenderness, no bony tenderness, no swelling, no crepitus and no deformity.   Neurological:      Mental Status: She is alert and oriented to person, place, and time.       Ortho Exam   Extremity DVT signs are negative on physical exam " with negative Markie sign, no calf pain, no palpable cords and no skin tone change   Neurologic Exam     Mental Status   Oriented to person, place, and time.               Assessment/Plan   Independent Review of Radiographic Studies:    No new imaging done today.      Procedures       Diagnoses and all orders for this visit:    1. S/P hip replacement, right (Primary)       Orthopedic activities reviewed and patient expressed appreciation  Discussion of orthopedic goals  Risk, benefits, and merits of treatment alternatives reviewed with the patient and questions answered    Recommendations/Plan:  Exercise, medications, injections, other patient advice, and return appointment as noted.  Patient is encouraged to call or return for any issues or concerns.    Follow-up in 8/2021 x-ray on arrival  Patient agreeable to call or return sooner for any concerns.             EMR Dragon-transcription disclaimer:  This encounter note is an electronic transcription of spoken language to printed text.  Electronic transcription of spoken language may permit erroneous or at times nonsensical words or phrases to be inadvertently transcribed.  Although I have reviewed the note for such errors, some may still exist

## 2021-03-11 ENCOUNTER — IMMUNIZATION (OUTPATIENT)
Dept: VACCINE CLINIC | Facility: HOSPITAL | Age: 71
End: 2021-03-11

## 2021-03-11 PROCEDURE — 91300 HC SARSCOV02 VAC 30MCG/0.3ML IM: CPT | Performed by: INTERNAL MEDICINE

## 2021-03-11 PROCEDURE — 0001A: CPT | Performed by: INTERNAL MEDICINE

## 2021-04-01 ENCOUNTER — IMMUNIZATION (OUTPATIENT)
Dept: VACCINE CLINIC | Facility: HOSPITAL | Age: 71
End: 2021-04-01

## 2021-04-01 PROCEDURE — 91300 HC SARSCOV02 VAC 30MCG/0.3ML IM: CPT | Performed by: INTERNAL MEDICINE

## 2021-04-01 PROCEDURE — 0002A: CPT | Performed by: INTERNAL MEDICINE

## 2021-08-30 ENCOUNTER — OFFICE VISIT (OUTPATIENT)
Dept: ORTHOPEDIC SURGERY | Facility: CLINIC | Age: 71
End: 2021-08-30

## 2021-08-30 VITALS
SYSTOLIC BLOOD PRESSURE: 140 MMHG | BODY MASS INDEX: 38.71 KG/M2 | TEMPERATURE: 97.3 F | WEIGHT: 205 LBS | DIASTOLIC BLOOD PRESSURE: 92 MMHG | HEIGHT: 61 IN | RESPIRATION RATE: 18 BRPM

## 2021-08-30 DIAGNOSIS — Z96.641 S/P HIP REPLACEMENT, RIGHT: Primary | ICD-10-CM

## 2021-08-30 PROCEDURE — 99212 OFFICE O/P EST SF 10 MIN: CPT | Performed by: PHYSICIAN ASSISTANT

## 2021-08-30 NOTE — PROGRESS NOTES
Subjective   Patient ID: Elli Edwards is a 70 y.o. right hand dominant female  Follow-up of the Right Hip (OLI 8/4/20. )         History of Present Illness  Patient is following up for scheduled appointment regarding right total hip arthroplasty.  Date of surgery 8/4/2020.  Patient is very pleased with her progress.  She has no pain.  She is able to perform her ADLs without discomfort.                                                 Past Medical History:   Diagnosis Date   • Arthritis    • Blisters of multiple sites     lower back   • Cataract     PT REPORTS BILATERAL EYES   • Colon polyp 12/03/2015   • Colon polyps 10/08/2019   • Depression    • Deviated septum     has to have breathe [employer] or tape on nose for surgery   • Diarrhea    • Diverticulosis    • Dry eyes    • Elevated cholesterol    • Frequency of urination    • GERD (gastroesophageal reflux disease)    • H/O cardiovascular stress test 10/07/2019    10+ years ago    • Hearing loss     Patient reported no use of hearing aids   • Hip pain     right hip   • Hypertension    • Hypothyroid    • Impaired functional mobility, balance, gait, and endurance    • Localized osteoarthritis of right knee    • Piercing     ears only   • Seasonal allergies     allergic to trees, grass/foxfire burns   • Tinnitus    • Wears glasses         Past Surgical History:   Procedure Laterality Date   • ADENOIDECTOMY     • CATARACT EXTRACTION Bilateral    • CATARACT EXTRACTION W/ INTRAOCULAR LENS IMPLANT Right 2/2/2017    Procedure: RIGHT CATARACT PHACO EXTRACTION WITH INTRAOCULAR LENS IMPLANT;  Surgeon: Miky Garza MD;  Location: [facility] OR;  Service:    • CATARACT EXTRACTION W/ INTRAOCULAR LENS IMPLANT Left 2/16/2017    Procedure: LEFT CATARACT PHACO EXTRACTION WITH INTRAOCULAR LENS IMPLANT;  Surgeon: Miky Garza MD;  Location: [facility] OR;  Service:    • CHOLECYSTECTOMY N/A 8/6/2019    Procedure: CHOLECYSTECTOMY LAPAROSCOPIC;  Surgeon: Oliver Aguilar MD;  Location:  [facility]  OR;  Service: General   • COLONOSCOPY  2016   • COLONOSCOPY N/A 10/8/2019    Procedure: COLONOSCOPY WITH POLYPECTOMY;  Surgeon: Lemuel Sagastume MD;  Location: [facility] ENDOSCOPY;  Service: Gastroenterology   • ENDOSCOPY     • LASER ABLATION      cervical   • MOUTH SURGERY      SEVERAL WISDOM TEETH EXTRACTED   • MOUTH SURGERY      ORAL IMPLANT   • TONSILLECTOMY      AT AGE 5   • TOTAL HIP ARTHROPLASTY Right 8/4/2020    Procedure: Total hip arthroplasty RIGHT (BIOMET);  Surgeon: Justice Peoples MD;  Location: [facility] OR;  Service: Orthopedics;  Laterality: Right;       Family History   Problem Relation Age of Onset   • Prostate cancer Father    • Colon cancer Paternal Grandmother    • Hyperlipidemia Other    • Thyroid cancer Mother    • Breast cancer Neg Hx    • Ovarian cancer Neg Hx        Social History     Socioeconomic History   • Marital status:      Spouse name: Not on file   • Number of children: Not on file   • Years of education: Not on file   • Highest education level: Not on file   Tobacco Use   • Smoking status: Never Smoker   • Smokeless tobacco: Never Used   Substance and Sexual Activity   • Alcohol use: No     Comment: RARELY DRINKS, 2-3 X MONTHLY   • Drug use: No   • Sexual activity: Defer         Current Outpatient Medications:   •  atorvastatin (LIPITOR) 40 MG tablet, Take 40 mg by mouth Daily., Disp: , Rfl:   •  calcium carbonate (OS-ZEESHAN) 600 MG tablet, Take 600 mg by mouth Daily., Disp: , Rfl:   •  cholecalciferol (VITAMIN D3) 1000 units tablet, Take 1,000 Units by mouth Daily., Disp: , Rfl:   •  docusate sodium (COLACE) 250 MG capsule, Take 1 capsule by mouth Daily., Disp: 14 capsule, Rfl: 0  •  lisinopril (PRINIVIL,ZESTRIL) 20 MG tablet, Take 20 mg by mouth Daily., Disp: , Rfl:   •  sertraline (ZOLOFT) 100 MG tablet, Take 100 mg by mouth Daily., Disp: , Rfl:     Allergies   Allergen Reactions   • Codeine Nausea And Vomiting and Dizziness       Review of Systems   Constitutional: Negative  "for fever.   HENT: Negative for dental problem and voice change.    Eyes: Negative for visual disturbance.   Respiratory: Negative for shortness of breath.    Cardiovascular: Negative for chest pain.   Gastrointestinal: Negative for abdominal pain.   Genitourinary: Negative for dysuria.   Musculoskeletal: Negative for arthralgias, gait problem and joint swelling.   Skin: Negative for rash.   Neurological: Negative for speech difficulty.   Hematological: Does not bruise/bleed easily.   Psychiatric/Behavioral: Negative for confusion.       I have reviewed the medical and surgical history, family history, social history, medications, and/or allergies, and the review of systems of this report.    Objective   /92 (BP Location: Right arm, Patient Position: Sitting, Cuff Size: Adult)   Temp 97.3 °F (36.3 °C)   Resp 18   Ht 156.2 cm (61.5\")   Wt 93 kg (205 lb)   LMP  (LMP Unknown)   BMI 38.11 kg/m²    Physical Exam  Vitals and nursing note reviewed.   Constitutional:       Appearance: Normal appearance.   Pulmonary:      Effort: Pulmonary effort is normal.   Musculoskeletal:      Right hip: No deformity, tenderness, bony tenderness or crepitus. Normal range of motion. Normal strength.      Right upper leg: Normal.   Neurological:      Mental Status: She is alert and oriented to person, place, and time.       Right Hip Exam     Range of Motion   Abduction: 35   Flexion: 90   External rotation: 60     Muscle Strength   The patient has normal right hip strength.    Other   Sensation: normal  Pulse: present           Extremity DVT signs are negative by clinical screen.   Neurologic Exam     Mental Status   Oriented to person, place, and time.              Assessment/Plan   Independent Review of Radiographic Studies:    AP and lateral of the right hip, indication to evaluate status of prosthesis and joint, and compared with prior imaging, shows no acute fracture or dislocation. Good position and alignment of " prosthesis with no radiographic signs of loosening.       Procedures       Diagnoses and all orders for this visit:    1. S/P hip replacement, right (Primary)  -     XR Hip With or Without Pelvis 2 - 3 View Right; Future       Orthopedic activities reviewed and patient expressed appreciation  Discussion of orthopedic goals  Risk, benefits, and merits of treatment alternatives reviewed with the patient and questions answered  Ice, heat, and/or modalities as beneficial    Recommendations/Plan:  Exercise, medications, injections, other patient advice, and return appointment as noted.  Patient is encouraged to call or return for any issues or concerns.  Follow-up as needed regarding right hip  Patient agreeable to call or return sooner for any concerns.               EMR Dragon-transcription disclaimer:  This encounter note is an electronic transcription of spoken language to printed text.  Electronic transcription of spoken language may permit erroneous or at times nonsensical words or phrases to be inadvertently transcribed.  Although I have reviewed the note for such errors, some may still exist

## 2021-10-11 ENCOUNTER — TRANSCRIBE ORDERS (OUTPATIENT)
Dept: ADMINISTRATIVE | Facility: HOSPITAL | Age: 71
End: 2021-10-11

## 2021-10-11 DIAGNOSIS — Z12.31 VISIT FOR SCREENING MAMMOGRAM: Primary | ICD-10-CM

## 2021-10-15 NOTE — PLAN OF CARE
Problem: Patient Care Overview  Goal: Plan of Care Review  8/7/2020 1144 by Aime Angeles, PTA  Flowsheets (Taken 8/7/2020 1014)  Outcome Summary: Pt tolerated treatment well.  Pt attempted to perform bed mobility with hospital bed adjusted to heights of two available beds pt has at home.  Pt was unable to enter either heights safely and maintain THP. Case managememnt was called and informed and would look into hospital bed. Pt also reports could get a day bed temporarily.Pt was able to ambulate 144' cga/sba with rw.  Pt reports nurse had given meds for nausea and was feeling much better. See flowsheet for details.        No

## 2021-12-13 ENCOUNTER — APPOINTMENT (OUTPATIENT)
Dept: MAMMOGRAPHY | Facility: HOSPITAL | Age: 71
End: 2021-12-13

## 2021-12-13 ENCOUNTER — HOSPITAL ENCOUNTER (OUTPATIENT)
Dept: MAMMOGRAPHY | Facility: HOSPITAL | Age: 71
Discharge: HOME | End: 2021-12-13
Admitting: INTERNAL MEDICINE

## 2021-12-13 DIAGNOSIS — Z12.31 VISIT FOR SCREENING MAMMOGRAM: ICD-10-CM

## 2021-12-13 PROCEDURE — 77067 SCR MAMMO BI INCL CAD: CPT

## 2021-12-13 PROCEDURE — 77063 BREAST TOMOSYNTHESIS BI: CPT

## 2021-12-13 PROCEDURE — 77067 SCR MAMMO BI INCL CAD: CPT | Performed by: RADIOLOGY

## 2021-12-13 PROCEDURE — 77063 BREAST TOMOSYNTHESIS BI: CPT | Performed by: RADIOLOGY

## 2022-08-18 ENCOUNTER — OFFICE VISIT (OUTPATIENT)
Dept: GASTROENTEROLOGY | Facility: CLINIC | Age: 72
End: 2022-08-18

## 2022-08-18 VITALS
WEIGHT: 194 LBS | DIASTOLIC BLOOD PRESSURE: 78 MMHG | BODY MASS INDEX: 35.7 KG/M2 | HEIGHT: 62 IN | SYSTOLIC BLOOD PRESSURE: 124 MMHG | TEMPERATURE: 97.7 F

## 2022-08-18 DIAGNOSIS — Z86.010 PERSONAL HISTORY OF COLONIC POLYPS: Primary | ICD-10-CM

## 2022-08-18 DIAGNOSIS — K52.832 LYMPHOCYTIC COLITIS: ICD-10-CM

## 2022-08-18 DIAGNOSIS — R19.7 DIARRHEA, UNSPECIFIED TYPE: ICD-10-CM

## 2022-08-18 PROCEDURE — 99213 OFFICE O/P EST LOW 20 MIN: CPT | Performed by: NURSE PRACTITIONER

## 2022-08-18 RX ORDER — METOPROLOL SUCCINATE 25 MG/1
25 TABLET, EXTENDED RELEASE ORAL DAILY
COMMUNITY
Start: 2022-07-05 | End: 2023-02-02 | Stop reason: SDUPTHER

## 2022-08-18 RX ORDER — SODIUM CHLORIDE 9 MG/ML
70 INJECTION, SOLUTION INTRAVENOUS CONTINUOUS PRN
Status: CANCELLED | OUTPATIENT
Start: 2022-08-18

## 2022-08-18 RX ORDER — SODIUM, POTASSIUM,MAG SULFATES 17.5-3.13G
SOLUTION, RECONSTITUTED, ORAL ORAL
Qty: 177 ML | Refills: 0 | Status: SHIPPED | OUTPATIENT
Start: 2022-08-18 | End: 2022-11-01 | Stop reason: HOSPADM

## 2022-08-18 RX ORDER — CHLORTHALIDONE 25 MG/1
25 TABLET ORAL DAILY
COMMUNITY
Start: 2022-08-01

## 2022-08-18 NOTE — PROGRESS NOTES
Follow Up Note     Date: 2022   Patient Name: Elli Edwards  MRN: [id number]  : 1950     Primary Care Provider: Elli Trinh MD     Chief Complaint   Patient presents with   • Follow-up   • Colon Cancer Screening     History of present illness:   2022  Elli Edwards is a 71 y.o. female who is here today for follow up for colon cancer screening.     Her last colonoscopy was in 2019 with several polyps removed. Her grandmother had colon cancer. She has a history of diarrhea off and on for many years. She may have diarrhea once per month with 3-4 episodes per day. Denies abdominal pain. No rectal bleeding. No reflux or difficulty swallowing. Denies nausea or vomiting.     Interval History:  2019  The patient is here for follow up to discuss colonoscopy results. She has been doing well. The patient denies recent change in bowel habits. There is no diarrhea or constipation. There is no history of abdominal pain. There is no history of overt GI bleed (hematemesis melena or hematochezia). The patient denies nausea or vomiting. There is no history of reflux. The patient denies dysphagia or odynophagia. There is no history of recent significant weight loss. There is no history of liver disease in the past.    Subjective      Past Medical History:   Diagnosis Date   • Arthritis    • Blisters of multiple sites     lower back   • Cataract     PT REPORTS BILATERAL EYES   • Colon polyp 2015   • Colon polyps 10/08/2019   • Depression    • Deviated septum     has to have breathe [employer] or tape on nose for surgery   • Diarrhea    • Diverticulosis    • Dry eyes    • Elevated cholesterol    • Frequency of urination    • GERD (gastroesophageal reflux disease)    • H/O cardiovascular stress test 10/07/2019    10+ years ago    • Hearing loss     Patient reported no use of hearing aids   • Hip pain     right hip   • Hypertension    • Hypothyroid    • Impaired functional mobility, balance,  gait, and endurance    • Localized osteoarthritis of right knee    • Piercing     ears only   • Seasonal allergies     allergic to trees, grass/foxfire burns   • Tinnitus    • Wears glasses      Past Surgical History:   Procedure Laterality Date   • ADENOIDECTOMY     • CATARACT EXTRACTION Bilateral    • CATARACT EXTRACTION W/ INTRAOCULAR LENS IMPLANT Right 2/2/2017    Procedure: RIGHT CATARACT PHACO EXTRACTION WITH INTRAOCULAR LENS IMPLANT;  Surgeon: Miky Garza MD;  Location: [facility] OR;  Service:    • CATARACT EXTRACTION W/ INTRAOCULAR LENS IMPLANT Left 2/16/2017    Procedure: LEFT CATARACT PHACO EXTRACTION WITH INTRAOCULAR LENS IMPLANT;  Surgeon: Miky Garza MD;  Location: [facility] OR;  Service:    • CHOLECYSTECTOMY N/A 8/6/2019    Procedure: CHOLECYSTECTOMY LAPAROSCOPIC;  Surgeon: Oliver Aguilar MD;  Location: [facility] OR;  Service: General   • COLONOSCOPY  2016   • COLONOSCOPY N/A 10/8/2019    Procedure: COLONOSCOPY WITH POLYPECTOMY;  Surgeon: Lemuel Sagastume MD;  Location: [facility] ENDOSCOPY;  Service: Gastroenterology   • ENDOSCOPY     • LASER ABLATION      cervical   • MOUTH SURGERY      SEVERAL WISDOM TEETH EXTRACTED   • MOUTH SURGERY      ORAL IMPLANT   • TONSILLECTOMY      AT AGE 5   • TOTAL HIP ARTHROPLASTY Right 8/4/2020    Procedure: Total hip arthroplasty RIGHT (BIOMET);  Surgeon: Justice Peoples MD;  Location: [facility] OR;  Service: Orthopedics;  Laterality: Right;     Family History   Problem Relation Age of Onset   • Prostate cancer Father    • Colon cancer Paternal Grandmother    • Hyperlipidemia Other    • Thyroid cancer Mother    • Breast cancer Neg Hx    • Ovarian cancer Neg Hx      Social History     Socioeconomic History   • Marital status:    Tobacco Use   • Smoking status: Never Smoker   • Smokeless tobacco: Never Used   Vaping Use   • Vaping Use: Never used   Substance and Sexual Activity   • Alcohol use: No     Comment: RARELY DRINKS, 2-3 X MONTHLY   • Drug use: No   • Sexual  activity: Defer       Current Outpatient Medications:   •  atorvastatin (LIPITOR) 40 MG tablet, Take 40 mg by mouth Daily., Disp: , Rfl:   •  calcium carbonate (OS-ZEESHAN) 600 MG tablet, Take 600 mg by mouth Daily., Disp: , Rfl:   •  chlorthalidone (HYGROTON) 25 MG tablet, Take 25 mg by mouth Daily., Disp: , Rfl:   •  cholecalciferol (VITAMIN D3) 1000 units tablet, Take 1,000 Units by mouth Daily., Disp: , Rfl:   •  lisinopril (PRINIVIL,ZESTRIL) 20 MG tablet, Take 20 mg by mouth Daily., Disp: , Rfl:   •  metoprolol succinate XL (TOPROL-XL) 25 MG 24 hr tablet, Take 25 mg by mouth Daily., Disp: , Rfl:   •  sertraline (ZOLOFT) 100 MG tablet, Take 100 mg by mouth Daily., Disp: , Rfl:   •  sodium-potassium-magnesium sulfates (Suprep Bowel Prep Kit) 17.5-3.13-1.6 GM/177ML solution oral solution, Use as directed for colonoscopy prep. Patient has instructions., Disp: 177 mL, Rfl: 0     Allergies   Allergen Reactions   • Codeine Nausea And Vomiting and Dizziness     The following portions of the patient's history were reviewed and updated as appropriate: allergies, current medications, past family history, past medical history, past social history, past surgical history and problem list.  Objective     Physical Exam  Vitals and nursing note reviewed.   Constitutional:       General: She is not in acute distress.     Appearance: Normal appearance. She is well-developed.   HENT:      Head: Normocephalic and atraumatic.      Mouth/Throat:      Mouth: Mucous membranes are not pale, not dry and not cyanotic.   Eyes:      General: Lids are normal.   Neck:      Trachea: Trachea normal.   Cardiovascular:      Rate and Rhythm: Normal rate.   Pulmonary:      Effort: Pulmonary effort is normal. No respiratory distress.      Breath sounds: Normal breath sounds.   Abdominal:      Tenderness: There is no abdominal tenderness.   Skin:     General: Skin is warm and dry.   Neurological:      Mental Status: She is alert and oriented to person,  "place, and time.   Psychiatric:         Mood and Affect: Mood normal.         Speech: Speech normal.         Behavior: Behavior normal. Behavior is cooperative.       Vitals:    08/18/22 1627   BP: 124/78   Temp: 97.7 °F (36.5 °C)   Weight: 88 kg (194 lb)   Height: 156.2 cm (61.5\")     Body mass index is 36.06 kg/m².     Results Review:   I reviewed the patient's new clinical results.    No visits with results within 90 Day(s) from this visit.   Latest known visit with results is:   Admission on 08/04/2020, Discharged on 08/07/2020   Component Date Value Ref Range Status   • ABO Type 08/04/2020 O   Final   • RH type 08/04/2020 Positive   Final   • Antibody Screen 08/04/2020 Negative   Final   • T&S Expiration Date 08/04/2020 8/7/2020 11:59:59 PM   Final   • Case Report 08/04/2020    Final                    Value:Surgical Pathology Report                         Case: [id number]                                  Authorizing Provider:  Justice Peoples MD     Collected:           08/04/2020 09:04 AM          Ordering Location:     [facility] OR Received:            08/04/2020 01:42 PM          Pathologist:           Dawood Taylor MD                                                             Specimen:    Hip, Right, Femoral Head                                                                  • Final Diagnosis 08/04/2020    Final                    Value:This result contains rich text formatting which cannot be displayed here.   • Glucose 08/05/2020 140 (A) 65 - 99 mg/dL Final   • BUN 08/05/2020 13  8 - 23 mg/dL Final   • Creatinine 08/05/2020 0.65  0.57 - 1.00 mg/dL Final   • Sodium 08/05/2020 138  136 - 145 mmol/L Final   • Potassium 08/05/2020 3.5  3.5 - 5.2 mmol/L Final   • Chloride 08/05/2020 96 (A) 98 - 107 mmol/L Final   • CO2 08/05/2020 31.4 (A) 22.0 - 29.0 mmol/L Final   • Calcium 08/05/2020 8.9  8.6 - 10.5 mg/dL Final   • eGFR Non African Amer 08/05/2020 90  >60 mL/min/1.73 Final   • " BUN/Creatinine Ratio 08/05/2020 20.0  7.0 - 25.0 Final   • Anion Gap 08/05/2020 10.6  5.0 - 15.0 mmol/L Final   • WBC 08/05/2020 14.77 (A) 3.40 - 10.80 10*3/mm3 Final   • RBC 08/05/2020 3.99  3.77 - 5.28 10*6/mm3 Final   • Hemoglobin 08/05/2020 12.0  12.0 - 15.9 g/dL Final   • Hematocrit 08/05/2020 36.5  34.0 - 46.6 % Final   • MCV 08/05/2020 91.5  79.0 - 97.0 fL Final   • MCH 08/05/2020 30.1  26.6 - 33.0 pg Final   • MCHC 08/05/2020 32.9  31.5 - 35.7 g/dL Final   • RDW 08/05/2020 12.8  12.3 - 15.4 % Final   • RDW-SD 08/05/2020 43.1  37.0 - 54.0 fl Final   • MPV 08/05/2020 11.5  6.0 - 12.0 fL Final   • Platelets 08/05/2020 228  140 - 450 10*3/mm3 Final   • Neutrophil % 08/05/2020 76.5 (A) 42.7 - 76.0 % Final   • Lymphocyte % 08/05/2020 10.2 (A) 19.6 - 45.3 % Final   • Monocyte % 08/05/2020 12.5 (A) 5.0 - 12.0 % Final   • Eosinophil % 08/05/2020 0.0 (A) 0.3 - 6.2 % Final   • Basophil % 08/05/2020 0.1  0.0 - 1.5 % Final   • Immature Grans % 08/05/2020 0.7 (A) 0.0 - 0.5 % Final   • Neutrophils, Absolute 08/05/2020 11.30 (A) 1.70 - 7.00 10*3/mm3 Final   • Lymphocytes, Absolute 08/05/2020 1.50  0.70 - 3.10 10*3/mm3 Final   • Monocytes, Absolute 08/05/2020 1.85 (A) 0.10 - 0.90 10*3/mm3 Final   • Eosinophils, Absolute 08/05/2020 0.00  0.00 - 0.40 10*3/mm3 Final   • Basophils, Absolute 08/05/2020 0.02  0.00 - 0.20 10*3/mm3 Final   • Immature Grans, Absolute 08/05/2020 0.10 (A) 0.00 - 0.05 10*3/mm3 Final   • nRBC 08/05/2020 0.0  0.0 - 0.2 /100 WBC Final   • Glucose 08/06/2020 117 (A) 65 - 99 mg/dL Final   • BUN 08/06/2020 16  8 - 23 mg/dL Final   • Creatinine 08/06/2020 0.85  0.57 - 1.00 mg/dL Final   • Sodium 08/06/2020 138  136 - 145 mmol/L Final   • Potassium 08/06/2020 3.3 (A) 3.5 - 5.2 mmol/L Final   • Chloride 08/06/2020 97 (A) 98 - 107 mmol/L Final   • CO2 08/06/2020 33.6 (A) 22.0 - 29.0 mmol/L Final   • Calcium 08/06/2020 9.2  8.6 - 10.5 mg/dL Final   • eGFR Non African Amer 08/06/2020 66  >60 mL/min/1.73 Final   •  BUN/Creatinine Ratio 08/06/2020 18.8  7.0 - 25.0 Final   • Anion Gap 08/06/2020 7.4  5.0 - 15.0 mmol/L Final   • WBC 08/06/2020 14.08 (A) 3.40 - 10.80 10*3/mm3 Final   • RBC 08/06/2020 3.51 (A) 3.77 - 5.28 10*6/mm3 Final   • Hemoglobin 08/06/2020 10.6 (A) 12.0 - 15.9 g/dL Final   • Hematocrit 08/06/2020 32.3 (A) 34.0 - 46.6 % Final   • MCV 08/06/2020 92.0  79.0 - 97.0 fL Final   • MCH 08/06/2020 30.2  26.6 - 33.0 pg Final   • MCHC 08/06/2020 32.8  31.5 - 35.7 g/dL Final   • RDW 08/06/2020 13.1  12.3 - 15.4 % Final   • RDW-SD 08/06/2020 43.8  37.0 - 54.0 fl Final   • MPV 08/06/2020 10.9  6.0 - 12.0 fL Final   • Platelets 08/06/2020 196  140 - 450 10*3/mm3 Final   • Neutrophil % 08/06/2020 78.0 (A) 42.7 - 76.0 % Final   • Lymphocyte % 08/06/2020 8.7 (A) 19.6 - 45.3 % Final   • Monocyte % 08/06/2020 12.5 (A) 5.0 - 12.0 % Final   • Eosinophil % 08/06/2020 0.1 (A) 0.3 - 6.2 % Final   • Basophil % 08/06/2020 0.4  0.0 - 1.5 % Final   • Immature Grans % 08/06/2020 0.3  0.0 - 0.5 % Final   • Neutrophils, Absolute 08/06/2020 11.00 (A) 1.70 - 7.00 10*3/mm3 Final   • Lymphocytes, Absolute 08/06/2020 1.22  0.70 - 3.10 10*3/mm3 Final   • Monocytes, Absolute 08/06/2020 1.76 (A) 0.10 - 0.90 10*3/mm3 Final   • Eosinophils, Absolute 08/06/2020 0.01  0.00 - 0.40 10*3/mm3 Final   • Basophils, Absolute 08/06/2020 0.05  0.00 - 0.20 10*3/mm3 Final   • Immature Grans, Absolute 08/06/2020 0.04  0.00 - 0.05 10*3/mm3 Final   • nRBC 08/06/2020 0.0  0.0 - 0.2 /100 WBC Final   • Magnesium 08/06/2020 1.9  1.6 - 2.4 mg/dL Final   • WBC 08/07/2020 11.08 (A) 3.40 - 10.80 10*3/mm3 Final   • RBC 08/07/2020 3.12 (A) 3.77 - 5.28 10*6/mm3 Final   • Hemoglobin 08/07/2020 9.5 (A) 12.0 - 15.9 g/dL Final   • Hematocrit 08/07/2020 29.1 (A) 34.0 - 46.6 % Final   • MCV 08/07/2020 93.3  79.0 - 97.0 fL Final   • MCH 08/07/2020 30.4  26.6 - 33.0 pg Final   • MCHC 08/07/2020 32.6  31.5 - 35.7 g/dL Final   • RDW 08/07/2020 13.1  12.3 - 15.4 % Final   • RDW-SD  08/07/2020 44.8  37.0 - 54.0 fl Final   • MPV 08/07/2020 11.5  6.0 - 12.0 fL Final   • Platelets 08/07/2020 177  140 - 450 10*3/mm3 Final      Colonoscopy dated 12/03/2015 per Dr. Sagastume  - Scant early diverticular change and left colon.   - Colon polyps.   - Internal hemorrhoids.  -  No endoscopic evidence of ileitis or colitis was seen. Random biopsies were obtained from the colon upon withdrawal of the scope.   - Hepatic flexure polyp, biopsy revealed multiple fragments of tubular adenoma, no high-grade dysplasia present. Transverse colon polyp, biopsy revealed multiple fragments of tubular adenoma, no high-grade dysplasia present. Cecum polyp, biopsy revealed mild nonspecific reactive hyperplasia. No polyp structures, adenomatous change or inflammation present. Sigmoid colon polyp, biopsy revealed 2 hyperplastic polyps without atypia. Random colon biopsies revealed moderate reactive changes, mild epithelial lymphocytosis. Features suggestive but not diagnostic of microscopic colitis, lymphocytic type.     Colonoscopy dated 10/8/2019 per Dr. Sagastume  - Scant early diverticular change in the left colon.    - Colon polyps.    - 5 were removed.  3 to 5 mm in size.    - Internal hemorrhoids and hypertrophic anal papilla.    - Ascending colon polyp biopsy reveals tubular adenoma. Transverse colon polyp biopsy reveals tubular adenoma.  Ascending colon polyp jar #2 biopsy reveals tubular adenoma. Sigmoid colon biopsy reveals colonic mucosa with submucosal-like pigment; findings suggestive of previous tattoo.  Descending colon polyp biopsy reveals hyperplastic polyp.  Transverse colon polyp mid biopsy reveals findings suggestive of traditional serrated adenoma.      Assessment / Plan      1. Personal history of colonic polyps  The patient's last colonoscopy was in 2019 per Dr. Sagastume with 5 polyps removed, 4 adenomas without dysplasia. There is a family history of colon cancer in her grandmother.   Colonoscopy for surveillance.      - Case Request; Standing  - Case Request  - sodium-potassium-magnesium sulfates (Suprep Bowel Prep Kit) 17.5-3.13-1.6 GM/177ML solution oral solution; Use as directed for colonoscopy prep. Patient has instructions.  Dispense: 177 mL; Refill: 0    2. Diarrhea, unspecified type  3. Lymphocytic colitis  She has a long standing history of intermittent diarrhea that occurs about once a month with 3-4 episodes per day. She usually has 1 soft bowel movement per day otherwise. No abdominal pain, nausea or rectal bleeding. She had colonoscopy in 2015 with biopsies consistent with lymphocytic colitis.  She is due for screening colonoscopy, will obtain biopsy to rule out microscopic colitis.      Patient Instructions   1. Colonoscopy: The indications, technique, alternatives and potential risk and complications were discussed with the patient including but not limited to bleeding, perforations, missing lesions and anesthetic complications. The patient understands and wishes to proceed with the procedure and has given their verbal consent. Written patient education information was given to the patient.   2. The patient will call if they have further questions before procedure.      Zohaib Dodge, APRN  8/18/2022    Please note that portions of this note may have been completed with a voice recognition program. Efforts were made to edit the dictations, but occasionally words are mistranscribed.

## 2022-08-19 PROBLEM — Z86.0100 PERSONAL HISTORY OF COLONIC POLYPS: Status: ACTIVE | Noted: 2022-08-19

## 2022-08-19 PROBLEM — Z86.010 PERSONAL HISTORY OF COLONIC POLYPS: Status: ACTIVE | Noted: 2022-08-19

## 2022-10-14 DIAGNOSIS — M79.645 PAIN OF BOTH THUMBS: Primary | ICD-10-CM

## 2022-10-14 DIAGNOSIS — M79.644 PAIN OF BOTH THUMBS: Primary | ICD-10-CM

## 2022-10-17 ENCOUNTER — OFFICE VISIT (OUTPATIENT)
Dept: ORTHOPEDIC SURGERY | Facility: CLINIC | Age: 72
End: 2022-10-17

## 2022-10-17 VITALS — BODY MASS INDEX: 37.38 KG/M2 | WEIGHT: 198 LBS | HEIGHT: 61 IN | TEMPERATURE: 98.2 F

## 2022-10-17 DIAGNOSIS — M18.0 ARTHRITIS OF CARPOMETACARPAL (CMC) JOINT OF BOTH THUMBS: Primary | ICD-10-CM

## 2022-10-17 PROCEDURE — 99213 OFFICE O/P EST LOW 20 MIN: CPT | Performed by: PHYSICIAN ASSISTANT

## 2022-10-17 NOTE — PROGRESS NOTES
Subjective   Patient ID: Elli Edwards is a 71 y.o. right hand dominant female  Pain of the Left Hand (States she has been having pain In both thumbs for three to six months. 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 ) and Pain of the Right Hand         History of Present Illness  Patient presents with  Bilateral base of the thumb pain L>R ongoing for about 6 months. NO injury trauma. She did have to push, pull and lift frequently on her  and feels this may have caused her pain.  Denies numbness or tingling to BUE.      Pain Score: 4  Pain Location: Finger (Comment which one) (thumb)  Pain Orientation: Right, Left     Pain Descriptors: Dull, Aching, Shooting  Pain Frequency: Constant/continuous  Pain Onset: Gradual     Clinical Progression: Not changed  Aggravating Factors:  (gripping)        Pain Intervention(s): Cold applied  Result of Injury: No  Work-Related Injury: No    Past Medical History:   Diagnosis Date   • Arthritis    • Blisters of multiple sites     lower back   • Cataract     PT REPORTS BILATERAL EYES   • Colon polyp 12/03/2015   • Colon polyps 10/08/2019   • Depression    • Deviated septum     has to have breathe [employer] or tape on nose for surgery   • Diarrhea    • Diverticulosis    • Dry eyes    • Elevated cholesterol    • Frequency of urination    • GERD (gastroesophageal reflux disease)    • H/O cardiovascular stress test 10/07/2019    10+ years ago    • Hearing loss     Patient reported no use of hearing aids   • Hip pain     right hip   • Hypertension    • Hypothyroid    • Impaired functional mobility, balance, gait, and endurance    • Localized osteoarthritis of right knee    • Piercing     ears only   • Seasonal allergies     allergic to trees, grass/foxfire burns   • Tinnitus    • Wears glasses         Past Surgical History:   Procedure Laterality Date   • ADENOIDECTOMY     • CATARACT EXTRACTION Bilateral    • CATARACT EXTRACTION W/  INTRAOCULAR LENS IMPLANT Right 2/2/2017    Procedure: RIGHT CATARACT PHACO EXTRACTION WITH INTRAOCULAR LENS IMPLANT;  Surgeon: Miky Garza MD;  Location: [facility] OR;  Service:    • CATARACT EXTRACTION W/ INTRAOCULAR LENS IMPLANT Left 2/16/2017    Procedure: LEFT CATARACT PHACO EXTRACTION WITH INTRAOCULAR LENS IMPLANT;  Surgeon: Miky Garza MD;  Location: [facility] OR;  Service:    • CHOLECYSTECTOMY N/A 8/6/2019    Procedure: CHOLECYSTECTOMY LAPAROSCOPIC;  Surgeon: Oliver Aguilar MD;  Location: [facility] OR;  Service: General   • COLONOSCOPY  2016   • COLONOSCOPY N/A 10/8/2019    Procedure: COLONOSCOPY WITH POLYPECTOMY;  Surgeon: Lemuel Sagastume MD;  Location: [facility] ENDOSCOPY;  Service: Gastroenterology   • ENDOSCOPY     • LASER ABLATION      cervical   • MOUTH SURGERY      SEVERAL WISDOM TEETH EXTRACTED   • MOUTH SURGERY      ORAL IMPLANT   • TONSILLECTOMY      AT AGE 5   • TOTAL HIP ARTHROPLASTY Right 8/4/2020    Procedure: Total hip arthroplasty RIGHT (BIOMET);  Surgeon: Justice Peoples MD;  Location: [facility] OR;  Service: Orthopedics;  Laterality: Right;       Family History   Problem Relation Age of Onset   • Prostate cancer Father    • Colon cancer Paternal Grandmother    • Hyperlipidemia Other    • Thyroid cancer Mother    • Breast cancer Neg Hx    • Ovarian cancer Neg Hx        Social History     Socioeconomic History   • Marital status:    Tobacco Use   • Smoking status: Never   • Smokeless tobacco: Never   Vaping Use   • Vaping Use: Never used   Substance and Sexual Activity   • Alcohol use: No     Comment: RARELY DRINKS, 2-3 X MONTHLY   • Drug use: No   • Sexual activity: Defer         Current Outpatient Medications:   •  atorvastatin (LIPITOR) 40 MG tablet, Take 40 mg by mouth Daily., Disp: , Rfl:   •  calcium carbonate (OS-ZEESHAN) 600 MG tablet, Take 600 mg by mouth Daily., Disp: , Rfl:   •  chlorthalidone (HYGROTON) 25 MG tablet, Take 25 mg by mouth Daily., Disp: , Rfl:   •  cholecalciferol (VITAMIN  "D3) 1000 units tablet, Take 1,000 Units by mouth Daily., Disp: , Rfl:   •  lisinopril (PRINIVIL,ZESTRIL) 20 MG tablet, Take 20 mg by mouth Daily., Disp: , Rfl:   •  metoprolol succinate XL (TOPROL-XL) 25 MG 24 hr tablet, Take 25 mg by mouth Daily., Disp: , Rfl:   •  sertraline (ZOLOFT) 100 MG tablet, Take 100 mg by mouth Daily., Disp: , Rfl:   •  sodium-potassium-magnesium sulfates (Suprep Bowel Prep Kit) 17.5-3.13-1.6 GM/177ML solution oral solution, Use as directed for colonoscopy prep. Patient has instructions., Disp: 177 mL, Rfl: 0    Allergies   Allergen Reactions   • Codeine Nausea And Vomiting and Dizziness       Review of Systems   Constitutional: Negative for fever.   HENT: Negative for dental problem and voice change.    Eyes: Negative for visual disturbance.   Respiratory: Negative for shortness of breath.    Cardiovascular: Negative for chest pain.   Gastrointestinal: Negative for abdominal pain.   Genitourinary: Negative for dysuria.   Musculoskeletal: Positive for arthralgias (bilateral thumb). Negative for gait problem and joint swelling.   Skin: Negative for rash.   Neurological: Negative for speech difficulty.   Hematological: Does not bruise/bleed easily.   Psychiatric/Behavioral: Negative for confusion.       I have reviewed the medical and surgical history, family history, social history, medications, and/or allergies, and the review of systems of this report.    Objective   Temp 98.2 °F (36.8 °C)   Ht 156.2 cm (61.5\")   Wt 89.8 kg (198 lb)   LMP  (LMP Unknown)   BMI 36.81 kg/m²    Physical Exam  Vitals and nursing note reviewed.   Constitutional:       Appearance: Normal appearance.   Pulmonary:      Effort: Pulmonary effort is normal.   Musculoskeletal:      Right hand: Tenderness and bony tenderness present.      Left hand: Tenderness and bony tenderness present.        Hands:    Neurological:      Mental Status: She is alert and oriented to person, place, and time.       Ortho Exam "     Neurologic Exam     Mental Status   Oriented to person, place, and time.      There is no visible thenar atrophy to the bilateral hands.  Neg tinel sign Bilateral wrist.    Negative Finkelstein test bilateral wrist    + left thumb grind test  + right thumb grind test      Assessment & Plan   Independent Review of Radiographic Studies:    X-ray of the left thumb 2 view performed in the clinic independently reviewed for the evaluation of thumb pain.  No comparison films available to review.  No acute fracture or dislocation.  There does appear to be severe CMC basilar joint arthropathy.    X-ray of the right thumb 2 view performed in the clinic independently reviewed to the evaluation of pain.  No comparison films available to view.  No acute fracture or dislocation    Procedures       Diagnoses and all orders for this visit:    1. Arthritis of carpometacarpal (CMC) joint of both thumbs (Primary)       Orthopedic activities reviewed and patient expressed appreciation  Discussion of orthopedic goals  Risk, benefits, and merits of treatment alternatives reviewed with the patient and questions answered  Ice, heat, and/or modalities as beneficial    Recommendations/Plan:  Exercise, medications, injections, other patient advice, and return appointment as noted.  Patient is encouraged to call or return for any issues or concerns.     We briefly discussed the option of CMC joint cortisone injection versus LR TI surgery and the need for referral to hand subspecialist in [place].  She politely declined the cortisone injection and would like to try the thumb sleeves today.  She will contact the office if she needs any additional assistance or referral.  She was given bilateral neoprene thumb sleeves.   Use topical aspercreme 3 times daily  Follow up prn    Patient agreeable to call or return sooner for any concerns.        EMR Dragon-transcription disclaimer:  This encounter note is an electronic transcription of spoken  language to printed text.  Electronic transcription of spoken language may permit erroneous or at times nonsensical words or phrases to be inadvertently transcribed.  Although I have reviewed the note for such errors, some may still exist

## 2022-11-01 ENCOUNTER — HOSPITAL ENCOUNTER (OUTPATIENT)
Facility: HOSPITAL | Age: 72
Setting detail: HOSPITAL OUTPATIENT SURGERY
Discharge: HOME | End: 2022-11-01
Attending: INTERNAL MEDICINE | Admitting: INTERNAL MEDICINE

## 2022-11-01 ENCOUNTER — ANESTHESIA (OUTPATIENT)
Dept: GASTROENTEROLOGY | Facility: HOSPITAL | Age: 72
End: 2022-11-01

## 2022-11-01 ENCOUNTER — ANESTHESIA EVENT (OUTPATIENT)
Dept: GASTROENTEROLOGY | Facility: HOSPITAL | Age: 72
End: 2022-11-01

## 2022-11-01 VITALS
WEIGHT: 200 LBS | OXYGEN SATURATION: 94 % | HEART RATE: 74 BPM | BODY MASS INDEX: 36.8 KG/M2 | RESPIRATION RATE: 20 BRPM | DIASTOLIC BLOOD PRESSURE: 77 MMHG | HEIGHT: 62 IN | TEMPERATURE: 97.1 F | SYSTOLIC BLOOD PRESSURE: 120 MMHG

## 2022-11-01 DIAGNOSIS — Z86.010 PERSONAL HISTORY OF COLONIC POLYPS: ICD-10-CM

## 2022-11-01 PROCEDURE — 45380 COLONOSCOPY AND BIOPSY: CPT | Performed by: INTERNAL MEDICINE

## 2022-11-01 PROCEDURE — 45385 COLONOSCOPY W/LESION REMOVAL: CPT | Performed by: INTERNAL MEDICINE

## 2022-11-01 PROCEDURE — 25010000002 PROPOFOL 10 MG/ML EMULSION: Performed by: NURSE ANESTHETIST, CERTIFIED REGISTERED

## 2022-11-01 PROCEDURE — 88305 TISSUE EXAM BY PATHOLOGIST: CPT

## 2022-11-01 RX ORDER — PROPOFOL 10 MG/ML
VIAL (ML) INTRAVENOUS AS NEEDED
Status: DISCONTINUED | OUTPATIENT
Start: 2022-11-01 | End: 2022-11-01 | Stop reason: SURG

## 2022-11-01 RX ORDER — LIDOCAINE HYDROCHLORIDE 20 MG/ML
INJECTION, SOLUTION INTRAVENOUS AS NEEDED
Status: DISCONTINUED | OUTPATIENT
Start: 2022-11-01 | End: 2022-11-01 | Stop reason: SURG

## 2022-11-01 RX ORDER — SIMETHICONE 20 MG/.3ML
EMULSION ORAL AS NEEDED
Status: DISCONTINUED | OUTPATIENT
Start: 2022-11-01 | End: 2022-11-01 | Stop reason: HOSPADM

## 2022-11-01 RX ORDER — SODIUM CHLORIDE 9 MG/ML
70 INJECTION, SOLUTION INTRAVENOUS CONTINUOUS PRN
Status: DISCONTINUED | OUTPATIENT
Start: 2022-11-01 | End: 2022-11-01 | Stop reason: HOSPADM

## 2022-11-01 RX ORDER — BUPIVACAINE HCL/0.9 % NACL/PF 0.125 %
PLASTIC BAG, INJECTION (ML) EPIDURAL AS NEEDED
Status: DISCONTINUED | OUTPATIENT
Start: 2022-11-01 | End: 2022-11-01 | Stop reason: SURG

## 2022-11-01 RX ADMIN — SODIUM CHLORIDE 70 ML/HR: 9 INJECTION, SOLUTION INTRAVENOUS at 09:11

## 2022-11-01 RX ADMIN — PROPOFOL 50 MG: 10 INJECTION, EMULSION INTRAVENOUS at 09:55

## 2022-11-01 RX ADMIN — LIDOCAINE HYDROCHLORIDE 60 MG: 20 INJECTION, SOLUTION INTRAVENOUS at 09:55

## 2022-11-01 RX ADMIN — Medication 100 MCG: at 10:21

## 2022-11-01 RX ADMIN — Medication 100 MCG: at 10:17

## 2022-11-01 RX ADMIN — PROPOFOL 150 MCG/KG/MIN: 10 INJECTION, EMULSION INTRAVENOUS at 09:55

## 2022-11-01 NOTE — ANESTHESIA PREPROCEDURE EVALUATION
Anesthesia Evaluation     Patient summary reviewed and Nursing notes reviewed   no history of anesthetic complications:  NPO Solid Status: > 8 hours  NPO Liquid Status: > 8 hours           Airway   Mallampati: II  TM distance: >3 FB  Neck ROM: full  no difficulty expected  Dental - normal exam     Pulmonary - negative pulmonary ROS and normal exam   Cardiovascular - normal exam  Exercise tolerance: poor (<4 METS)    ECG reviewed    (+) hypertension, hyperlipidemia,     ROS comment: Normal sinus rhythm  Normal ECG  When compared with ECG of 30-JUL-2019 11:02,  No significant change was found  Confirmed by DARRYL LANG ([id number]) on 8/5/2020 6:38:46 PM    Neuro/Psych  (+) dizziness/light headedness, psychiatric history Anxiety and Depression,    GI/Hepatic/Renal/Endo    (+)  GERD,  thyroid problem hypothyroidism    Musculoskeletal     Abdominal    Substance History - negative use     OB/GYN negative ob/gyn ROS         Other   arthritis,    history of cancer (hx of skin cancer)                      Anesthesia Plan    ASA 3     MAC     (Risks and benefits discussed including risk of aspiration, recall and dental damage. All patient questions answered.    Will continue with plan of care.)  intravenous induction     Anesthetic plan, risks, benefits, and alternatives have been provided, discussed and informed consent has been obtained with: patient.  Pre-procedure education provided

## 2022-11-01 NOTE — H&P
"    [facility]  HISTORY AND PHYSICAL    Patient Name: Elli Edwards  : 1950  MRN: [id number]    Chief Complaint:   For surveillance colonoscopy    History Of Presenting Illness:    H/o colon polyps   Chronic diarrhea    Past Medical History:   Diagnosis Date   • Arthritis    • Blisters of multiple sites     lower back   • Cancer (HCC)     \"skin cancer\"   • Cataract     PT REPORTS BILATERAL EYES   • Colon polyp 2015   • Colon polyps 10/08/2019   • Depression    • Deviated septum     has to have breathe [employer] or tape on nose for surgery   • Diarrhea    • Diverticulosis    • Dry eyes    • Elevated cholesterol    • Frequency of urination    • GERD (gastroesophageal reflux disease)    • H/O cardiovascular stress test 10/07/2019    10+ years ago    • Hearing loss     Patient reported no use of hearing aids   • Hip pain     right hip   • Hypertension    • Hypothyroid    • Impaired functional mobility, balance, gait, and endurance    • Localized osteoarthritis of right knee    • Piercing     ears only   • Seasonal allergies     allergic to trees, grass/foxfire burns   • Tinnitus    • Wears glasses        Past Surgical History:   Procedure Laterality Date   • ADENOIDECTOMY     • CATARACT EXTRACTION Bilateral    • CATARACT EXTRACTION W/ INTRAOCULAR LENS IMPLANT Right 2017    Procedure: RIGHT CATARACT PHACO EXTRACTION WITH INTRAOCULAR LENS IMPLANT;  Surgeon: Miky Garza MD;  Location: [facility] OR;  Service:    • CATARACT EXTRACTION W/ INTRAOCULAR LENS IMPLANT Left 2017    Procedure: LEFT CATARACT PHACO EXTRACTION WITH INTRAOCULAR LENS IMPLANT;  Surgeon: Miky Garza MD;  Location: [facility] OR;  Service:    • CHOLECYSTECTOMY N/A 2019    Procedure: CHOLECYSTECTOMY LAPAROSCOPIC;  Surgeon: Oliver Aguilar MD;  Location:  [facility] OR;  Service: General   • COLONOSCOPY  2016   • COLONOSCOPY N/A 10/8/2019    Procedure: COLONOSCOPY WITH POLYPECTOMY;  Surgeon: Lemuel Sagastume MD;  Location: [facility]" [facility] ENDOSCOPY;  Service: Gastroenterology   • ENDOSCOPY     • LASER ABLATION      cervical   • MOUTH SURGERY      SEVERAL WISDOM TEETH EXTRACTED   • MOUTH SURGERY      ORAL IMPLANT   • TONSILLECTOMY      AT AGE 5   • TOTAL HIP ARTHROPLASTY Right 8/4/2020    Procedure: Total hip arthroplasty RIGHT (BIOMET);  Surgeon: Justice Peoples MD;  Location: [facility] OR;  Service: Orthopedics;  Laterality: Right;       Social History     Socioeconomic History   • Marital status:    Tobacco Use   • Smoking status: Never   • Smokeless tobacco: Never   Vaping Use   • Vaping Use: Never used   Substance and Sexual Activity   • Alcohol use: No     Comment: RARELY DRINKS, 2-3 X MONTHLY   • Drug use: No   • Sexual activity: Defer       Family History   Problem Relation Age of Onset   • Prostate cancer Father    • Colon cancer Paternal Grandmother    • Hyperlipidemia Other    • Thyroid cancer Mother    • Breast cancer Neg Hx    • Ovarian cancer Neg Hx        Prior to Admission Medications:  Medications Prior to Admission   Medication Sig Dispense Refill Last Dose   • atorvastatin (LIPITOR) 40 MG tablet Take 40 mg by mouth Daily.   10/30/2022 at 0900   • calcium carbonate (OS-ZEESHAN) 600 MG tablet Take 600 mg by mouth Daily.   10/30/2022 at 0900   • chlorthalidone (HYGROTON) 25 MG tablet Take 25 mg by mouth Daily.   10/30/2022 at 0900   • cholecalciferol (VITAMIN D3) 1000 units tablet Take 1,000 Units by mouth Daily.   10/30/2022 at 0900   • lisinopril (PRINIVIL,ZESTRIL) 20 MG tablet Take 20 mg by mouth Daily.   10/30/2022 at 0900   • metoprolol succinate XL (TOPROL-XL) 25 MG 24 hr tablet Take 25 mg by mouth Daily.   10/30/2022 at 0900   • sertraline (ZOLOFT) 100 MG tablet Take 100 mg by mouth Daily.   10/30/2022 at 0900   • sodium-potassium-magnesium sulfates (Suprep Bowel Prep Kit) 17.5-3.13-1.6 GM/177ML solution oral solution Use as directed for colonoscopy prep. Patient has instructions. 177 mL 0 10/30/2022 at 0900        Allergies:  Allergies   Allergen Reactions   • Codeine Nausea And Vomiting and Dizziness        Vitals: Temp:  [97.7 °F (36.5 °C)] 97.7 °F (36.5 °C)  Heart Rate:  [94] 94  Resp:  [17] 17  BP: (141)/(58) 141/58    Review Of Systems:  Constitutional:  Negative for chills, fever, and unexpected weight change.  Respiratory:  Negative for cough, chest tightness, shortness of breath, and wheezing.  Cardiovascular:  Negative for chest pain, palpitations, and leg swelling.  Gastrointestinal:  Negative for abdominal distention, abdominal pain, Nausea, vomiting.  Neurological:  Negative for Weakness, numbness, and headaches.     Physical Exam:    General Appearance:  Alert, cooperative, in no acute distress.   Lungs:   Clear to auscultation, respirations regular, even and                 unlabored.   Heart:  Regular rhythm and normal rate.   Abdomen:   Normal bowel sounds, no masses, no organomegaly. Soft, non-tender, non-distended   Neurologic: Alert and oriented x 3. Moves all four limbs equally       Plan: COLONOSCOPY (N/A)     Deanna Monteiro MD  11/1/2022

## 2022-11-01 NOTE — DISCHARGE INSTRUCTIONS
- Discharge patient to home (ambulatory).   - High fiber diet.   - Continue present medications.   - Await pathology results.   - Repeat colonoscopy in 3 - 5 years for surveillance pending path.   - Return to GI office in 8 weeks.   To assist you in voiding:  Drink plenty of fluids  Listen to running water while attempting to void.    If you are unable to urinate and you have an uncomfortable urge to void or it has been   6 hours since you were discharged, return to the Emergency Room Please follow all post op instructions and follow up appointment time from your physician's office included in your discharge packet.  .  Rest today  No pushing,pulling,tugging,heavy lifting, or strenuous activity   No major decision making,driving,or drinking alcoholic beverages for 24 hours due to the sedation you received  Always use good hand hygiene/washing technique  No driving on pain medication.

## 2022-11-01 NOTE — ANESTHESIA POSTPROCEDURE EVALUATION
Patient: Elli Edwards    Procedure Summary     Date: 11/01/22 Room / Location: [facility] ENDOSCOPY 2 / [facility] ENDOSCOPY    Anesthesia Start: 0951 Anesthesia Stop: 1025    Procedure: COLONOSCOPY with biopsy and polypectomy (Anus) Diagnosis:       Personal history of colonic polyps      (Personal history of colonic polyps [Z86.010])    Surgeons: Deanna Monteiro MD Provider: Juan Moise CRNA    Anesthesia Type: MAC ASA Status: 3          Anesthesia Type: MAC    Vitals  No vitals data found for the desired time range.          Post Anesthesia Care and Evaluation    Patient location during evaluation: PHASE II  Patient participation: complete - patient participated  Level of consciousness: awake and alert  Pain score: 0  Pain management: satisfactory to patient    Airway patency: patent  Anesthetic complications: No anesthetic complications  PONV Status: none  Cardiovascular status: acceptable and stable  Respiratory status: acceptable  Hydration status: acceptable    Comments: Vitals signs as noted in nursing documentation as per protocol.

## 2022-11-02 LAB — REF LAB TEST METHOD: NORMAL

## 2023-01-18 ENCOUNTER — TRANSCRIBE ORDERS (OUTPATIENT)
Dept: ADMINISTRATIVE | Facility: HOSPITAL | Age: 73
End: 2023-01-18
Payer: MEDICARE

## 2023-01-18 DIAGNOSIS — Z12.31 VISIT FOR SCREENING MAMMOGRAM: Primary | ICD-10-CM

## 2023-01-25 ENCOUNTER — OFFICE VISIT (OUTPATIENT)
Dept: GASTROENTEROLOGY | Facility: CLINIC | Age: 73
End: 2023-01-25
Payer: MEDICARE

## 2023-01-25 VITALS
HEIGHT: 62 IN | BODY MASS INDEX: 34.96 KG/M2 | DIASTOLIC BLOOD PRESSURE: 82 MMHG | SYSTOLIC BLOOD PRESSURE: 142 MMHG | WEIGHT: 190 LBS

## 2023-01-25 DIAGNOSIS — D12.6 ADENOMATOUS POLYP OF COLON, UNSPECIFIED PART OF COLON: ICD-10-CM

## 2023-01-25 DIAGNOSIS — K59.1 FUNCTIONAL DIARRHEA: Primary | Chronic | ICD-10-CM

## 2023-01-25 PROCEDURE — 99213 OFFICE O/P EST LOW 20 MIN: CPT | Performed by: NURSE PRACTITIONER

## 2023-01-25 NOTE — PATIENT INSTRUCTIONS
High fiber, low fat diet with liberal water intake.   Metamucil 1 packet daily.   May take Imodium as needed for diarrhea.   Advised to exercise 30 minutes 4-5 days per week.   Advised to lose 20 pounds in the next 6-12 months.   Colonoscopy for surveillance in 5 years, 2027, or sooner if needed.  Follow up: The patient will call back

## 2023-01-25 NOTE — PROGRESS NOTES
"     Follow Up Note     Date: 2023   Patient Name: Elli Edwards  MRN: [id number]  : 1950     Primary Care Provider: Avery Blackburn DO     Chief Complaint   Patient presents with   • Follow-up   • Colon Polyps     History of present illness:   2023  Elli Edwards is a 72 y.o. female who is here today for follow up after colonoscopy. She is doing very well today. No new problems or concerns.     Interval History:  2022  Elli Edwards is a 71 y.o. female who is here today for follow up for colon cancer screening.  Her last colonoscopy was in 2019 with several polyps removed. Her grandmother had colon cancer. She has a history of diarrhea off and on for many years. She may have diarrhea once per month with 3-4 episodes per day. Denies abdominal pain. No rectal bleeding. No reflux or difficulty swallowing. Denies nausea or vomiting.     2019  The patient is here for follow up to discuss colonoscopy results. She has been doing well. The patient denies recent change in bowel habits. There is no diarrhea or constipation. There is no history of abdominal pain. There is no history of overt GI bleed (hematemesis melena or hematochezia). The patient denies nausea or vomiting. There is no history of reflux. The patient denies dysphagia or odynophagia. There is no history of recent significant weight loss. There is no history of liver disease in the past.    Subjective      Past Medical History:   Diagnosis Date   • Arthritis    • Blisters of multiple sites     lower back   • Cancer (HCC)     \"skin cancer\"   • Cataract     PT REPORTS BILATERAL EYES   • Colon polyp 2015   • Colon polyps 10/08/2019   • Depression    • Deviated septum     has to have breathe [employer] or tape on nose for surgery   • Diarrhea    • Diverticulosis    • Dry eyes    • Elevated cholesterol    • Frequency of urination    • GERD (gastroesophageal reflux disease)    • H/O cardiovascular stress test 10/07/2019    " 10+ years ago    • Hearing loss     Patient reported no use of hearing aids   • Hip pain     right hip   • Hypertension    • Hypothyroid    • Impaired functional mobility, balance, gait, and endurance    • Localized osteoarthritis of right knee    • Piercing     ears only   • Seasonal allergies     allergic to trees, grass/foxfire burns   • Tinnitus    • Wears glasses      Past Surgical History:   Procedure Laterality Date   • ADENOIDECTOMY     • CATARACT EXTRACTION Bilateral    • CATARACT EXTRACTION W/ INTRAOCULAR LENS IMPLANT Right 2/2/2017    Procedure: RIGHT CATARACT PHACO EXTRACTION WITH INTRAOCULAR LENS IMPLANT;  Surgeon: Miky Garza MD;  Location: [facility] OR;  Service:    • CATARACT EXTRACTION W/ INTRAOCULAR LENS IMPLANT Left 2/16/2017    Procedure: LEFT CATARACT PHACO EXTRACTION WITH INTRAOCULAR LENS IMPLANT;  Surgeon: Miky Garza MD;  Location: [facility] OR;  Service:    • CHOLECYSTECTOMY N/A 8/6/2019    Procedure: CHOLECYSTECTOMY LAPAROSCOPIC;  Surgeon: Oliver Aguilar MD;  Location: [facility] OR;  Service: General   • COLONOSCOPY  2016   • COLONOSCOPY N/A 10/8/2019    Procedure: COLONOSCOPY WITH POLYPECTOMY;  Surgeon: Lemuel Sagastume MD;  Location: [facility] ENDOSCOPY;  Service: Gastroenterology   • COLONOSCOPY N/A 11/1/2022    Procedure: COLONOSCOPY with biopsy and polypectomy;  Surgeon: Deanna Monteiro MD;  Location: [facility] ENDOSCOPY;  Service: Gastroenterology;  Laterality: N/A;   • ENDOSCOPY     • LASER ABLATION      cervical   • MOUTH SURGERY      SEVERAL WISDOM TEETH EXTRACTED   • MOUTH SURGERY      ORAL IMPLANT   • TONSILLECTOMY      AT AGE 5   • TOTAL HIP ARTHROPLASTY Right 8/4/2020    Procedure: Total hip arthroplasty RIGHT (BIOMET);  Surgeon: Justice Peoples MD;  Location: [facility] OR;  Service: Orthopedics;  Laterality: Right;     Family History   Problem Relation Age of Onset   • Prostate cancer Father    • Colon cancer Paternal Grandmother    • Hyperlipidemia Other    • Thyroid cancer Mother     • Breast cancer Neg Hx    • Ovarian cancer Neg Hx      Social History     Socioeconomic History   • Marital status:    Tobacco Use   • Smoking status: Never   • Smokeless tobacco: Never   Vaping Use   • Vaping Use: Never used   Substance and Sexual Activity   • Alcohol use: No     Comment: RARELY DRINKS, 2-3 X MONTHLY   • Drug use: No   • Sexual activity: Defer       Current Outpatient Medications:   •  atorvastatin (LIPITOR) 40 MG tablet, Take 40 mg by mouth Daily., Disp: , Rfl:   •  calcium carbonate (OS-ZEESHAN) 600 MG tablet, Take 600 mg by mouth Daily., Disp: , Rfl:   •  chlorthalidone (HYGROTON) 25 MG tablet, Take 25 mg by mouth Daily., Disp: , Rfl:   •  cholecalciferol (VITAMIN D3) 1000 units tablet, Take 1,000 Units by mouth Daily., Disp: , Rfl:   •  lisinopril (PRINIVIL,ZESTRIL) 20 MG tablet, Take 20 mg by mouth Daily., Disp: , Rfl:   •  metoprolol succinate XL (TOPROL-XL) 25 MG 24 hr tablet, Take 25 mg by mouth Daily., Disp: , Rfl:   •  sertraline (ZOLOFT) 100 MG tablet, Take 100 mg by mouth Daily., Disp: , Rfl:      Allergies   Allergen Reactions   • Codeine Nausea And Vomiting and Dizziness     The following portions of the patient's history were reviewed and updated as appropriate: allergies, current medications, past family history, past medical history, past social history, past surgical history and problem list.  Objective     Physical Exam  Vitals and nursing note reviewed.   Constitutional:       General: She is not in acute distress.     Appearance: Normal appearance. She is well-developed.   HENT:      Head: Normocephalic and atraumatic.      Mouth/Throat:      Mouth: Mucous membranes are not pale, not dry and not cyanotic.   Eyes:      General: Lids are normal.   Neck:      Trachea: Trachea normal.   Cardiovascular:      Rate and Rhythm: Normal rate.   Pulmonary:      Effort: Pulmonary effort is normal. No respiratory distress.      Breath sounds: Normal breath sounds.   Abdominal:       "Tenderness: There is no abdominal tenderness.   Skin:     General: Skin is warm and dry.   Neurological:      Mental Status: She is alert and oriented to person, place, and time.   Psychiatric:         Mood and Affect: Mood normal.         Speech: Speech normal.         Behavior: Behavior normal. Behavior is cooperative.       Vitals:    23 1130   BP: 142/82   Weight: 86.2 kg (190 lb)   Height: 157.5 cm (62\")     Body mass index is 34.75 kg/m².     Results Review:   I reviewed the patient's new clinical results.    Admission on 2022, Discharged on 2022   Component Date Value Ref Range Status   • Reference Lab Report 2022    Final                    Value:[facility]  [address] [place] [address]  Phone: [phone] or [phone]  Fax: [phone]  Dwayne Barrera M.D., Medical Director    PATIENT NAME                           LABORATORY NO.  CARLEY BARTON.                       [id number]  [id number]                         AGE              SEX  SSN           CLIENT REF #  [facility]            71      1950  F    [id number]   [id number]    [address]                REQUESTING M.D.     ATTENDING M.D.     COPY TO.  [address]                        [place] [address]                 [facility]  DATE COLLECTED      DATE RECEIVED      DATE REPORTED  2022    DIAGNOSIS:  A.   CECUM POLYP:  Tubular adenoma without high-grade dysplasia  B.   TERMINAL ILEUM BIOPSY:  Ileal mucosa with no significant histopathologic abnormality  Negative for active enteritis, granulomatous inflammation,                           and neoplasia  C.   CECUM BIOPSY, AND ASCENDING:  Colonic type mucosa with no significant histopathologic abnormalities  Negative for active colitis, microscopic colitis, and neoplasm    D.   ASCENDING COLON POLYP:  Tubular adenoma " without high-grade dysplasia  E.   TRANSVERSE COLON BIOPSY:  Colonic type mucosa with no significant histopathologic abnormalities  Negative for active colitis, microscopic colitis, and neoplasm    F.   DESCENDING COLON BIOPSY:  Colonic type mucosa with no significant histopathologic abnormalities  Negative for active colitis, microscopic colitis, and neoplasm    G.   SIGMOID COLON BIOPSY:  Colonic type mucosa with no significant histopathologic abnormalities  Negative for active colitis, microscopic colitis, and neoplasm    H.   RECTAL BIOPSY:  Rectal mucosa with no significant histopathologic abnormalities  Negative for active proctitis, dysplasia, and neoplasm    I.   DESCENDING COLON POLYP:  Tubular adenoma without high-grade dysplasia  J.   RECTAL                           POLYP:  Hyperplastic polyp                           REVIEWED, DIAGNOSED AND                           ELECTRONICALLY  SIGNED BY:    José Miguel Blackwood M.D., F.C.A.P.  CPT CODES:  [id number]        Dated 4/26/2022 hemoglobin 15.8 hematocrit 47.6 platelet count 255    Colonoscopy dated 12/03/2015 per Dr. Sagastume  - Scant early diverticular change and left colon.   - Colon polyps.   - Internal hemorrhoids.  -  No endoscopic evidence of ileitis or colitis was seen. Random biopsies were obtained from the colon upon withdrawal of the scope.   - Hepatic flexure polyp, biopsy revealed multiple fragments of tubular adenoma, no high-grade dysplasia present. Transverse colon polyp, biopsy revealed multiple fragments of tubular adenoma, no high-grade dysplasia present. Cecum polyp, biopsy revealed mild nonspecific reactive hyperplasia. No polyp structures, adenomatous change or inflammation present. Sigmoid colon polyp, biopsy revealed 2 hyperplastic polyps without atypia. Random colon biopsies revealed moderate reactive changes, mild epithelial lymphocytosis. Features suggestive but not diagnostic of microscopic colitis, lymphocytic type.     Colonoscopy dated  10/8/2019 per Dr. Sagastume  - Scant early diverticular change in the left colon.    - Colon polyps.    - 5 were removed.  3 to 5 mm in size.    - Internal hemorrhoids and hypertrophic anal papilla.    - Ascending colon polyp biopsy reveals tubular adenoma. Transverse colon polyp biopsy reveals tubular adenoma.  Ascending colon polyp jar #2 biopsy reveals tubular adenoma. Sigmoid colon biopsy reveals colonic mucosa with submucosal-like pigment; findings suggestive of previous tattoo.  Descending colon polyp biopsy reveals hyperplastic polyp.  Transverse colon polyp mid biopsy reveals findings suggestive of traditional serrated adenoma.      Colonoscopy dated 11/1/2022 per Dr. Monteiro  - One 2 to 3 mm polyp in the cecum, removed with a cold snare. Resected and retrieved.  - One 4 to 5 mm polyp in the proximal ascending colon, removed with a cold snare. Resected and retrieved.  - One 4 mm polyp in the proximal descending colon, removed with a cold snare. Resected and retrieved.  - One 2 mm polyp in the rectum, removed with a cold biopsy forceps. Resected and retrieved.  - Diverticulosis in the sigmoid colon and in the descending colon.  - The examined portion of the ileum was normal. Biopsied.  - A tattoo was seen in the sigmoid colon. The tattoo site appeared normal.  - Four biopsies were obtained in the rectum, in the sigmoid colon, in the descending colon, in the transverse colon, in the ascending colon and in the cecum.  A.   CECUM POLYP:   Tubular adenoma without high-grade dysplasia   B.   TERMINAL ILEUM BIOPSY:   Ileal mucosa with no significant histopathologic abnormality   Negative for active enteritis, granulomatous inflammation, and neoplasia   C.   CECUM BIOPSY, AND ASCENDING:   Colonic type mucosa with no significant histopathologic abnormalities   Negative for active colitis, microscopic colitis, and neoplasm   D.   ASCENDING COLON POLYP:   Tubular adenoma without high-grade dysplasia   E.   TRANSVERSE COLON  BIOPSY:   Colonic type mucosa with no significant histopathologic abnormalities   Negative for active colitis, microscopic colitis, and neoplasm   F.   DESCENDING COLON BIOPSY:   Colonic type mucosa with no significant histopathologic abnormalities   Negative for active colitis, microscopic colitis, and neoplasm   G.   SIGMOID COLON BIOPSY:   Colonic type mucosa with no significant histopathologic abnormalities   Negative for active colitis, microscopic colitis, and neoplasm   H.   RECTAL BIOPSY:   Rectal mucosa with no significant histopathologic abnormalities   Negative for active proctitis, dysplasia, and neoplasm   I.   DESCENDING COLON POLYP:   Tubular adenoma without high-grade dysplasia   J.   RECTAL POLYP:   Hyperplastic polyp    Assessment / Plan      1. Functional diarrhea  Bowel habits doing ok at this time. She typically only has diarrhea if she eats certain foods, such as fast foods or greasy foods. Denies abdominal pain, nausea or rectal bleeding. Colonoscopy with no evidence of colitis or ileitis. TI and random biopsies unremarkable. Likely functional.   May take Imodium as needed for diarrhea.   Metamucil 1 packet daily.     2. Adenomatous polyp of colon, unspecified part of colon  Colonoscopy with polyps removed, 3 tubular adenomas without dysplasia. Prior tattoo site appeared normal. Biopsies unremarkable. Grandmother had colon cancer, no first degree relatives.  Colonoscopy for surveillance in 5 years, 2027, or sooner if needed.    Patient Instructions   1. High fiber, low fat diet with liberal water intake.   2. Metamucil 1 packet daily.   3. May take Imodium as needed for diarrhea.   4. Advised to exercise 30 minutes 4-5 days per week.   5. Advised to lose 20 pounds in the next 6-12 months.   6. Colonoscopy for surveillance in 5 years, 2027, or sooner if needed.  7. Follow up: The patient will call back    Zohaib Dodge, APRN  1/25/2023    Please note that portions of this note were  completed with a voice recognition program.

## 2023-02-02 ENCOUNTER — OFFICE VISIT (OUTPATIENT)
Dept: FAMILY MEDICINE CLINIC | Facility: CLINIC | Age: 73
End: 2023-02-02
Payer: MEDICARE

## 2023-02-02 DIAGNOSIS — I10 BENIGN ESSENTIAL HYPERTENSION: ICD-10-CM

## 2023-02-02 DIAGNOSIS — F51.01 PRIMARY INSOMNIA: ICD-10-CM

## 2023-02-02 DIAGNOSIS — E55.9 VITAMIN D DEFICIENCY: ICD-10-CM

## 2023-02-02 DIAGNOSIS — Z00.00 INITIAL MEDICARE ANNUAL WELLNESS VISIT: Primary | ICD-10-CM

## 2023-02-02 DIAGNOSIS — F43.21 COMPLICATED GRIEVING: ICD-10-CM

## 2023-02-02 DIAGNOSIS — E78.5 DYSLIPIDEMIA: ICD-10-CM

## 2023-02-02 DIAGNOSIS — Z13.6 ENCOUNTER FOR SCREENING FOR CARDIOVASCULAR DISORDERS: ICD-10-CM

## 2023-02-02 DIAGNOSIS — M16.11 PRIMARY OSTEOARTHRITIS OF RIGHT HIP: ICD-10-CM

## 2023-02-02 PROCEDURE — G0438 PPPS, INITIAL VISIT: HCPCS | Performed by: FAMILY MEDICINE

## 2023-02-02 PROCEDURE — 96160 PT-FOCUSED HLTH RISK ASSMT: CPT | Performed by: FAMILY MEDICINE

## 2023-02-02 PROCEDURE — 1170F FXNL STATUS ASSESSED: CPT | Performed by: FAMILY MEDICINE

## 2023-02-02 PROCEDURE — 1160F RVW MEDS BY RX/DR IN RCRD: CPT | Performed by: FAMILY MEDICINE

## 2023-02-02 RX ORDER — LISINOPRIL 20 MG/1
20 TABLET ORAL DAILY
Qty: 90 TABLET | Refills: 3 | Status: SHIPPED | OUTPATIENT
Start: 2023-02-02

## 2023-02-02 RX ORDER — ATORVASTATIN CALCIUM 40 MG/1
40 TABLET, FILM COATED ORAL DAILY
Qty: 90 TABLET | Refills: 3 | Status: SHIPPED | OUTPATIENT
Start: 2023-02-02

## 2023-02-02 RX ORDER — SERTRALINE HYDROCHLORIDE 100 MG/1
100 TABLET, FILM COATED ORAL DAILY
Qty: 90 TABLET | Refills: 3 | Status: SHIPPED | OUTPATIENT
Start: 2023-02-02

## 2023-02-02 RX ORDER — METOPROLOL SUCCINATE 25 MG/1
25 TABLET, EXTENDED RELEASE ORAL DAILY
Qty: 90 TABLET | Refills: 3 | Status: SHIPPED | OUTPATIENT
Start: 2023-02-02

## 2023-02-16 ENCOUNTER — HOSPITAL ENCOUNTER (OUTPATIENT)
Dept: MAMMOGRAPHY | Facility: HOSPITAL | Age: 73
Discharge: HOME | End: 2023-02-16
Admitting: FAMILY MEDICINE
Payer: MEDICARE

## 2023-02-16 DIAGNOSIS — Z12.31 VISIT FOR SCREENING MAMMOGRAM: ICD-10-CM

## 2023-02-16 PROCEDURE — 77067 SCR MAMMO BI INCL CAD: CPT

## 2023-02-16 PROCEDURE — 77063 BREAST TOMOSYNTHESIS BI: CPT | Performed by: RADIOLOGY

## 2023-02-16 PROCEDURE — 77067 SCR MAMMO BI INCL CAD: CPT | Performed by: RADIOLOGY

## 2023-02-16 PROCEDURE — 77063 BREAST TOMOSYNTHESIS BI: CPT

## 2023-03-01 VITALS
SYSTOLIC BLOOD PRESSURE: 120 MMHG | HEIGHT: 62 IN | HEART RATE: 76 BPM | BODY MASS INDEX: 34.96 KG/M2 | OXYGEN SATURATION: 97 % | TEMPERATURE: 98.9 F | WEIGHT: 190 LBS | DIASTOLIC BLOOD PRESSURE: 76 MMHG

## 2023-03-29 ENCOUNTER — TELEPHONE (OUTPATIENT)
Dept: FAMILY MEDICINE CLINIC | Facility: CLINIC | Age: 73
End: 2023-03-29

## 2023-03-29 NOTE — TELEPHONE ENCOUNTER
Caller: Elli Edwards    Relationship: Self    Best call back number:   [phone]        Who are you requesting to speak with (clinical staff, provider,  specific staff member): CLINICAL    What was the call regarding: NEEDS VERIFICATION ON WHAT MEDICATIONS SHE IS TO BE TAKING  Do you require a callback:YES

## 2023-04-21 ENCOUNTER — OFFICE VISIT (OUTPATIENT)
Dept: FAMILY MEDICINE CLINIC | Facility: CLINIC | Age: 73
End: 2023-04-21
Payer: MEDICARE

## 2023-04-21 VITALS
BODY MASS INDEX: 39.2 KG/M2 | SYSTOLIC BLOOD PRESSURE: 126 MMHG | DIASTOLIC BLOOD PRESSURE: 74 MMHG | HEART RATE: 80 BPM | WEIGHT: 213 LBS | HEIGHT: 62 IN | OXYGEN SATURATION: 93 % | RESPIRATION RATE: 16 BRPM | TEMPERATURE: 97.9 F

## 2023-04-21 DIAGNOSIS — M79.645 PAIN OF LEFT THUMB: ICD-10-CM

## 2023-04-21 DIAGNOSIS — I10 BENIGN ESSENTIAL HYPERTENSION: Primary | ICD-10-CM

## 2023-04-21 DIAGNOSIS — Z88.9 MULTIPLE ALLERGIES: ICD-10-CM

## 2023-04-21 DIAGNOSIS — Z90.49 STATUS POST CHOLECYSTECTOMY: ICD-10-CM

## 2023-04-21 DIAGNOSIS — K59.1 FUNCTIONAL DIARRHEA: Chronic | ICD-10-CM

## 2023-04-21 RX ORDER — MONTELUKAST SODIUM 4 MG/1
1 TABLET, CHEWABLE ORAL 3 TIMES DAILY
Qty: 90 TABLET | Refills: 2 | Status: SHIPPED | OUTPATIENT
Start: 2023-04-21

## 2023-04-21 RX ORDER — LIDOCAINE HYDROCHLORIDE 20 MG/ML
JELLY TOPICAL AS NEEDED
Qty: 20 ML | Refills: 2 | Status: SHIPPED | OUTPATIENT
Start: 2023-04-21

## 2023-04-21 RX ORDER — HYDROXYZINE HYDROCHLORIDE 25 MG/1
25 TABLET, FILM COATED ORAL NIGHTLY PRN
Qty: 15 TABLET | Refills: 0 | Status: SHIPPED | OUTPATIENT
Start: 2023-04-21

## 2023-04-21 NOTE — PROGRESS NOTES
"                      Established Patient        Chief Complaint:   Chief Complaint   Patient presents with   • Sinusitis   • Diarrhea     Chronic after getting gallbladder taken out.    • Wheezing         History of Present Illness:    Elli Edwards is a 72 y.o. female who presents today for complaints of sinusitis, wheezing, allergies.     Patient had gallbladder removed in 2019, since then she has had consistent diarrhea. States that it does not matter what she eats, she gets an upset stomach and food \"runs right through\". Patient had a prescription for Colestipol HCL prior to her surgery but has not taken it since the surgery. Did not know if she needed it or not.     Patient states that she has a bottle of chlorthalidone and thinks that she needs it because if she does not take it, her feet and ankles swell. Reports that her bones feel \"sore\".     Patient state that she does not want to take as many medications. Takes Lisinopril and Metoprolol, does not want to take both of these medications.     Patient reports that her left thumb has been bothering her for some time now. Has had scans and was told previously by PCP that she could get a tube of medication to help with the pain. Has tried OTC gels with minimal improvement.    Acute concerns of wheezing, sinus pressure, ear pressure, worsening allergies. States that she takes benadryl 50mg at night but still unable to sleep. Used to use allergy injections, has multiple environmental and food allergies.     Subjective     The following portions of the patient's history were reviewed and updated as appropriate: allergies, current medications, past family history, past medical history, past social history, past surgical history and problem list.    ALLERGIES  Allergies   Allergen Reactions   • Codeine Nausea And Vomiting and Dizziness       ROS  Review of Systems   Constitutional: Negative for fatigue and fever.   Respiratory: Negative for cough and shortness of " "breath.    Cardiovascular: Positive for palpitations (intermittent) and leg swelling. Negative for chest pain.   Gastrointestinal: Positive for diarrhea. Negative for nausea and vomiting.   Neurological: Negative for weakness.   Psychiatric/Behavioral: Negative for sleep disturbance.       Objective     Vital Signs:   /74   Pulse 80   Temp 97.9 °F (36.6 °C)   Resp 16   Ht 157.5 cm (62\")   Wt 96.6 kg (213 lb)   LMP  (LMP Unknown)   SpO2 93%   BMI 38.96 kg/m²     Class 2 Severe Obesity (BMI >=35 and <=39.9). Obesity-related health conditions include the following: hypertension, dyslipidemias, GERD and osteoarthritis. Obesity is unchanged. BMI is is above average; BMI management plan is completed. We discussed portion control and increasing exercise.       Physical Exam   Physical Exam  Vitals and nursing note reviewed.   Constitutional:       Appearance: She is obese.   Eyes:      Pupils: Pupils are equal, round, and reactive to light.   Cardiovascular:      Rate and Rhythm: Normal rate and regular rhythm.      Heart sounds: Normal heart sounds.   Pulmonary:      Effort: Pulmonary effort is normal.      Breath sounds: Normal breath sounds.   Abdominal:      General: Bowel sounds are normal.      Palpations: Abdomen is soft.   Neurological:      Mental Status: She is alert and oriented to person, place, and time.   Psychiatric:         Mood and Affect: Mood normal.         Behavior: Behavior normal.         Assessment and Plan      Assessment/Plan:   Diagnoses and all orders for this visit:    1. Benign essential hypertension (Primary)    2. Status post cholecystectomy  -     colestipol (COLESTID) 1 g tablet; Take 1 tablet by mouth 3 (Three) Times a Day. Before meals to help with diarrhea  Dispense: 90 tablet; Refill: 2    3. Functional diarrhea  -     colestipol (COLESTID) 1 g tablet; Take 1 tablet by mouth 3 (Three) Times a Day. Before meals to help with diarrhea  Dispense: 90 tablet; Refill: 2    4. " Pain of left thumb  -     Lidocaine HCl gel (XYLOCAINE) 2 % gel; Apply  topically to the appropriate area as directed As Needed for Mild Pain or Moderate Pain.  Dispense: 20 mL; Refill: 2    5. Multiple allergies  -     hydrOXYzine (ATARAX) 25 MG tablet; Take 1 tablet by mouth At Night As Needed for Allergies or Anxiety.  Dispense: 15 tablet; Refill: 0        Discussion Summary:  Discussed plan of care in detail with pt today; pt verb understanding and agrees.      I spent 35 minutes caring for Elil on this date of service. This time includes time spent by me in the following activities:preparing for the visit, obtaining and/or reviewing a separately obtained history, performing a medically appropriate examination and/or evaluation , counseling and educating the patient/family/caregiver, ordering medications, tests, or procedures and documenting information in the medical record      I have reviewed and updated all copied forward information, as appropriate.  I attest to the accuracy and relevance of any unchanged information.      Follow up:  Return in about 6 months (around 10/21/2023).     Patient Education:  There are no Patient Instructions on file for this visit.    ALHAJI Hester  04/21/23  14:46 EDT          Please note that portions of this note may have been completed with a voice recognition program.

## 2023-05-03 DIAGNOSIS — Z88.9 MULTIPLE ALLERGIES: ICD-10-CM

## 2023-05-03 RX ORDER — HYDROXYZINE HYDROCHLORIDE 25 MG/1
25 TABLET, FILM COATED ORAL NIGHTLY PRN
Qty: 15 TABLET | Refills: 0 | Status: SHIPPED | OUTPATIENT
Start: 2023-05-03

## 2023-05-04 ENCOUNTER — TELEPHONE (OUTPATIENT)
Dept: FAMILY MEDICINE CLINIC | Facility: CLINIC | Age: 73
End: 2023-05-04

## 2023-05-04 NOTE — TELEPHONE ENCOUNTER
Caller: Elli Edwards    Relationship: Self    Best call back number:  [phone]    Who are you requesting to speak with (clinical staff, provider,  specific staff member): SABRA AREVALO     What was the call regarding:  MEDICATION METROCAL FOR FIBER   PATIENT SAID SHE CANNOT TAKE THIS BECAUSE  IT IS VERY THICK AND SHE CAN'T SWALLOW IT     Do you require a callback:  PLEASE CALL   SHE WOULD LIKE TO SEE IF THERE IS SOMETHING SHE COULD TAKE

## 2023-05-06 DIAGNOSIS — E78.5 DYSLIPIDEMIA: Primary | Chronic | ICD-10-CM

## 2023-05-06 RX ORDER — COLESEVELAM 180 1/1
1875 TABLET ORAL 2 TIMES DAILY WITH MEALS
Qty: 180 TABLET | Refills: 3 | Status: SHIPPED | OUTPATIENT
Start: 2023-05-06 | End: 2023-05-06 | Stop reason: SDUPTHER

## 2023-05-06 RX ORDER — COLESEVELAM 180 1/1
1875 TABLET ORAL 2 TIMES DAILY WITH MEALS
Qty: 180 TABLET | Refills: 3 | Status: SHIPPED | OUTPATIENT
Start: 2023-05-06

## 2023-05-15 DIAGNOSIS — Z88.9 MULTIPLE ALLERGIES: ICD-10-CM

## 2023-05-16 RX ORDER — HYDROXYZINE HYDROCHLORIDE 25 MG/1
25 TABLET, FILM COATED ORAL NIGHTLY PRN
Qty: 15 TABLET | Refills: 0 | OUTPATIENT
Start: 2023-05-16

## 2023-08-03 ENCOUNTER — OFFICE VISIT (OUTPATIENT)
Dept: FAMILY MEDICINE CLINIC | Facility: CLINIC | Age: 73
End: 2023-08-03
Payer: MEDICARE

## 2023-08-03 VITALS
RESPIRATION RATE: 16 BRPM | WEIGHT: 198 LBS | HEIGHT: 62 IN | DIASTOLIC BLOOD PRESSURE: 74 MMHG | HEART RATE: 75 BPM | BODY MASS INDEX: 36.44 KG/M2 | OXYGEN SATURATION: 94 % | SYSTOLIC BLOOD PRESSURE: 116 MMHG | TEMPERATURE: 96 F

## 2023-08-03 DIAGNOSIS — F43.21 COMPLICATED GRIEVING: ICD-10-CM

## 2023-08-03 DIAGNOSIS — I10 BENIGN ESSENTIAL HYPERTENSION: Primary | ICD-10-CM

## 2023-08-03 DIAGNOSIS — E78.5 DYSLIPIDEMIA: Chronic | ICD-10-CM

## 2023-08-03 DIAGNOSIS — Z23 NEED FOR PNEUMOCOCCAL VACCINATION: ICD-10-CM

## 2023-08-03 RX ORDER — CEFDINIR 300 MG/1
300 CAPSULE ORAL DAILY
Qty: 5 CAPSULE | Refills: 0 | Status: SHIPPED | OUTPATIENT
Start: 2023-08-03 | End: 2023-08-08

## 2023-08-03 RX ORDER — ATORVASTATIN CALCIUM 40 MG/1
40 TABLET, FILM COATED ORAL DAILY
Qty: 90 TABLET | Refills: 3 | Status: SHIPPED | OUTPATIENT
Start: 2023-08-03

## 2023-08-03 RX ORDER — SERTRALINE HYDROCHLORIDE 100 MG/1
100 TABLET, FILM COATED ORAL DAILY
Qty: 90 TABLET | Refills: 3 | Status: SHIPPED | OUTPATIENT
Start: 2023-08-03

## 2023-08-03 RX ORDER — LISINOPRIL AND HYDROCHLOROTHIAZIDE 20; 12.5 MG/1; MG/1
1 TABLET ORAL DAILY
Qty: 90 TABLET | Refills: 3 | Status: SHIPPED | OUTPATIENT
Start: 2023-08-03

## 2023-08-03 RX ORDER — METOPROLOL SUCCINATE 25 MG/1
25 TABLET, EXTENDED RELEASE ORAL DAILY
Qty: 90 TABLET | Refills: 3 | Status: SHIPPED | OUTPATIENT
Start: 2023-08-03

## 2023-08-04 LAB
ALBUMIN SERPL-MCNC: 4.5 G/DL (ref 3.5–5.2)
ALBUMIN/GLOB SERPL: 2.1 G/DL
ALP SERPL-CCNC: 71 U/L (ref 39–117)
ALT SERPL-CCNC: 14 U/L (ref 1–33)
AST SERPL-CCNC: 18 U/L (ref 1–32)
BASOPHILS # BLD AUTO: 0.05 10*3/MM3 (ref 0–0.2)
BASOPHILS NFR BLD AUTO: 0.9 % (ref 0–1.5)
BILIRUB SERPL-MCNC: 0.5 MG/DL (ref 0–1.2)
BUN SERPL-MCNC: 16 MG/DL (ref 8–23)
BUN/CREAT SERPL: 23.5 (ref 7–25)
CALCIUM SERPL-MCNC: 9.5 MG/DL (ref 8.6–10.5)
CHLORIDE SERPL-SCNC: 103 MMOL/L (ref 98–107)
CHOLEST SERPL-MCNC: 169 MG/DL (ref 0–200)
CO2 SERPL-SCNC: 30 MMOL/L (ref 22–29)
CREAT SERPL-MCNC: 0.68 MG/DL (ref 0.57–1)
EGFRCR SERPLBLD CKD-EPI 2021: 92.1 ML/MIN/1.73
EOSINOPHIL # BLD AUTO: 0.07 10*3/MM3 (ref 0–0.4)
EOSINOPHIL NFR BLD AUTO: 1.2 % (ref 0.3–6.2)
ERYTHROCYTE [DISTWIDTH] IN BLOOD BY AUTOMATED COUNT: 12.2 % (ref 12.3–15.4)
GLOBULIN SER CALC-MCNC: 2.1 GM/DL
GLUCOSE SERPL-MCNC: 97 MG/DL (ref 65–99)
HCT VFR BLD AUTO: 45.1 % (ref 34–46.6)
HDLC SERPL-MCNC: 76 MG/DL (ref 40–60)
HGB BLD-MCNC: 15 G/DL (ref 12–15.9)
IMM GRANULOCYTES # BLD AUTO: 0.02 10*3/MM3 (ref 0–0.05)
IMM GRANULOCYTES NFR BLD AUTO: 0.3 % (ref 0–0.5)
LDLC SERPL CALC-MCNC: 76 MG/DL (ref 0–100)
LYMPHOCYTES # BLD AUTO: 1.7 10*3/MM3 (ref 0.7–3.1)
LYMPHOCYTES NFR BLD AUTO: 29.4 % (ref 19.6–45.3)
MCH RBC QN AUTO: 30.4 PG (ref 26.6–33)
MCHC RBC AUTO-ENTMCNC: 33.3 G/DL (ref 31.5–35.7)
MCV RBC AUTO: 91.3 FL (ref 79–97)
MONOCYTES # BLD AUTO: 0.47 10*3/MM3 (ref 0.1–0.9)
MONOCYTES NFR BLD AUTO: 8.1 % (ref 5–12)
NEUTROPHILS # BLD AUTO: 3.47 10*3/MM3 (ref 1.7–7)
NEUTROPHILS NFR BLD AUTO: 60.1 % (ref 42.7–76)
NRBC BLD AUTO-RTO: 0 /100 WBC (ref 0–0.2)
PLATELET # BLD AUTO: 248 10*3/MM3 (ref 140–450)
POTASSIUM SERPL-SCNC: 3.7 MMOL/L (ref 3.5–5.2)
PROT SERPL-MCNC: 6.6 G/DL (ref 6–8.5)
RBC # BLD AUTO: 4.94 10*6/MM3 (ref 3.77–5.28)
SODIUM SERPL-SCNC: 141 MMOL/L (ref 136–145)
TRIGL SERPL-MCNC: 91 MG/DL (ref 0–150)
VLDLC SERPL CALC-MCNC: 17 MG/DL (ref 5–40)
WBC # BLD AUTO: 5.78 10*3/MM3 (ref 3.4–10.8)

## 2023-09-19 ENCOUNTER — TELEPHONE (OUTPATIENT)
Dept: FAMILY MEDICINE CLINIC | Facility: CLINIC | Age: 73
End: 2023-09-19

## 2023-09-19 NOTE — TELEPHONE ENCOUNTER
Caller: Elli Edwards    Relationship: Self    Best call back number: [phone]     What medication are you requesting: SOMETHING TO TREAT SYMPTOMS    What are your current symptoms: POSSIBLE COVID: CONSTANT COUGH, NOSE CONGESTION, TROUBLE SMELLING, SLIGHT FEVER, SORE THROAT, FATIGUE    How long have you been experiencing symptoms: LAST NIGHT 09.18.23    Have you had these symptoms before:    [x] Yes  [] No    Have you been treated for these symptoms before:   [x] Yes  [] No    If a prescription is needed, what is your preferred pharmacy and phone number:  [address] [phone] [facility] [phone]       Additional notes: PATIENT IS SURE SHE HAS COVID.  HER DAUGHTER AND GRANDCHILDREN CAME BACK FROM [place] AND TESTED POSITIVE. SHE IS CERTAIN THAT SHE HAS IT BECAUSE OF HOW THE SYMPTOMS ARE. SHE HAD IT A LONG TIME AGO. PLEASE CALL PATIENT TO LET HER KNOW IF THERE IS ANY PRESCRIPTION MEDICATION THAT CAN BE SENT OR IF THERE IS ANY OVER THE COUNTER MEDICATION THAT IS SUGGESTED.

## 2023-09-20 RX ORDER — DEXAMETHASONE 0.5 MG/1
TABLET ORAL
Qty: 21 TABLET | Refills: 0 | Status: SHIPPED | OUTPATIENT
Start: 2023-09-20

## 2023-09-20 RX ORDER — AZITHROMYCIN 250 MG/1
TABLET, FILM COATED ORAL
Qty: 6 TABLET | Refills: 0 | Status: SHIPPED | OUTPATIENT
Start: 2023-09-20

## 2023-10-23 DIAGNOSIS — E78.5 DYSLIPIDEMIA: Chronic | ICD-10-CM

## 2023-10-23 RX ORDER — ATORVASTATIN CALCIUM 40 MG/1
40 TABLET, FILM COATED ORAL DAILY
Qty: 90 TABLET | Refills: 3 | Status: SHIPPED | OUTPATIENT
Start: 2023-10-23

## 2024-01-29 DIAGNOSIS — I10 BENIGN ESSENTIAL HYPERTENSION: ICD-10-CM

## 2024-01-30 RX ORDER — METOPROLOL SUCCINATE 25 MG/1
25 TABLET, EXTENDED RELEASE ORAL DAILY
Qty: 90 TABLET | Refills: 3 | Status: SHIPPED | OUTPATIENT
Start: 2024-01-30

## 2024-02-13 ENCOUNTER — OFFICE VISIT (OUTPATIENT)
Dept: FAMILY MEDICINE CLINIC | Facility: CLINIC | Age: 74
End: 2024-02-13
Payer: MEDICARE

## 2024-02-13 VITALS
WEIGHT: 203 LBS | SYSTOLIC BLOOD PRESSURE: 140 MMHG | HEART RATE: 85 BPM | OXYGEN SATURATION: 95 % | BODY MASS INDEX: 37.36 KG/M2 | DIASTOLIC BLOOD PRESSURE: 72 MMHG | HEIGHT: 62 IN

## 2024-02-13 DIAGNOSIS — H65.23 CHRONIC SEROUS OTITIS MEDIA, BILATERAL: ICD-10-CM

## 2024-02-13 DIAGNOSIS — E78.5 DYSLIPIDEMIA: Chronic | ICD-10-CM

## 2024-02-13 DIAGNOSIS — Z00.00 MEDICARE ANNUAL WELLNESS VISIT, SUBSEQUENT: Primary | ICD-10-CM

## 2024-02-13 DIAGNOSIS — E55.9 VITAMIN D DEFICIENCY: ICD-10-CM

## 2024-02-13 DIAGNOSIS — J30.9 CHRONIC ALLERGIC RHINITIS: ICD-10-CM

## 2024-02-13 DIAGNOSIS — I10 BENIGN ESSENTIAL HYPERTENSION: ICD-10-CM

## 2024-02-13 DIAGNOSIS — F43.21 COMPLICATED GRIEVING: ICD-10-CM

## 2024-02-13 DIAGNOSIS — Z91.81 AT HIGH RISK FOR FALLS: ICD-10-CM

## 2024-02-13 RX ORDER — MONTELUKAST SODIUM 10 MG/1
10 TABLET ORAL NIGHTLY
Qty: 90 TABLET | Refills: 2 | Status: SHIPPED | OUTPATIENT
Start: 2024-02-13

## 2024-02-14 LAB
ALBUMIN SERPL-MCNC: 4.4 G/DL (ref 3.5–5.2)
ALBUMIN/GLOB SERPL: 1.8 G/DL
ALP SERPL-CCNC: 84 U/L (ref 39–117)
ALT SERPL-CCNC: 14 U/L (ref 1–33)
AST SERPL-CCNC: 15 U/L (ref 1–32)
BILIRUB SERPL-MCNC: 0.5 MG/DL (ref 0–1.2)
BUN SERPL-MCNC: 14 MG/DL (ref 8–23)
BUN/CREAT SERPL: 16.9 (ref 7–25)
CALCIUM SERPL-MCNC: 9.6 MG/DL (ref 8.6–10.5)
CHLORIDE SERPL-SCNC: 106 MMOL/L (ref 98–107)
CO2 SERPL-SCNC: 25.9 MMOL/L (ref 22–29)
CREAT SERPL-MCNC: 0.83 MG/DL (ref 0.57–1)
EGFRCR SERPLBLD CKD-EPI 2021: 74.1 ML/MIN/1.73
GLOBULIN SER CALC-MCNC: 2.5 GM/DL
GLUCOSE SERPL-MCNC: 100 MG/DL (ref 65–99)
POTASSIUM SERPL-SCNC: 4.3 MMOL/L (ref 3.5–5.2)
PROT SERPL-MCNC: 6.9 G/DL (ref 6–8.5)
SODIUM SERPL-SCNC: 143 MMOL/L (ref 136–145)

## 2024-05-13 DIAGNOSIS — I10 BENIGN ESSENTIAL HYPERTENSION: ICD-10-CM

## 2024-05-13 RX ORDER — LISINOPRIL AND HYDROCHLOROTHIAZIDE 20; 12.5 MG/1; MG/1
1 TABLET ORAL DAILY
Qty: 90 TABLET | Refills: 3 | Status: SHIPPED | OUTPATIENT
Start: 2024-05-13

## 2024-08-13 ENCOUNTER — OFFICE VISIT (OUTPATIENT)
Dept: FAMILY MEDICINE CLINIC | Facility: CLINIC | Age: 74
End: 2024-08-13
Payer: MEDICARE

## 2024-08-13 VITALS
SYSTOLIC BLOOD PRESSURE: 110 MMHG | BODY MASS INDEX: 37.54 KG/M2 | WEIGHT: 204 LBS | HEART RATE: 83 BPM | DIASTOLIC BLOOD PRESSURE: 78 MMHG | OXYGEN SATURATION: 94 % | HEIGHT: 62 IN

## 2024-08-13 DIAGNOSIS — F43.21 COMPLICATED GRIEVING: ICD-10-CM

## 2024-08-13 DIAGNOSIS — E78.5 DYSLIPIDEMIA: Chronic | ICD-10-CM

## 2024-08-13 DIAGNOSIS — E55.9 VITAMIN D DEFICIENCY: ICD-10-CM

## 2024-08-13 DIAGNOSIS — N39.46 MIXED STRESS AND URGE INCONTINENCE: ICD-10-CM

## 2024-08-13 DIAGNOSIS — I10 BENIGN ESSENTIAL HYPERTENSION: Primary | ICD-10-CM

## 2024-08-13 PROCEDURE — 3074F SYST BP LT 130 MM HG: CPT | Performed by: FAMILY MEDICINE

## 2024-08-13 PROCEDURE — 99214 OFFICE O/P EST MOD 30 MIN: CPT | Performed by: FAMILY MEDICINE

## 2024-08-13 PROCEDURE — 3078F DIAST BP <80 MM HG: CPT | Performed by: FAMILY MEDICINE

## 2024-08-13 NOTE — PROGRESS NOTES
Established Patient        Chief Complaint:   Chief Complaint   Patient presents with    Follow-up     HTN        Elli Edwards is a 73 y.o. female    History of Present Illness:   Here today in scheduled follow-up visit of hypertension, dyslipidemia, vitamin D deficiency, complicated grieving and mixed stress and urge incontinence.    Difficulty with longstanding mixed urine incontinence.    Denies chest pain, syncope, palpitations or vertigo.  Denies fever, chills or night sweats.  Maintains an active lifestyle, balanced dietary intake; reports good hydration habits.  Denies any BRB/BTS.  No new rashes.  Denies orthopnea, epistaxis or hemoptysis.      Subjective     The following portions of the patient's history were reviewed and updated as appropriate: allergies, current medications, past family history, past medical history, past social history, past surgical history and problem list.    Allergies   Allergen Reactions    Codeine Nausea And Vomiting and Dizziness       Review of Systems  Constitutional: Negative for fever. Negative for chills, diaphoresis, fatigue and unexpected weight change.   HENT: No dysphagia; no changes to vision/hearing/smell/taste; no epistaxis  Eyes: Negative for redness and visual disturbance.   Respiratory: negative for shortness of breath. Negative for chest pain . Negative for cough and chest tightness.   Cardiovascular: Negative for chest pain and palpitations.   Gastrointestinal: Negative for abdominal distention, abdominal pain and blood in stool.   Endocrine: Negative for cold intolerance and heat intolerance.   Genitourinary: Chronic urinary incontinence.  Musculoskeletal: Negative for arthralgias, back pain and myalgias.   Skin: Negative for color change, rash and wound.   Neurological: Negative for syncope, weakness and headaches.   Hematological: Negative for adenopathy. Does not bruise/bleed easily.   Psychiatric/Behavioral: Negative for confusion.  "The patient is not nervous/anxious.    Objective     Physical Exam   Vital Signs: /78   Pulse 83   Ht 157.5 cm (62\")   Wt 92.5 kg (204 lb)   LMP  (LMP Unknown)   SpO2 94%   BMI 37.31 kg/m²   Class 2 Severe Obesity (BMI >=35 and <=39.9). Obesity-related health conditions include the following: hypertension and osteoarthritis. Obesity is unchanged. BMI is is above average; BMI management plan is completed. We discussed portion control and increasing exercise.  Patient's (Body mass index is 37.31 kg/m².) indicates that they are     General Appearance: alert, oriented x 3, no acute distress.  Skin: warm and dry.   HEENT: Atraumatic.  pupils round and reactive to light and accommodation, oral mucosa pink and moist.  Nares patent without epistaxis.  External auditory canals are patent tympanic membranes intact.  Neck: supple, no JVD, trachea midline.  No thyromegaly  Lungs: CTA, unlabored breathing effort.  Heart: RRR, normal S1 and S2, no S3, no rub.  Abdomen: soft, non-tender, no palpable bladder, present bowel sounds to auscultation ×4.  No guarding or rigidity.  Extremities: no clubbing, cyanosis or edema.  Good range of motion actively and passively.  Symmetric muscle strength and development  Neuro: normal speech and mental status.  Cranial nerves II through XII intact.  No anosmia. DTR 2+; proprioception intact.  No focal motor/sensory deficits.    Advance Care Planning   ACP discussion was held with the patient during this visit. Patient does not have an advance directive, information provided.       Assessment and Plan      Assessment/Plan:   Diagnoses and all orders for this visit:    1. Benign essential hypertension (Primary)  -     Comprehensive Metabolic Panel    2. Dyslipidemia  -     Comprehensive Metabolic Panel    3. Vitamin D deficiency  -     Comprehensive Metabolic Panel  -     Vitamin D 25 hydroxy    4. Complicated grieving    5. Mixed stress and urge incontinence  -     Ambulatory " Referral to Physical Therapy for Evaluation & Treatment      Referral PT for pelvic floor therapy, patient is hopeful to avoid medications for tx of MUSI.    Surveillance labs today.    VSS, appears HD asymptomatic.      Discussion Summary:    Discussed plan of care in detail with pt today; pt verb understanding and agrees.    I spent 35 minutes caring for Elli on this date of service. This time includes time spent by me in the following activities:preparing for the visit, performing a medically appropriate examination and/or evaluation , counseling and educating the patient/family/caregiver, ordering medications, tests, or procedures, documenting information in the medical record, and care coordination    I have reviewed and updated all copied forward information, as appropriate.  I attest to the accuracy and relevance of any unchanged information.    Follow up:  Return in about 6 months (around 2/14/2025) for Medicare Wellness, Subsequent.     There are no Patient Instructions on file for this visit.        Avery Blackburn DO  08/13/24  10:20 EDT

## 2024-08-14 LAB
25(OH)D3+25(OH)D2 SERPL-MCNC: 19.6 NG/ML (ref 30–100)
ALBUMIN SERPL-MCNC: 4.1 G/DL (ref 3.8–4.8)
ALP SERPL-CCNC: 97 IU/L (ref 44–121)
ALT SERPL-CCNC: 15 IU/L (ref 0–32)
AST SERPL-CCNC: 15 IU/L (ref 0–40)
BILIRUB SERPL-MCNC: 0.5 MG/DL (ref 0–1.2)
BUN SERPL-MCNC: 18 MG/DL (ref 8–27)
BUN/CREAT SERPL: 25 (ref 12–28)
CALCIUM SERPL-MCNC: 9.6 MG/DL (ref 8.7–10.3)
CHLORIDE SERPL-SCNC: 104 MMOL/L (ref 96–106)
CO2 SERPL-SCNC: 21 MMOL/L (ref 20–29)
CREAT SERPL-MCNC: 0.73 MG/DL (ref 0.57–1)
EGFRCR SERPLBLD CKD-EPI 2021: 86 ML/MIN/1.73
GLOBULIN SER CALC-MCNC: 2.8 G/DL (ref 1.5–4.5)
GLUCOSE SERPL-MCNC: 106 MG/DL (ref 70–99)
POTASSIUM SERPL-SCNC: 4.5 MMOL/L (ref 3.5–5.2)
PROT SERPL-MCNC: 6.9 G/DL (ref 6–8.5)
SODIUM SERPL-SCNC: 140 MMOL/L (ref 134–144)

## 2024-08-21 ENCOUNTER — TELEPHONE (OUTPATIENT)
Dept: FAMILY MEDICINE CLINIC | Facility: CLINIC | Age: 74
End: 2024-08-21
Payer: MEDICARE

## 2024-08-21 DIAGNOSIS — E55.9 VITAMIN D DEFICIENCY: Primary | ICD-10-CM

## 2024-08-21 RX ORDER — ERGOCALCIFEROL 1.25 MG/1
50000 CAPSULE ORAL WEEKLY
Qty: 8 CAPSULE | Refills: 2 | Status: SHIPPED | OUTPATIENT
Start: 2024-08-21

## 2024-08-21 NOTE — TELEPHONE ENCOUNTER
----- Message from Avery Blackburn sent at 8/21/2024 10:14 AM EDT -----  Elli,    Your blood chemistry demonstrated normal kidney/liver function.  Normal electrolytes.    Your vitamin D level is low.  I do think it would be reasonable to consider once weekly vitamin D supplementation.  A new prescription has been sent to your pharmacy.

## 2024-08-24 DIAGNOSIS — F43.21 COMPLICATED GRIEVING: ICD-10-CM

## 2024-08-26 RX ORDER — SERTRALINE HYDROCHLORIDE 100 MG/1
100 TABLET, FILM COATED ORAL DAILY
Qty: 90 TABLET | Refills: 3 | Status: SHIPPED | OUTPATIENT
Start: 2024-08-26

## 2024-09-04 ENCOUNTER — OFFICE VISIT (OUTPATIENT)
Dept: FAMILY MEDICINE CLINIC | Facility: CLINIC | Age: 74
End: 2024-09-04
Payer: MEDICARE

## 2024-09-04 VITALS
BODY MASS INDEX: 38.05 KG/M2 | HEIGHT: 62 IN | RESPIRATION RATE: 16 BRPM | DIASTOLIC BLOOD PRESSURE: 68 MMHG | SYSTOLIC BLOOD PRESSURE: 112 MMHG | OXYGEN SATURATION: 98 % | HEART RATE: 86 BPM | TEMPERATURE: 97.5 F | WEIGHT: 206.8 LBS

## 2024-09-04 DIAGNOSIS — J01.01 ACUTE RECURRENT MAXILLARY SINUSITIS: Primary | ICD-10-CM

## 2024-09-04 DIAGNOSIS — H65.111 ACUTE ALLERGIC OTITIS MEDIA OF RIGHT EAR, RECURRENCE NOT SPECIFIED: ICD-10-CM

## 2024-09-04 DIAGNOSIS — J30.9 CHRONIC ALLERGIC RHINITIS: ICD-10-CM

## 2024-09-04 PROCEDURE — 99213 OFFICE O/P EST LOW 20 MIN: CPT | Performed by: FAMILY MEDICINE

## 2024-09-04 PROCEDURE — 3074F SYST BP LT 130 MM HG: CPT | Performed by: FAMILY MEDICINE

## 2024-09-04 PROCEDURE — 3078F DIAST BP <80 MM HG: CPT | Performed by: FAMILY MEDICINE

## 2024-09-04 PROCEDURE — 1159F MED LIST DOCD IN RCRD: CPT | Performed by: FAMILY MEDICINE

## 2024-09-04 PROCEDURE — 1160F RVW MEDS BY RX/DR IN RCRD: CPT | Performed by: FAMILY MEDICINE

## 2024-09-04 RX ORDER — CEFDINIR 300 MG/1
300 CAPSULE ORAL 2 TIMES DAILY
Qty: 10 CAPSULE | Refills: 0 | Status: SHIPPED | OUTPATIENT
Start: 2024-09-04 | End: 2024-09-09

## 2024-09-04 RX ORDER — DEXAMETHASONE 0.5 MG/1
TABLET ORAL
Qty: 21 TABLET | Refills: 0 | Status: SHIPPED | OUTPATIENT
Start: 2024-09-04 | End: 2024-09-09

## 2024-09-04 NOTE — PROGRESS NOTES
"    Office Note     Name: Elli Edwards  : 1950   MRN: [id number]     Chief Complaint:  Earache    Subjective     History of Present Illness:  Elli Edwards is a 73 y.o. female who presents today for earache, R > L. States that her allergies have been bothering her with increased rhinorrhea, PND, nasal congestion. She also endorses fatigue. No fever or chills. No SOB or wheeze. No N/V/D. No known sick contacts.       I have reviewed the following portions of the patient's history and these were updated and discussed with the patient as appropriate: past family history, past medical history, past social history, past surgical history, and problem list.     Objective     Vital Signs  /68   Pulse 86   Temp 97.5 °F (36.4 °C) (Temporal)   Resp 16   Ht 157.5 cm (62\")   Wt 93.8 kg (206 lb 12.8 oz)   SpO2 98%   BMI 37.82 kg/m²   Estimated body mass index is 37.82 kg/m² as calculated from the following:    Height as of this encounter: 157.5 cm (62\").    Weight as of this encounter: 93.8 kg (206 lb 12.8 oz).    Physical Exam  Vitals reviewed.   Constitutional:       General: She is not in acute distress.     Appearance: Normal appearance. She is not ill-appearing or toxic-appearing.   HENT:      Head: Normocephalic.      Right Ear: Ear canal and external ear normal. Tympanic membrane is bulging. Tympanic membrane is not scarred, perforated or erythematous.      Left Ear: Tympanic membrane, ear canal and external ear normal.      Nose: Congestion present.      Mouth/Throat:      Mouth: Mucous membranes are moist.      Pharynx: Posterior oropharyngeal erythema present. No oropharyngeal exudate.      Comments: PND noted  Eyes:      Extraocular Movements: Extraocular movements intact.   Cardiovascular:      Rate and Rhythm: Normal rate and regular rhythm.      Heart sounds: Normal heart sounds. No murmur heard.  Pulmonary:      Effort: Pulmonary effort is normal. No respiratory distress.      Breath " sounds: Normal breath sounds. No stridor. No wheezing, rhonchi or rales.   Chest:      Chest wall: No tenderness.   Musculoskeletal:         General: Normal range of motion.      Cervical back: Normal range of motion and neck supple. No rigidity or tenderness.   Lymphadenopathy:      Cervical: Cervical adenopathy present.   Skin:     General: Skin is warm and dry.   Neurological:      Mental Status: She is alert and oriented to person, place, and time.   Psychiatric:         Mood and Affect: Mood normal.            Assessment and Plan     Diagnoses and all orders for this visit:    1. Acute recurrent maxillary sinusitis (Primary)  -     cefdinir (OMNICEF) 300 MG capsule; Take 1 capsule by mouth 2 (Two) Times a Day for 5 days.  Dispense: 10 capsule; Refill: 0  -     dexAMETHasone (Decadron) 0.5 MG tablet; Take 6 tablets by mouth Daily With Breakfast for 1 day, THEN 5 tablets Daily With Breakfast for 1 day, THEN 4 tablets Daily With Breakfast for 1 day, THEN 3 tablets Daily With Breakfast for 1 day, THEN 2 tablets Daily With Breakfast for 1 day, THEN 1 tablet Daily With Breakfast for 1 day.  Dispense: 21 tablet; Refill: 0    2. Chronic allergic rhinitis    3. Acute allergic otitis media of right ear, recurrence not specified      Sxs likely 2/2 sinus infection  Patient's vital signs demonstrate hemodynamic stability  Will start short course of abx and steroid taper  Symptomatic treatment  Increase fluid intake      Discussion Summary:     Discussed plan of care in detail with patient today. Patient was encouraged to keep me informed of any acute changes, lack of improvement, or any new concerning symptoms.  Patient is also aware of reasons to seek emergent care. Patient verbalized understanding and agrees with plan of care.    This visit was billed based on [facility].    Follow Up  Return if symptoms worsen or fail to improve.    Please note that portions of this note may have been completed with a voice recognition  program.     Timothy Mejía MD, MPH  [facility]

## 2024-09-04 NOTE — PROGRESS NOTES
"    Office Note     Name: Elli Edwards  : 1950   MRN: [id number]     Chief Complaint:  Earache (Right ear pain is worse than left. Allergies have been really bad and fatigue )    Subjective     History of Present Illness:  Elli Edwards is a 73 y.o. female who presents today for ***    HPI     I have reviewed the following portions of the patient's history and these were updated and discussed with the patient as appropriate: past family history, past medical history, past social history, past surgical history, and problem list.     Objective     Vital Signs  /68   Pulse 86   Temp 97.5 °F (36.4 °C) (Temporal)   Resp 16   Ht 157.5 cm (62\")   Wt 93.8 kg (206 lb 12.8 oz)   SpO2 98%   BMI 37.82 kg/m²   Estimated body mass index is 37.82 kg/m² as calculated from the following:    Height as of this encounter: 157.5 cm (62\").    Weight as of this encounter: 93.8 kg (206 lb 12.8 oz).    Physical Exam       Assessment and Plan     Diagnoses and all orders for this visit:    1. Acute recurrent maxillary sinusitis (Primary)  -     cefdinir (OMNICEF) 300 MG capsule; Take 1 capsule by mouth 2 (Two) Times a Day for 5 days.  Dispense: 10 capsule; Refill: 0  -     dexAMETHasone (Decadron) 0.5 MG tablet; Take 6 tablets by mouth Daily With Breakfast for 1 day, THEN 5 tablets Daily With Breakfast for 1 day, THEN 4 tablets Daily With Breakfast for 1 day, THEN 3 tablets Daily With Breakfast for 1 day, THEN 2 tablets Daily With Breakfast for 1 day, THEN 1 tablet Daily With Breakfast for 1 day.  Dispense: 21 tablet; Refill: 0      {Class 2 Severe Obesity (BMI >=35 and <=39.9. (Optional):09282}         Discussion Summary:     Discussed plan of care in detail with patient today. Patient was encouraged to keep me informed of any acute changes, lack of improvement, or any new concerning symptoms.  Patient is also aware of reasons to seek emergent care. Patient verbalized understanding and agrees with plan of " care.    This visit was billed based on {LBBillingstatment:43781}    Follow Up  No follow-ups on file.    Please note that portions of this note may have been completed with a voice recognition program.     Timothy Mejía MD, MPH  [facility]

## 2024-11-08 ENCOUNTER — TELEPHONE (OUTPATIENT)
Dept: FAMILY MEDICINE CLINIC | Facility: CLINIC | Age: 74
End: 2024-11-08
Payer: MEDICARE

## 2024-11-08 NOTE — TELEPHONE ENCOUNTER
WANTS TO TALK ABOUT A CHANGE IN MEDICATION, I MADE AN APPOINTMENT FOR 2 WEEKS (11/22/2024) BUT SHE WAS VERY ADAMANT THAT I LET THE PROV KNOW ABOUT THIS.

## 2024-11-22 ENCOUNTER — OFFICE VISIT (OUTPATIENT)
Dept: FAMILY MEDICINE CLINIC | Facility: CLINIC | Age: 74
End: 2024-11-22
Payer: MEDICARE

## 2024-11-22 VITALS
OXYGEN SATURATION: 92 % | HEIGHT: 62 IN | WEIGHT: 205 LBS | DIASTOLIC BLOOD PRESSURE: 80 MMHG | HEART RATE: 76 BPM | BODY MASS INDEX: 37.73 KG/M2 | SYSTOLIC BLOOD PRESSURE: 124 MMHG

## 2024-11-22 DIAGNOSIS — E55.9 VITAMIN D DEFICIENCY: ICD-10-CM

## 2024-11-22 DIAGNOSIS — F43.21 COMPLICATED GRIEVING: ICD-10-CM

## 2024-11-22 DIAGNOSIS — E78.5 DYSLIPIDEMIA: Chronic | ICD-10-CM

## 2024-11-22 DIAGNOSIS — F32.1 MAJOR DEPRESSIVE DISORDER, SINGLE EPISODE, MODERATE: Primary | ICD-10-CM

## 2024-11-22 DIAGNOSIS — I10 BENIGN ESSENTIAL HYPERTENSION: ICD-10-CM

## 2024-11-22 PROCEDURE — 99214 OFFICE O/P EST MOD 30 MIN: CPT | Performed by: FAMILY MEDICINE

## 2024-11-22 PROCEDURE — 3074F SYST BP LT 130 MM HG: CPT | Performed by: FAMILY MEDICINE

## 2024-11-22 PROCEDURE — 3079F DIAST BP 80-89 MM HG: CPT | Performed by: FAMILY MEDICINE

## 2024-12-03 ENCOUNTER — TRANSCRIBE ORDERS (OUTPATIENT)
Dept: ADMINISTRATIVE | Facility: HOSPITAL | Age: 74
End: 2024-12-03
Payer: MEDICARE

## 2024-12-03 DIAGNOSIS — Z12.31 SCREENING MAMMOGRAM FOR BREAST CANCER: Primary | ICD-10-CM

## 2024-12-06 ENCOUNTER — CLINICAL SUPPORT (OUTPATIENT)
Age: 74
End: 2024-12-06
Payer: MEDICARE
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
## 2024-12-06 DIAGNOSIS — M54.2 CERVICALGIA: ICD-10-CM

## 2024-12-06 RX ORDER — KETOROLAC TROMETHAMINE 30 MG/ML
30 INJECTION, SOLUTION INTRAMUSCULAR; INTRAVENOUS ONCE
Status: COMPLETED | OUTPATIENT
Start: 2024-12-06 | End: 2024-12-06

## 2024-12-06 RX ORDER — METHYLPREDNISOLONE ACETATE 80 MG/ML
80 INJECTION, SUSPENSION INTRA-ARTICULAR; INTRALESIONAL; INTRAMUSCULAR; SOFT TISSUE ONCE
Status: COMPLETED | OUTPATIENT
Start: 2024-12-06 | End: 2024-12-06

## 2024-12-06 RX ADMIN — KETOROLAC TROMETHAMINE 30 MG: 30 INJECTION, SOLUTION INTRAMUSCULAR; INTRAVENOUS at 14:43

## 2024-12-06 RX ADMIN — METHYLPREDNISOLONE ACETATE 80 MG: 80 INJECTION, SUSPENSION INTRA-ARTICULAR; INTRALESIONAL; INTRAMUSCULAR; SOFT TISSUE at 14:47

## 2024-12-07 NOTE — PROGRESS NOTES
Established Patient        Chief Complaint:   Chief Complaint   Patient presents with    Med Management        Elli Edwards is a 73 y.o. female    History of Present Illness:   Here today in scheduled follow-up visit of her hypertension, dyslipidemia vitamin D deficiency and complicated grieving.    Patient reports her mood has become more somber, problematic and her ability to enjoy routinely previously enjoyed activities/life.  She denies any SI/HI.  She does state that her depression is led to some increased malaise/fatigue at times.    Denies chest pain, syncope, palpitations or vertigo.  Denies fever, chills or night sweats.  Maintains an active lifestyle, balanced dietary intake; reports good hydration habits.  Denies hematuria/dysuria.  Denies any BRB/BTS.  No new rashes.  Denies orthopnea, epistaxis or hemoptysis.      Subjective     The following portions of the patient's history were reviewed and updated as appropriate: allergies, current medications, past family history, past medical history, past social history, past surgical history and problem list.    Allergies   Allergen Reactions    Codeine Nausea And Vomiting and Dizziness       Review of Systems  Constitutional: Negative for fever. Negative for chills, diaphoresis, fatigue and unexpected weight change.   HENT: No dysphagia; no changes to vision/hearing/smell/taste; no epistaxis  Eyes: Negative for redness and visual disturbance.   Respiratory: negative for shortness of breath. Negative for chest pain . Negative for cough and chest tightness.   Cardiovascular: Negative for chest pain and palpitations.   Gastrointestinal: Negative for abdominal distention, abdominal pain and blood in stool.   Endocrine: Negative for cold intolerance and heat intolerance.   Genitourinary: Chronic urinary incontinence.  Musculoskeletal: Negative for arthralgias, back pain and myalgias.   Skin: Negative for color change, rash and wound.  "  Neurological: Negative for syncope, weakness and headaches.   Hematological: Negative for adenopathy. Does not bruise/bleed easily.   Psychiatric/Behavioral: Negative for confusion. The patient is not nervous/anxious.  Otherwise, as per above.    Objective     Physical Exam   Vital Signs: /80   Pulse 76   Ht 157.5 cm (62\")   Wt 93 kg (205 lb)   LMP  (LMP Unknown)   SpO2 92%   BMI 37.49 kg/m²   Class 2 Severe Obesity (BMI >=35 and <=39.9). Obesity-related health conditions include the following: hypertension and osteoarthritis. Obesity is unchanged. BMI is is above average; BMI management plan is completed. We discussed portion control and increasing exercise.  Patient's (Body mass index is 37.49 kg/m².) indicates that they are     General Appearance: alert, oriented x 3, no acute distress.  During questioning/examination.  Well-nourished and developed female.  Skin: warm and dry.   HEENT: Atraumatic.  pupils round and reactive to light and accommodation, oral mucosa pink and moist.  Nares patent without epistaxis.  External auditory canals are patent tympanic membranes intact.  Neck: supple, no JVD, trachea midline.  No thyromegaly  Lungs: CTA, unlabored breathing effort.  Heart: RRR, normal S1 and S2, no S3, no rub.  Abdomen: soft, non-tender, no palpable bladder, present bowel sounds to auscultation ×4.  No guarding or rigidity.  Extremities: no clubbing, cyanosis or edema.  Good range of motion actively and passively.  Symmetric muscle strength and development  Neuro: normal speech and mental status.  Cranial nerves II through XII intact.  No anosmia. DTR 2+; proprioception intact.  No focal motor/sensory deficits.        Assessment and Plan      Assessment/Plan:   Diagnoses and all orders for this visit:    1. Major depressive disorder, single episode, moderate (Primary)  -     Vortioxetine HBr (Trintellix) 5 MG tablet tablet; Take 1 tablet by mouth Daily With Breakfast.  Dispense: 30 tablet; " Refill: 0    2. Complicated grieving  -     Vortioxetine HBr (Trintellix) 5 MG tablet tablet; Take 1 tablet by mouth Daily With Breakfast.  Dispense: 30 tablet; Refill: 0    3. Benign essential hypertension    4. Dyslipidemia      Vital signs demonstrate hemodynamic stability, blood pressure is at goal.    Plan to transition to trial of Trintellix, 5 mg samples provided to patient today.  I have asked that she notify the office should she develop any ill effects to the new medication.    Continue statin therapy.    Continue vitamin D supplementation.  Continue balanced dietary intake, as well as maintain bountiful natural sunlight exposure.      Discussion Summary:    Discussed plan of care in detail with pt today; pt verb understanding and agrees.    I spent 35 minutes caring for Elli on this date of service. This time includes time spent by me in the following activities:preparing for the visit, performing a medically appropriate examination and/or evaluation , counseling and educating the patient/family/caregiver, ordering medications, tests, or procedures, documenting information in the medical record, and care coordination    I confirm accuracy of unchanged data/findings which have been carried forward from previous visit, as well as I have updated appropriately those that have changed.      Follow up:  No follow-ups on file.     There are no Patient Instructions on file for this visit.        Avery Blackburn,   12/07/24  11:05 EST

## 2024-12-13 DIAGNOSIS — E55.9 VITAMIN D DEFICIENCY: ICD-10-CM

## 2024-12-13 RX ORDER — ERGOCALCIFEROL 1.25 MG/1
50000 CAPSULE, LIQUID FILLED ORAL WEEKLY
Qty: 8 CAPSULE | Refills: 2 | Status: SHIPPED | OUTPATIENT
Start: 2024-12-13

## 2024-12-13 NOTE — TELEPHONE ENCOUNTER
Rx Refill Note  Requested Prescriptions     Pending Prescriptions Disp Refills    vitamin D (ERGOCALCIFEROL) 1.25 MG (70974 UT) capsule capsule [Pharmacy Med Name: VIT D2 1.25 MG (50,000 UNIT 1.25 MG Capsule] 8 capsule 2     Sig: TAKE 1 CAPSULE BY MOUTH 1 (ONE) TIME PER WEEK.      Last office visit with prescribing clinician: 11/22/2024   Last telemedicine visit with prescribing clinician: Visit date not found   Next office visit with prescribing clinician: 12/27/2024                         Would you like a call back once the refill request has been completed: [] Yes [] No    If the office needs to give you a call back, can they leave a voicemail: [] Yes [] No    Zulma Youngblood MA  12/13/24, 15:46 EST

## 2024-12-27 ENCOUNTER — OFFICE VISIT (OUTPATIENT)
Age: 74
End: 2024-12-27
Payer: MEDICARE

## 2024-12-27 VITALS
SYSTOLIC BLOOD PRESSURE: 122 MMHG | OXYGEN SATURATION: 97 % | DIASTOLIC BLOOD PRESSURE: 97 MMHG | TEMPERATURE: 97.7 F | WEIGHT: 202 LBS | BODY MASS INDEX: 36.95 KG/M2 | HEART RATE: 81 BPM

## 2024-12-27 DIAGNOSIS — F43.21 COMPLICATED GRIEVING: ICD-10-CM

## 2024-12-27 DIAGNOSIS — M17.12 PRIMARY OSTEOARTHRITIS OF LEFT KNEE: ICD-10-CM

## 2024-12-27 DIAGNOSIS — H65.23 CHRONIC SEROUS OTITIS MEDIA, BILATERAL: ICD-10-CM

## 2024-12-27 DIAGNOSIS — J30.9 CHRONIC ALLERGIC RHINITIS: ICD-10-CM

## 2024-12-27 DIAGNOSIS — I10 BENIGN ESSENTIAL HYPERTENSION: Primary | ICD-10-CM

## 2024-12-27 DIAGNOSIS — M25.562 ARTHRALGIA OF LEFT KNEE: ICD-10-CM

## 2024-12-27 DIAGNOSIS — F51.01 PRIMARY INSOMNIA: ICD-10-CM

## 2024-12-27 PROCEDURE — 99214 OFFICE O/P EST MOD 30 MIN: CPT | Performed by: FAMILY MEDICINE

## 2024-12-27 PROCEDURE — G2211 COMPLEX E/M VISIT ADD ON: HCPCS | Performed by: FAMILY MEDICINE

## 2024-12-27 PROCEDURE — 3074F SYST BP LT 130 MM HG: CPT | Performed by: FAMILY MEDICINE

## 2024-12-27 PROCEDURE — 3080F DIAST BP >= 90 MM HG: CPT | Performed by: FAMILY MEDICINE

## 2024-12-27 RX ORDER — SERTRALINE HYDROCHLORIDE 100 MG/1
200 TABLET, FILM COATED ORAL DAILY
Qty: 180 TABLET | Refills: 2 | Status: SHIPPED | OUTPATIENT
Start: 2024-12-27

## 2024-12-27 RX ORDER — HYDROXYZINE PAMOATE 25 MG/1
25 CAPSULE ORAL NIGHTLY
Qty: 90 CAPSULE | Refills: 2 | Status: SHIPPED | OUTPATIENT
Start: 2024-12-27

## 2024-12-27 RX ORDER — LIDOCAINE 50 MG/G
1 PATCH TOPICAL EVERY 24 HOURS
Qty: 14 EACH | Refills: 2 | Status: SHIPPED | OUTPATIENT
Start: 2024-12-27

## 2024-12-27 NOTE — PROGRESS NOTES
Established Patient        Chief Complaint:   Chief Complaint   Patient presents with    Primary Care Follow-Up        Elli Edwards is a 74 y.o. female    History of Present Illness:   Today in scheduled follow-up visit of hypertension, insomnia and complicated grieving.    Trintellix non-efficacious, created more mood instability; has stopped over last 7 days d/t running out of samples; symptoms improved.  Denies SI/HI.    Denies chest pain, syncope, palpitations or vertigo.  Denies fever, chills or night sweats.  Maintains an active lifestyle, balanced dietary intake; reports good hydration habits.  Denies hematuria/dysuria.  Denies any BRB/BTS.  No new rashes.  Denies orthopnea, epistaxis or hemoptysis.    Does report chronic difficulties with left knee arthralgia, occasional feeling of instability and locking.  Reports prevalent edema to the affected knee additionally.  Known history of primary osteoarthritis, degenerative.    Subjective     The following portions of the patient's history were reviewed and updated as appropriate: allergies, current medications, past family history, past medical history, past social history, past surgical history and problem list.    Allergies   Allergen Reactions    Codeine Nausea And Vomiting and Dizziness       Review of Systems  Constitutional: Negative for fever. Negative for chills, diaphoresis, fatigue and unexpected weight change.   HENT: No dysphagia; no changes to vision/hearing/smell/taste; no epistaxis  Eyes: Negative for redness and visual disturbance.   Respiratory: negative for shortness of breath. Negative for chest pain . Negative for cough and chest tightness.   Cardiovascular: Negative for chest pain and palpitations.   Gastrointestinal: Negative for abdominal distention, abdominal pain and blood in stool.   Endocrine: Negative for cold intolerance and heat intolerance.   Genitourinary: Chronic urinary incontinence.  Musculoskeletal:  Chronic arthralgias, without back pain and myalgias.   Skin: Negative for color change, rash and wound.   Neurological: Negative for syncope, weakness and headaches.   Hematological: Negative for adenopathy. Does not bruise/bleed easily.   Psychiatric/Behavioral: Negative for confusion. The patient is not nervous/anxious.  Otherwise, as per above.    Objective     Physical Exam   Vital Signs: /97   Pulse 81   Temp 97.7 °F (36.5 °C)   Wt 91.6 kg (202 lb)   LMP  (LMP Unknown)   SpO2 97%   BMI 36.95 kg/m²   Class 2 Severe Obesity (BMI >=35 and <=39.9). Obesity-related health conditions include the following: hypertension and osteoarthritis. Obesity is unchanged. BMI is is above average; BMI management plan is completed. We discussed portion control and increasing exercise.  Patient's (Body mass index is 36.95 kg/m².) indicates that they are     General Appearance: alert, oriented x 3, no acute distress.  During questioning/examination.  Well-nourished and developed female.  Skin: warm and dry.   HEENT: Atraumatic.  pupils round and reactive to light and accommodation, oral mucosa pink and moist.  Nares patent without epistaxis.  External auditory canals are patent tympanic membranes intact.  Neck: supple, no JVD, trachea midline.  No thyromegaly  Lungs: CTA, unlabored breathing effort.  Heart: RRR, normal S1 and S2, no S3, no rub.  Abdomen: soft, non-tender, no palpable bladder, present bowel sounds to auscultation ×4.  No guarding or rigidity.  Extremities: no clubbing, cyanosis or edema.  Good range of motion actively and passively.  Symmetric muscle strength and development.  Ambulates independently, slightly antalgic gait, favors full weightbearing to the left knee at times.  Restricted range of motion on A/P ROM of left knee, significant crepitance additionally.  Medial/lateral joint line tenderness noted.  There is an effusion to the medial and lateral gutters of the left knee.  Neuro: normal speech  and mental status.  Cranial nerves II through XII intact.  No anosmia. DTR 2+; proprioception intact.  No focal motor/sensory deficits.        Assessment and Plan      Assessment/Plan:   Diagnoses and all orders for this visit:    1. Benign essential hypertension (Primary)    2. Complicated grieving  -     sertraline (ZOLOFT) 100 MG tablet; Take 2 tablets by mouth Daily.  Dispense: 180 tablet; Refill: 2    3. Arthralgia of left knee  -     Ambulatory Referral to Orthopedic Surgery    4. Primary osteoarthritis of left knee  -     Ambulatory Referral to Orthopedic Surgery    5. Chronic serous otitis media, bilateral    6. Chronic allergic rhinitis  -     hydrOXYzine pamoate (VISTARIL) 25 MG capsule; Take 1 capsule by mouth Every Night.  Dispense: 90 capsule; Refill: 2    7. Primary insomnia  -     hydrOXYzine pamoate (VISTARIL) 25 MG capsule; Take 1 capsule by mouth Every Night.  Dispense: 90 capsule; Refill: 2    Other orders  -     lidocaine (LIDODERM) 5 %; Place 1 patch on the skin as directed by provider Daily. Remove & Discard patch within 12 hours or as directed by MD  Dispense: 14 each; Refill: 2      I have placed a referral to be seen by orthopedic surgery for evaluation/treatment options.    Plan utilization of hydroxyzine to aid in insomnia.  Begin with 25 mg dosing, may increase if tolerated/needed.  She will notify the office of any dose adjustment needed.    Patient is provided a lidocaine patch for as needed use.  Plan to follow clinically.    Planned utilization of sertraline, plan to follow clinically.  Did well with 50 mg dosing in the past, certainly tolerated.  Plan to increase to 100 mg and follow clinically.  Discussed need for stress/anxiety reducing techniques such as prayer/meditation/breathing and counting exercises and avoidance of stress producing environments/situations; will follow clinically.    Vital signs demonstrate hemodynamic stability, although diastolic blood pressure remains  above goal.  Of asked that she routinely monitor blood pressure at home, keep a log of those readings and bring with her to follow-up visit.  Utilize low-sodium/salt/caffeine dietary intake.  I am hopeful that her blood pressure will improve as her sleep improves.    Discussion Summary:    Discussed plan of care in detail with pt today; pt verb understanding and agrees.    I spent 35 minutes caring for Elli on this date of service. This time includes time spent by me in the following activities:preparing for the visit, performing a medically appropriate examination and/or evaluation , counseling and educating the patient/family/caregiver, ordering medications, tests, or procedures, referring and communicating with other health care professionals , documenting information in the medical record, and care coordination    I confirm accuracy of unchanged data/findings which have been carried forward from previous visit, as well as I have updated appropriately those that have changed.        Follow up:  Return in about 2 months (around 2/27/2025) for Medicare Wellness, Subsequent.     There are no Patient Instructions on file for this visit.        Avery Blackburn DO  01/15/25  12:49 EST

## 2025-01-09 DIAGNOSIS — J30.9 CHRONIC ALLERGIC RHINITIS: ICD-10-CM

## 2025-01-09 DIAGNOSIS — H65.23 CHRONIC SEROUS OTITIS MEDIA, BILATERAL: ICD-10-CM

## 2025-01-09 RX ORDER — MONTELUKAST SODIUM 10 MG/1
10 TABLET ORAL NIGHTLY
Qty: 90 TABLET | Refills: 2 | OUTPATIENT
Start: 2025-01-09

## 2025-01-09 NOTE — TELEPHONE ENCOUNTER
Rx Refill Note  Requested Prescriptions     Pending Prescriptions Disp Refills    montelukast (SINGULAIR) 10 MG tablet [Pharmacy Med Name: MONTELUKAST SOD 10 MG TAB 10 Tablet] 90 tablet 2     Sig: TAKE 1 TABLET BY MOUTH EVERY NIGHT.      Last office visit with prescribing clinician: 12/27/2024   Last telemedicine visit with prescribing clinician: Visit date not found   Next office visit with prescribing clinician: 2/14/2025                         Would you like a call back once the refill request has been completed: [] Yes [] No    If the office needs to give you a call back, can they leave a voicemail: [] Yes [] No    Zulma Youngblood MA  01/09/25, 12:38 EST

## 2025-01-13 DIAGNOSIS — M25.562 ARTHRALGIA OF LEFT KNEE: Primary | ICD-10-CM

## 2025-01-14 ENCOUNTER — OFFICE VISIT (OUTPATIENT)
Dept: ORTHOPEDIC SURGERY | Facility: CLINIC | Age: 75
End: 2025-01-14
Payer: MEDICARE

## 2025-01-14 VITALS
HEIGHT: 62 IN | WEIGHT: 205 LBS | BODY MASS INDEX: 37.73 KG/M2 | SYSTOLIC BLOOD PRESSURE: 142 MMHG | DIASTOLIC BLOOD PRESSURE: 88 MMHG

## 2025-01-14 DIAGNOSIS — M25.562 ARTHRALGIA OF LEFT KNEE: ICD-10-CM

## 2025-01-14 DIAGNOSIS — M17.12 ARTHRITIS OF KNEE, LEFT: ICD-10-CM

## 2025-01-14 DIAGNOSIS — M25.662 KNEE STIFFNESS, LEFT: Primary | ICD-10-CM

## 2025-01-14 DIAGNOSIS — M17.12 ARTHRITIS OF KNEE, LEFT: Primary | ICD-10-CM

## 2025-01-14 PROCEDURE — 3079F DIAST BP 80-89 MM HG: CPT | Performed by: PHYSICIAN ASSISTANT

## 2025-01-14 PROCEDURE — 3077F SYST BP >= 140 MM HG: CPT | Performed by: PHYSICIAN ASSISTANT

## 2025-01-14 PROCEDURE — 1159F MED LIST DOCD IN RCRD: CPT | Performed by: PHYSICIAN ASSISTANT

## 2025-01-14 PROCEDURE — 99213 OFFICE O/P EST LOW 20 MIN: CPT | Performed by: PHYSICIAN ASSISTANT

## 2025-01-14 PROCEDURE — 1160F RVW MEDS BY RX/DR IN RCRD: CPT | Performed by: PHYSICIAN ASSISTANT

## 2025-01-14 RX ORDER — HYALURONATE SODIUM 30 MG/2 ML
30 SYRINGE (ML) INTRAARTICULAR WEEKLY
Qty: 6.09 ML | Refills: 0 | Status: SHIPPED | OUTPATIENT
Start: 2025-01-14 | End: 2025-01-29

## 2025-01-14 NOTE — PROGRESS NOTES
"Subjective   Patient ID: Elli Edwards is a 74 y.o. right hand dominant female is being seen for orthopaedic evaluation today for left knee pain  Pain of the Left Knee (Reports pain for years and its progressively getting worse. States \"it has no cartilage\" )         Patient presents for the evaluation of left knee stiffness that seems to be worsening over the last several years.  There has been no recent injury or trauma.  She states the stiffness affects her ability to fully flex the knee.  She mentions trying cortisone injections in the past but does not recall the most recent injection providing relief.                                             Past Medical History:   Diagnosis Date    Arthritis     Blisters of multiple sites     lower back    Cancer     \"skin cancer\"    Cataract     PT REPORTS BILATERAL EYES    Colon polyp 12/03/2015    Colon polyps 10/08/2019    Depression     Deviated septum     has to have breathe [employer] or tape on nose for surgery    Diarrhea     Diverticulosis     Dry eyes     Elevated cholesterol     Frequency of urination     GERD (gastroesophageal reflux disease)     H/O cardiovascular stress test 10/07/2019    10+ years ago     Hearing loss     Patient reported no use of hearing aids    Hip pain     right hip    Hypertension     Hypothyroid     Impaired functional mobility, balance, gait, and endurance     Localized osteoarthritis of right knee     Piercing     ears only    Seasonal allergies     allergic to trees, grass/foxfire burns    Tinnitus     Wears glasses         Past Surgical History:   Procedure Laterality Date    ADENOIDECTOMY      CATARACT EXTRACTION Bilateral     CATARACT EXTRACTION W/ INTRAOCULAR LENS IMPLANT Right 02/02/2017    Procedure: RIGHT CATARACT PHACO EXTRACTION WITH INTRAOCULAR LENS IMPLANT;  Surgeon: Miky Garza MD;  Location: [facility];  Service:     CATARACT EXTRACTION W/ INTRAOCULAR LENS IMPLANT Left 02/16/2017    Procedure: LEFT CATARACT PHACO " "EXTRACTION WITH INTRAOCULAR LENS IMPLANT;  Surgeon: Miky Garza MD;  Location: [facility] OR;  Service:     CHOLECYSTECTOMY N/A 08/06/2019    Procedure: CHOLECYSTECTOMY LAPAROSCOPIC;  Surgeon: Oliver Aguilar MD;  Location: [facility] OR;  Service: General    COLONOSCOPY  2016    COLONOSCOPY N/A 10/08/2019    Procedure: COLONOSCOPY WITH POLYPECTOMY;  Surgeon: Lemuel Sagastume MD;  Location: [facility] ENDOSCOPY;  Service: Gastroenterology    COLONOSCOPY N/A 11/01/2022    Procedure: COLONOSCOPY with biopsy and polypectomy;  Surgeon: Deanna Monteiro MD;  Location: [facility] ENDOSCOPY;  Service: Gastroenterology;  Laterality: N/A;    ENDOSCOPY      LASER ABLATION      cervical    MOUTH SURGERY      SEVERAL WISDOM TEETH EXTRACTED    MOUTH SURGERY      ORAL IMPLANT    TONSILLECTOMY      AT AGE 5    TOTAL HIP ARTHROPLASTY Right 08/04/2020    Procedure: Total hip arthroplasty RIGHT (BIOMET);  Surgeon: Justice Peoples MD;  Location: [facility] OR;  Service: Orthopedics;  Laterality: Right;       Family History   Problem Relation Age of Onset    Prostate cancer Father     Colon cancer Paternal Grandmother     Hyperlipidemia Other     Thyroid cancer Mother     Breast cancer Neg Hx     Ovarian cancer Neg Hx         Social History     Socioeconomic History    Marital status:    Tobacco Use    Smoking status: Never     Passive exposure: Never    Smokeless tobacco: Never   Vaping Use    Vaping status: Never Used   Substance and Sexual Activity    Alcohol use: No     Comment: RARELY DRINKS, 2-3 X MONTHLY    Drug use: No    Sexual activity: Defer       Allergies   Allergen Reactions    Codeine Nausea And Vomiting and Dizziness       Review of Systems   Musculoskeletal:  Positive for arthralgias (left knee) and joint swelling (left knee).       Objective   /88   Ht 157.5 cm (62\")   Wt 93 kg (205 lb)   BMI 37.49 kg/m²   Physical Exam  Vitals and nursing note reviewed.   Pulmonary:      Effort: Pulmonary effort is normal. "   Musculoskeletal:      Left knee: Swelling, bony tenderness (medial joint) and crepitus present. No erythema or ecchymosis. No LCL laxity, MCL laxity or ACL laxity.  Neurological:      Mental Status: She is oriented to person, place, and time.       Left Knee Exam     Range of Motion   Extension:  -5   Flexion:  100     Tests   Patellar apprehension: trace    Other   Erythema: absent  Sensation: normal          Extremity DVT signs are negative by clinical screen.   Neurological Exam  Mental Status   Oriented to person, place, and time.           Assessment & Plan   Independent Review of Radiographic Studies:    AP standing of both knees and lateral of left knee taken in the office today,  indication to evaluate joint condition, without prior comparison views, shows evident chronic advanced osteoarthritis.      Procedures      Diagnoses and all orders for this visit:    1. Knee stiffness, left (Primary)    2. Arthritis of knee, left       Discussion of orthopedic goals  Orthopedic activities reviewed and patient expressed appreciation  Regular exercise as tolerated  Risk, benefits, and merits of treatment alternatives reviewed with the patient and questions answered  Ice, heat, and/or modalities as beneficial    Recommendations/Plan:  Exercise, medications, injections, other patient advice, and return appointment as noted.  Patient is encouraged to call or return for any issues or concerns.  Patient politely declined intra-articular cortisone injection today stating she is not here for pain control.  We discussed option of formal PT  for leg strengthening, mobilization etc. She feels that she does enough Exercises at home and would hold off on this option.    I will start the authorization process for left knee visco injection.   We will contact patient once we receive approval.       Patient agreeable to call or return sooner for any concerns.

## 2025-01-17 ENCOUNTER — TELEPHONE (OUTPATIENT)
Dept: ORTHOPEDIC SURGERY | Facility: CLINIC | Age: 75
End: 2025-01-17
Payer: MEDICARE

## 2025-01-17 NOTE — TELEPHONE ENCOUNTER
Provider: TRELL    Caller: Elli Edwards Law    Relationship to Patient: Self    Pharmacy:     Phone Number: [phone]    Reason for Call: PT CALLED AND STATED THAT SHE WILL NOT DO THE GEL INJS DUE TO THE COST OF THE COPAY BUT WILL REACH OUT TO HER INS TO CHECK

## 2025-02-11 ENCOUNTER — HOSPITAL ENCOUNTER (OUTPATIENT)
Dept: MAMMOGRAPHY | Facility: HOSPITAL | Age: 75
Discharge: HOME | End: 2025-02-11
Admitting: FAMILY MEDICINE
Payer: MEDICARE

## 2025-02-11 DIAGNOSIS — Z12.31 SCREENING MAMMOGRAM FOR BREAST CANCER: ICD-10-CM

## 2025-02-11 PROCEDURE — 77067 SCR MAMMO BI INCL CAD: CPT

## 2025-02-11 PROCEDURE — 77063 BREAST TOMOSYNTHESIS BI: CPT

## 2025-02-13 PROCEDURE — 77063 BREAST TOMOSYNTHESIS BI: CPT | Performed by: RADIOLOGY

## 2025-02-13 PROCEDURE — 77067 SCR MAMMO BI INCL CAD: CPT | Performed by: RADIOLOGY

## 2025-02-14 ENCOUNTER — OFFICE VISIT (OUTPATIENT)
Age: 75
End: 2025-02-14
Payer: MEDICARE

## 2025-02-14 VITALS
DIASTOLIC BLOOD PRESSURE: 93 MMHG | BODY MASS INDEX: 33.29 KG/M2 | SYSTOLIC BLOOD PRESSURE: 124 MMHG | TEMPERATURE: 97.7 F | WEIGHT: 182 LBS | OXYGEN SATURATION: 92 % | HEART RATE: 90 BPM

## 2025-02-14 DIAGNOSIS — M25.562 ARTHRALGIA OF LEFT KNEE: ICD-10-CM

## 2025-02-14 DIAGNOSIS — I10 BENIGN ESSENTIAL HYPERTENSION: ICD-10-CM

## 2025-02-14 DIAGNOSIS — Z91.81 AT HIGH RISK FOR FALLS: ICD-10-CM

## 2025-02-14 DIAGNOSIS — E55.9 VITAMIN D DEFICIENCY: ICD-10-CM

## 2025-02-14 DIAGNOSIS — M17.12 PRIMARY OSTEOARTHRITIS OF LEFT KNEE: ICD-10-CM

## 2025-02-14 DIAGNOSIS — Z00.00 MEDICARE ANNUAL WELLNESS VISIT, SUBSEQUENT: Primary | ICD-10-CM

## 2025-02-14 DIAGNOSIS — E78.5 DYSLIPIDEMIA: Chronic | ICD-10-CM

## 2025-02-14 PROCEDURE — 1126F AMNT PAIN NOTED NONE PRSNT: CPT | Performed by: FAMILY MEDICINE

## 2025-02-14 PROCEDURE — G0439 PPPS, SUBSEQ VISIT: HCPCS | Performed by: FAMILY MEDICINE

## 2025-02-14 PROCEDURE — 96160 PT-FOCUSED HLTH RISK ASSMT: CPT | Performed by: FAMILY MEDICINE

## 2025-02-14 PROCEDURE — 3074F SYST BP LT 130 MM HG: CPT | Performed by: FAMILY MEDICINE

## 2025-02-14 PROCEDURE — 3080F DIAST BP >= 90 MM HG: CPT | Performed by: FAMILY MEDICINE

## 2025-02-14 RX ORDER — BROMPHENIRAMINE MALEATE, PSEUDOEPHEDRINE HYDROCHLORIDE, AND DEXTROMETHORPHAN HYDROBROMIDE 2; 30; 10 MG/5ML; MG/5ML; MG/5ML
5 SYRUP ORAL 3 TIMES DAILY PRN
Qty: 118 ML | Refills: 0 | Status: SHIPPED | OUTPATIENT
Start: 2025-02-14

## 2025-02-14 NOTE — PROGRESS NOTES
Subjective   The ABCs of the Annual Wellness Visit  Medicare Wellness Visit      Elli Edwards is a 74 y.o. patient who presents for a Medicare Wellness Visit.    Recent Covid URI; still dealing with congestion/cough.  No F/C.      The following portions of the patient's history were reviewed and   updated as appropriate: allergies, current medications, past family history, past medical history, past social history, past surgical history, and problem list.    Compared to one year ago, the patient's physical   health is the same.  Compared to one year ago, the patient's mental   health is the same.    Recent Hospitalizations:  She was not admitted to the hospital during the last year.     Current Medical Providers:  Patient Care Team:  Avery Blackburn,  as PCP - General (Family Medicine)    Outpatient Medications Prior to Visit   Medication Sig Dispense Refill    atorvastatin (LIPITOR) 40 MG tablet TAKE 1 TABLET BY MOUTH DAILY. 90 tablet 3    hydrOXYzine pamoate (VISTARIL) 25 MG capsule Take 1 capsule by mouth Every Night. 90 capsule 2    lidocaine (LIDODERM) 5 % Place 1 patch on the skin as directed by provider Daily. Remove & Discard patch within 12 hours or as directed by MD 14 each 2    lisinopril-hydrochlorothiazide (PRINZIDE,ZESTORETIC) 20-12.5 MG per tablet TAKE 1 TABLET BY MOUTH DAILY. 90 tablet 3    sertraline (ZOLOFT) 100 MG tablet Take 2 tablets by mouth Daily. 180 tablet 2    vitamin D (ERGOCALCIFEROL) 1.25 MG (80042 UT) capsule capsule TAKE 1 CAPSULE BY MOUTH 1 (ONE) TIME PER WEEK. 8 capsule 2     No facility-administered medications prior to visit.     No opioid medication identified on active medication list. I have reviewed chart for other potential  high risk medication/s and harmful drug interactions in the elderly.      Aspirin is not on active medication list.  Aspirin use is not indicated based on review of current medical condition/s. Risk of harm outweighs potential benefits.  .    Patient  Active Problem List   Diagnosis    Benign essential hypertension    Polyp of colon    Heartburn    Dyslipidemia    Phlebitis    Vertigo    Vitamin D deficiency    Internal hemorrhoids    Diverticulosis of colon    Diarrhea    Arthralgia of right hip    Primary osteoarthritis of right hip    Status post total hip replacement, right    Personal history of colonic polyps    Complicated grieving    Chronic allergic rhinitis    Chronic serous otitis media, bilateral    Mixed stress and urge incontinence    Primary insomnia    Primary osteoarthritis of left knee    Arthralgia of left knee     Advance Care Planning Advance Directive is not on file.  ACP discussion was held with the patient during this visit. Patient does not have an advance directive, information provided.      Review of Systems  Constitutional: Negative for fever. Negative for chills, diaphoresis, fatigue and unexpected weight change.   HENT: No dysphagia; no changes to vision/hearing/smell/taste; no epistaxis  Eyes: Negative for redness and visual disturbance.   Respiratory: negative for shortness of breath. Negative for chest pain . Negative for cough and chest tightness.   Cardiovascular: Negative for chest pain and palpitations.   Gastrointestinal: Negative for abdominal distention, abdominal pain and blood in stool.   Endocrine: Negative for cold intolerance and heat intolerance.   Genitourinary: Chronic urinary incontinence.  Musculoskeletal: Chronic arthralgias, without back pain and myalgias.   Skin: Negative for color change, rash and wound.   Neurological: Negative for syncope, weakness and headaches.   Hematological: Negative for adenopathy. Does not bruise/bleed easily.   Psychiatric/Behavioral: Negative for confusion. The patient is not nervous/anxious.      Objective   Vitals:    02/14/25 0946   BP: 124/93   BP Location: Left arm   Patient Position: Sitting   Pulse: 90   Temp: 97.7 °F (36.5 °C)   TempSrc: Temporal   SpO2: 92%   Weight: 82.6  "kg (182 lb)   PainSc: 0-No pain     General Appearance: alert, oriented x 3, no acute distress.  During questioning/examination.  Well-nourished and developed female.  Skin: warm and dry.   HEENT: Atraumatic.  pupils round and reactive to light and accommodation, oral mucosa pink and moist.  Nares patent without epistaxis.  External auditory canals are patent tympanic membranes intact.  Neck: supple, no JVD, trachea midline.  No thyromegaly  Lungs: CTA, unlabored breathing effort.  Heart: RRR, normal S1 and S2, no S3, no rub.  Abdomen: soft, non-tender, no palpable bladder, present bowel sounds to auscultation ×4.  No guarding or rigidity.  Extremities: no clubbing, cyanosis or edema.  Good range of motion actively and passively.  Symmetric muscle strength and development.  Ambulates independently, slightly antalgic gait, favors full weightbearing to the left knee at times.  Restricted range of motion on A/P ROM of left knee, significant crepitance additionally.  Medial/lateral joint line tenderness noted.  There is an effusion to the medial and lateral gutters of the left knee.  Neuro: normal speech and mental status.  Cranial nerves II through XII intact.  No anosmia. DTR 2+; proprioception intact.  No focal motor/sensory deficits.    Estimated body mass index is 33.29 kg/m² as calculated from the following:    Height as of 1/14/25: 157.5 cm (62\").    Weight as of this encounter: 82.6 kg (182 lb).                Does the patient have evidence of cognitive impairment? No                                                                                               Health  Risk Assessment    Smoking Status:  Social History     Tobacco Use   Smoking Status Never    Passive exposure: Never   Smokeless Tobacco Never     Alcohol Consumption:  Social History     Substance and Sexual Activity   Alcohol Use No    Comment: RARELY DRINKS, 2-3 X MONTHLY       Fall Risk Screen  STEADI Fall Risk Assessment was completed, and " patient is at HIGH risk for falls. Assessment completed on:2025    Depression Screening   Little interest or pleasure in doing things? Several days   Feeling down, depressed, or hopeless? Not at all   PHQ-2 Total Score 1      Health Habits and Functional and Cognitive Screenin/14/2025     9:57 AM   Functional & Cognitive Status   Do you have difficulty preparing food and eating? No   Do you have difficulty bathing yourself, getting dressed or grooming yourself? No   Do you have difficulty using the toilet? No   Do you have difficulty moving around from place to place? No   Do you have trouble with steps or getting out of a bed or a chair? No   Current Diet Well Balanced Diet   Dental Exam Up to date   Eye Exam Up to date   Exercise (times per week) 3 times per week   Current Exercises Include Walking   Do you need help using the phone?  No   Are you deaf or do you have serious difficulty hearing?  No   Do you need help to go to places out of walking distance? No   Do you need help shopping? No   Do you need help preparing meals?  No   Do you need help with housework?  No   Do you need help with laundry? No   Do you need help taking your medications? No   Do you need help managing money? No   Do you ever drive or ride in a car without wearing a seat belt? No   Have you felt unusual stress, anger or loneliness in the last month? No   Who do you live with? Alone   If you need help, do you have trouble finding someone available to you? No   Have you been bothered in the last four weeks by sexual problems? No   Do you have difficulty concentrating, remembering or making decisions? No           Age-appropriate Screening Schedule:  Refer to the list below for future screening recommendations based on patient's age, sex and/or medical conditions. Orders for these recommended tests are listed in the plan section. The patient has been provided with a written plan.    Health Maintenance List  Health Maintenance    Topic Date Due    DXA SCAN  Never done    TDAP/TD VACCINES (1 - Tdap) Never done    ZOSTER VACCINE (1 of 2) Never done    HEPATITIS C SCREENING  Never done    LIPID PANEL  08/03/2024    COVID-19 Vaccine (5 - 2024-25 season) 09/01/2024    RSV Vaccine - Adults (1 - 1-dose 75+ series) Never done    ANNUAL WELLNESS VISIT  02/13/2025    BMI FOLLOWUP  12/27/2025    COLORECTAL CANCER SCREENING  11/01/2027    INFLUENZA VACCINE  Completed    Pneumococcal Vaccine 50+  Completed    MAMMOGRAM  Discontinued                                                                                                                                                CMS Preventative Services Quick Reference  Risk Factors Identified During Encounter  None Identified    The above risks/problems have been discussed with the patient.  Pertinent information has been shared with the patient in the After Visit Summary.  An After Visit Summary and PPPS were made available to the patient.    Follow Up:  Next Medicare Wellness visit to be scheduled in 1 year.     Assessment & Plan  Medicare annual wellness visit, subsequent    Orders:    Comprehensive metabolic panel    CBC w AUTO Differential  I have discussed age/gender specific preventative healthcare issues in detail with patient today.  I have answered all of the questions.  Primary osteoarthritis of left knee       Continues to be followed by orthopedic surgery.  Arthralgia of left knee         Benign essential hypertension      Orders:    Comprehensive metabolic panel    CBC w AUTO Differential  Vital signs demonstrate hemodynamic stability.  Dyslipidemia    Orders:    Comprehensive metabolic panel    Vitamin D deficiency    Orders:    Comprehensive metabolic panel    Vitamin D 25 hydroxy    At high risk for falls              Follow Up:  Return in about 6 months (around 8/14/2025).    Pt to RTC in a few weeks to have labs done       I confirm accuracy of unchanged data/findings which have been  carried forward from previous visit, as well as I have updated appropriately those that have changed.

## 2025-02-14 NOTE — PATIENT INSTRUCTIONS
Advance Care Planning and Advance Directives     You make decisions on a daily basis - decisions about where you want to live, your career, your home, your life. Perhaps one of the most important decisions you face is your choice for future medical care. Take time to talk with your family and your healthcare team and start planning today.  Advance Care Planning is a process that can help you:  Understand possible future healthcare decisions in light of your own experiences  Reflect on those decision in light of your goals and values  Discuss your decisions with those closest to you and the healthcare professionals that care for you  Make a plan by creating a document that reflects your wishes    Surrogate Decision Maker  In the event of a medical emergency, which has left you unable to communicate or to make your own decisions, you would need someone to make decisions for you.  It is important to discuss your preferences for medical treatment with this person while you are in good health.     Qualities of a surrogate decision maker:  Willing to take on this role and responsibility  Knows what you want for future medical care  Willing to follow your wishes even if they don't agree with them  Able to make difficult medical decisions under stressful circumstances    Advance Directives  These are legal documents you can create that will guide your healthcare team and decision maker(s) when needed. These documents can be stored in the electronic medical record.    Living Will - a legal document to guide your care if you have a terminal condition or a serious illness and are unable to communicate. States vary by statute in document names/types, but most forms may include one or more of the following:        -  Directions regarding life-prolonging treatments        -  Directions regarding artificially provided nutrition/hydration        -  Choosing a healthcare decision maker        -  Direction regarding organ/tissue  donation    Durable Power of  for Healthcare - this document names an -in-fact to make medical decisions for you, but it may also allow this person to make personal and financial decisions for you. Please seek the advice of an  if you need this type of document.    **Advance Directives are not required and no one may discriminate against you if you do not sign one.    Medical Orders  Many states allow specific forms/orders signed by your physician to record your wishes for medical treatment in your current state of health. This form, signed in personal communication with your physician, addresses resuscitation and other medical interventions that you may or may not want.      For more information or to schedule a time with a [facility] Advance Care Planning Facilitator contact: [facility] or call [phone] and someone will contact you directly.  Fall Prevention in the Home, Adult  Falls can cause injuries and affect people of all ages. There are many simple things that you can do to make your home safe and to help prevent falls.  If you need it, ask for help making these changes.  What actions can I take to prevent falls?  General information  Use good lighting in all rooms. Make sure to:  Replace any light bulbs that burn out.  Turn on lights if it is dark and use night-lights.  Keep items that you use often in easy-to-reach places. Lower the shelves around your home if needed.  Move furniture so that there are clear paths around it.  Do not keep throw rugs or other things on the floor that can make you trip.  If any of your floors are uneven, fix them.  Add color or contrast paint or tape to clearly renu and help you see:  Grab bars or handrails.  First and last steps of staircases.  Where the edge of each step is.  If you use a ladder or stepladder:  Make sure that it is fully opened. Do not climb a closed ladder.  Make sure the sides of the ladder are locked in  place.  Have someone hold the ladder while you use it.  Know where your pets are as you move through your home.  What can I do in the bathroom?         Keep the floor dry. Clean up any water that is on the floor right away.  Remove soap buildup in the bathtub or shower. Buildup makes bathtubs and showers slippery.  Use non-skid mats or decals on the floor of the bathtub or shower.  Attach bath mats securely with double-sided, non-slip rug tape.  If you need to sit down while you are in the shower, use a non-slip stool.  Install grab bars by the toilet and in the bathtub and shower. Do not use towel bars as grab bars.  What can I do in the bedroom?  Make sure that you have a light by your bed that is easy to reach.  Do not use any sheets or blankets on your bed that hang to the floor.  Have a firm bench or chair with side arms that you can use for support when you get dressed.  What can I do in the kitchen?  Clean up any spills right away.  If you need to reach something above you, use a sturdy step stool that has a grab bar.  Keep electrical cables out of the way.  Do not use floor polish or wax that makes floors slippery.  What can I do with my stairs?  Do not leave anything on the stairs.  Make sure that you have a light switch at the top and the bottom of the stairs. Have them installed if you do not have them.  Make sure that there are handrails on both sides of the stairs. Fix handrails that are broken or loose. Make sure that handrails are as long as the staircases.  Install non-slip stair treads on all stairs in your home if they do not have carpet.  Avoid having throw rugs at the top or bottom of stairs, or secure the rugs with carpet tape to prevent them from moving.  Choose a carpet design that does not hide the edge of steps on the stairs. Make sure that carpet is firmly attached to the stairs. Fix any carpet that is loose or worn.  What can I do on the outside of my home?  Use bright outdoor  lighting.  Repair the edges of walkways and driveways and fix any cracks. Clear paths of anything that can make you trip, such as tools or rocks.  Add color or contrast paint or tape to clearly renu and help you see high doorway thresholds.  Trim any bushes or trees on the main path into your home.  Check that handrails are securely fastened and in good repair. Both sides of all steps should have handrails.  Install guardrails along the edges of any raised decks or porches.  Have leaves, snow, and ice cleared regularly. Use sand, salt, or ice melt on walkways during winter months if you live where there is ice and snow.  In the garage, clean up any spills right away, including grease or oil spills.  What other actions can I take?  Review your medicines with your health care provider. Some medicines can make you confused or feel dizzy. This can increase your chance of falling.  Wear closed-toe shoes that fit well and support your feet. Wear shoes that have rubber soles and low heels.  Use a cane, walker, scooter, or crutches that help you move around if needed.  Talk with your provider about other ways that you can decrease your risk of falls. This may include seeing a physical therapist to learn to do exercises to improve movement and strength.  Where to find more information  Centers for Disease Control and PreventionCHARLES: cdc.gov  National [place] on Aging: brisa.nih.gov  National [place] on Aging: brisa.nih.gov  Contact a health care provider if:  You are afraid of falling at home.  You feel weak, drowsy, or dizzy at home.  You fall at home.  Get help right away if you:  Lose consciousness or have trouble moving after a fall.  Have a fall that causes a head injury.  These symptoms may be an emergency. Get help right away. Call 911.  Do not wait to see if the symptoms will go away.  Do not drive yourself to the hospital.  This information is not intended to replace advice given to you by your health care  provider. Make sure you discuss any questions you have with your health care provider.  Document Revised: 08/21/2023 Document Reviewed: 08/21/2023  Elsevier Patient Education © 2024 [employer]  Sit-to-Stand Exercise    The sit-to-stand exercise (also known as the chair stand or chair rise exercise) strengthens your lower body and helps you maintain or improve your mobility and independence. The end goal is to do the sit-to-stand exercise without using your hands. This will be easier as you become stronger. You should always talk with your health care provider before starting any exercise program, especially if you have had recent surgery.  Do the exercise exactly as told by your health care provider and adjust it as directed. It is normal to feel mild stretching, pulling, tightness, or discomfort as you do this exercise, but you should stop right away if you feel sudden pain or your pain gets worse. Do not begin doing this exercise until told by your health care provider.  What the sit-to-stand exercise does  The sit-to-stand exercise helps to strengthen the muscles in your thighs and the muscles in the center of your body that give you stability (core muscles). This exercise is especially helpful if:  You have had knee or hip surgery.  You have trouble getting up from a chair, out of a car, or off the toilet due to muscle weakness.  How to do the sit-to-stand exercise  Sit toward the front edge of a sturdy chair without armrests. Your knees should be bent and your feet should be flat on the floor and shoulder-width apart and underneath your hips.  Place your hands lightly on each side of the seat. Keep your back and neck as straight as possible, with your chest slightly forward.  Breathe in slowly. Lean forward and slightly shift your weight to the front of your feet.  Breathe out as you slowly stand up. Try not to support any weight with your hands.  Stand and pause for a full breath in and out.  Breathe in as  you sit down slowly. Tighten your core and abdominal muscles to control your lowering as much as possible. You should lower yourself back to the chair slowly, not just drop back into the seat.  Breathe out slowly.  Do this exercise 10-15 times. If needed, do it fewer times until you build up strength.  Rest for 1 minute, then do another set of 10-15 repetitions.  To change the difficulty of the sit-to-stand exercise  If the exercise is too difficult, use a chair with sturdy armrests, and push off the armrests to help you come to the standing position. You can also use the armrests to help slowly lower yourself back to sitting. As this gets easier, try to use your arms less. You can also place a firm cushion or pillow on the chair to make the surface higher.  If this exercise is too easy, do not use your arms to help raise or lower yourself. You can also wear a weighted vest, use hand weights, increase your repetitions, or try a lower chair.  General tips  You may feel tired when starting an exercise routine. This is normal.  You may have muscle soreness that lasts a few days. This is normal. As you get stronger, you may not feel muscle soreness.  Use smooth, steady movements.  Do not  hold your breath during strength exercises. This can cause unsafe changes in your blood pressure.  Breathe in slowly through your nose, and breathe out slowly through your mouth.  Summary  Strengthening your lower body is an important step to help you move safely and independently.  The sit-to-stand exercise helps strengthen the muscles in your thighs and core.  You should always talk with your health care provider before starting any exercise program, especially if you have had recent surgery.  This information is not intended to replace advice given to you by your health care provider. Make sure you discuss any questions you have with your health care provider.  Document Revised: 04/10/2022 Document Reviewed: 04/10/2022  [employer]  Patient Education 2024 Elsevier Inc.    Medicare Wellness  Personal Prevention Plan of Service     Date of Office Visit:    Encounter Provider:  Avery Blackburn DO  Place of Service:  [facility]  Patient Name: Elli Edwards  :  1950    As part of the Medicare Wellness portion of your visit today, we are providing you with this personalized preventive plan of services (PPPS). This plan is based upon recommendations of the [place] Preventive Services Task Force (USPSTF) and the Advisory Committee on Immunization Practices (ACIP).    This lists the preventive care services that should be considered, and provides dates of when you are due. Items listed as completed are up-to-date and do not require any further intervention.    Health Maintenance   Topic Date Due   • DXA SCAN  Never done   • TDAP/TD VACCINES (1 - Tdap) Never done   • ZOSTER VACCINE (1 of 2) Never done   • HEPATITIS C SCREENING  Never done   • LIPID PANEL  2024   • COVID-19 Vaccine ( season) 2024   • RSV Vaccine - Adults (1 - 1-dose 75+ series) Never done   • ANNUAL WELLNESS VISIT  2025   • BMI FOLLOWUP  2025   • COLORECTAL CANCER SCREENING  2027   • INFLUENZA VACCINE  Completed   • Pneumococcal Vaccine 50+  Completed   • MAMMOGRAM  Discontinued       No orders of the defined types were placed in this encounter.      No follow-ups on file.

## 2025-02-14 NOTE — ASSESSMENT & PLAN NOTE
Orders:    Comprehensive metabolic panel    CBC w AUTO Differential  Vital signs demonstrate hemodynamic stability.

## 2025-02-24 ENCOUNTER — LAB (OUTPATIENT)
Age: 75
End: 2025-02-24
Payer: MEDICARE

## 2025-02-24 PROCEDURE — 82306 VITAMIN D 25 HYDROXY: CPT | Performed by: FAMILY MEDICINE

## 2025-02-24 PROCEDURE — 80053 COMPREHEN METABOLIC PANEL: CPT | Performed by: FAMILY MEDICINE

## 2025-02-24 PROCEDURE — 85025 COMPLETE CBC W/AUTO DIFF WBC: CPT | Performed by: FAMILY MEDICINE

## 2025-02-24 PROCEDURE — 36415 COLL VENOUS BLD VENIPUNCTURE: CPT | Performed by: FAMILY MEDICINE

## 2025-02-26 LAB
25(OH)D3+25(OH)D2 SERPL-MCNC: 43.5 NG/ML (ref 30–100)
ALBUMIN SERPL-MCNC: 4.3 G/DL (ref 3.8–4.8)
ALP SERPL-CCNC: 97 IU/L (ref 44–121)
ALT SERPL-CCNC: 18 IU/L (ref 0–32)
AST SERPL-CCNC: 19 IU/L (ref 0–40)
BASOPHILS # BLD AUTO: 0.1 X10E3/UL (ref 0–0.2)
BASOPHILS NFR BLD AUTO: 1 %
BILIRUB SERPL-MCNC: 0.4 MG/DL (ref 0–1.2)
BUN SERPL-MCNC: 17 MG/DL (ref 8–27)
BUN/CREAT SERPL: 19 (ref 12–28)
CALCIUM SERPL-MCNC: 9.3 MG/DL (ref 8.7–10.3)
CHLORIDE SERPL-SCNC: 104 MMOL/L (ref 96–106)
CO2 SERPL-SCNC: 19 MMOL/L (ref 20–29)
CREAT SERPL-MCNC: 0.89 MG/DL (ref 0.57–1)
EGFRCR SERPLBLD CKD-EPI 2021: 68 ML/MIN/1.73
EOSINOPHIL # BLD AUTO: 0.1 X10E3/UL (ref 0–0.4)
EOSINOPHIL NFR BLD AUTO: 2 %
ERYTHROCYTE [DISTWIDTH] IN BLOOD BY AUTOMATED COUNT: 12.8 % (ref 11.7–15.4)
GLOBULIN SER CALC-MCNC: 2.6 G/DL (ref 1.5–4.5)
GLUCOSE SERPL-MCNC: 102 MG/DL (ref 70–99)
HCT VFR BLD AUTO: 47.9 % (ref 34–46.6)
HGB BLD-MCNC: 15.7 G/DL (ref 11.1–15.9)
IMM GRANULOCYTES # BLD AUTO: 0 X10E3/UL (ref 0–0.1)
IMM GRANULOCYTES NFR BLD AUTO: 0 %
LYMPHOCYTES # BLD AUTO: 1.8 X10E3/UL (ref 0.7–3.1)
LYMPHOCYTES NFR BLD AUTO: 27 %
MCH RBC QN AUTO: 29.5 PG (ref 26.6–33)
MCHC RBC AUTO-ENTMCNC: 32.8 G/DL (ref 31.5–35.7)
MCV RBC AUTO: 90 FL (ref 79–97)
MONOCYTES # BLD AUTO: 0.7 X10E3/UL (ref 0.1–0.9)
MONOCYTES NFR BLD AUTO: 10 %
NEUTROPHILS # BLD AUTO: 4.1 X10E3/UL (ref 1.4–7)
NEUTROPHILS NFR BLD AUTO: 60 %
PLATELET # BLD AUTO: 259 X10E3/UL (ref 150–450)
POTASSIUM SERPL-SCNC: 4.1 MMOL/L (ref 3.5–5.2)
PROT SERPL-MCNC: 6.9 G/DL (ref 6–8.5)
RBC # BLD AUTO: 5.33 X10E6/UL (ref 3.77–5.28)
SODIUM SERPL-SCNC: 142 MMOL/L (ref 134–144)
WBC # BLD AUTO: 6.8 X10E3/UL (ref 3.4–10.8)

## 2025-05-21 DIAGNOSIS — I10 BENIGN ESSENTIAL HYPERTENSION: ICD-10-CM

## 2025-05-22 RX ORDER — LISINOPRIL AND HYDROCHLOROTHIAZIDE 12.5; 2 MG/1; MG/1
1 TABLET ORAL DAILY
Qty: 90 TABLET | Refills: 3 | Status: SHIPPED | OUTPATIENT
Start: 2025-05-22

## 2025-05-22 NOTE — TELEPHONE ENCOUNTER
Rx Refill Note  Requested Prescriptions     Pending Prescriptions Disp Refills    lisinopril-hydrochlorothiazide (PRINZIDE,ZESTORETIC) 20-12.5 MG per tablet [Pharmacy Med Name: LISINOPRIL-HCTZ 20-12.5 MG 20-12.5 Tablet] 90 tablet 3     Sig: TAKE 1 TABLET BY MOUTH DAILY.      Last office visit with prescribing clinician: 2/14/2025   Last telemedicine visit with prescribing clinician: Visit date not found   Next office visit with prescribing clinician: 6/16/2025                         Would you like a call back once the refill request has been completed: [] Yes [] No    If the office needs to give you a call back, can they leave a voicemail: [] Yes [] No    Zulma Youngblood MA  05/22/25, 14:09 EDT

## 2025-06-12 DIAGNOSIS — F43.21 COMPLICATED GRIEVING: ICD-10-CM

## 2025-06-12 RX ORDER — SERTRALINE HYDROCHLORIDE 100 MG/1
200 TABLET, FILM COATED ORAL DAILY
Qty: 180 TABLET | Refills: 2 | Status: SHIPPED | OUTPATIENT
Start: 2025-06-12

## 2025-06-12 NOTE — TELEPHONE ENCOUNTER
Rx Refill Note  Requested Prescriptions      No prescriptions requested or ordered in this encounter      Last office visit with prescribing clinician: 2/14/2025   Last telemedicine visit with prescribing clinician: Visit date not found   Next office visit with prescribing clinician: 6/16/2025                         Would you like a call back once the refill request has been completed: [] Yes [] No    If the office needs to give you a call back, can they leave a voicemail: [] Yes [] No    Zulma Youngblood MA  06/12/25, 16:26 EDT

## 2025-06-16 ENCOUNTER — OFFICE VISIT (OUTPATIENT)
Age: 75
End: 2025-06-16
Payer: MEDICARE

## 2025-06-16 VITALS
HEIGHT: 62 IN | SYSTOLIC BLOOD PRESSURE: 140 MMHG | HEART RATE: 81 BPM | OXYGEN SATURATION: 95 % | DIASTOLIC BLOOD PRESSURE: 80 MMHG | BODY MASS INDEX: 36.25 KG/M2 | WEIGHT: 197 LBS

## 2025-06-16 DIAGNOSIS — E78.5 DYSLIPIDEMIA: Chronic | ICD-10-CM

## 2025-06-16 DIAGNOSIS — I10 BENIGN ESSENTIAL HYPERTENSION: ICD-10-CM

## 2025-06-16 DIAGNOSIS — J30.9 CHRONIC ALLERGIC RHINITIS: Primary | ICD-10-CM

## 2025-06-16 PROCEDURE — 99214 OFFICE O/P EST MOD 30 MIN: CPT | Performed by: FAMILY MEDICINE

## 2025-06-16 PROCEDURE — 1126F AMNT PAIN NOTED NONE PRSNT: CPT | Performed by: FAMILY MEDICINE

## 2025-06-16 PROCEDURE — 3077F SYST BP >= 140 MM HG: CPT | Performed by: FAMILY MEDICINE

## 2025-06-16 PROCEDURE — 3079F DIAST BP 80-89 MM HG: CPT | Performed by: FAMILY MEDICINE

## 2025-06-16 RX ORDER — MONTELUKAST SODIUM 10 MG/1
10 TABLET ORAL NIGHTLY
Qty: 90 TABLET | Refills: 1 | Status: SHIPPED | OUTPATIENT
Start: 2025-06-16

## 2025-06-16 NOTE — PROGRESS NOTES
Established Patient        Chief Complaint:   Chief Complaint   Patient presents with    Hypertension        Elli Edwards is a 74 y.o. female    History of Present Illness:   Here today in scheduled follow-up visit of chronic allergic rhinitis, hypertension and dyslipidemia.    She denies any problems with current medication regimen.    Denies chest pain, syncope, palpitations or vertigo.  Denies fever, chills or night sweats.  Maintains an active lifestyle, balanced dietary intake; reports good hydration habits.  Denies hematuria/dysuria.  Denies any BRB/BTS.  No new rashes.  Denies orthopnea, epistaxis or hemoptysis.    Subjective     The following portions of the patient's history were reviewed and updated as appropriate: allergies, current medications, past family history, past medical history, past social history, past surgical history and problem list.    Allergies   Allergen Reactions    Codeine Nausea And Vomiting and Dizziness       Review of Systems  Constitutional: Negative for fever. Negative for chills, diaphoresis, fatigue and unexpected weight change.   HENT: No dysphagia; no changes to vision/hearing/smell/taste; no epistaxis  Eyes: Negative for redness and visual disturbance.   Respiratory: negative for shortness of breath. Negative for chest pain . Negative for cough and chest tightness.   Cardiovascular: Negative for chest pain and palpitations.   Gastrointestinal: Negative for abdominal distention, abdominal pain and blood in stool.   Endocrine: Negative for cold intolerance and heat intolerance.   Genitourinary: Chronic urinary incontinence.  Musculoskeletal: Chronic arthralgias, without back pain and myalgias.   Skin: Negative for color change, rash and wound.   Neurological: Negative for syncope, weakness and headaches.   Hematological: Negative for adenopathy. Does not bruise/bleed easily.   Psychiatric/Behavioral: Negative for confusion. The patient is not  "nervous/anxious.  Otherwise, as per above.    Objective     Physical Exam   Vital Signs: /80   Pulse 81   Ht 157.5 cm (62\")   Wt 89.4 kg (197 lb)   LMP  (LMP Unknown)   SpO2 95%   BMI 36.03 kg/m²   Class 2 Severe Obesity (BMI >=35 and <=39.9). Obesity-related health conditions include the following: hypertension and osteoarthritis. Obesity is unchanged. BMI is is above average; BMI management plan is completed. We discussed portion control and increasing exercise.  Patient's (Body mass index is 36.03 kg/m².) indicates that they are     General Appearance: alert, oriented x 3, no acute distress.  During questioning/examination.  Well-nourished and developed female.  Skin: warm and dry.   HEENT: Atraumatic.  pupils round and reactive to light and accommodation, oral mucosa pink and moist.  Nares patent without epistaxis.  External auditory canals are patent tympanic membranes intact.  Boggy/inflamed nasal turbinates, mod PND without pustules or exudate.  Neck: supple, no JVD, trachea midline.  No thyromegaly  Lungs: CTA, unlabored breathing effort.  Heart: RRR, normal S1 and S2, no S3, no rub.  Abdomen: soft, non-tender, no palpable bladder, present bowel sounds to auscultation ×4.  No guarding or rigidity.  Extremities: no clubbing, cyanosis or edema.  Good range of motion actively and passively.  Symmetric muscle strength and development.  Ambulates independently, slightly antalgic gait, favors full weightbearing to the left knee at times.  Restricted range of motion on A/P ROM of left knee, significant crepitance additionally.  Medial/lateral joint line tenderness noted.  There is an effusion to the medial and lateral gutters of the left knee.  Neuro: normal speech and mental status.  Cranial nerves II through XII intact.  No anosmia. DTR 2+; proprioception intact.  No focal motor/sensory deficits.        Assessment and Plan      Assessment/Plan:   Diagnoses and all orders for this visit:    1. Chronic " allergic rhinitis (Primary)  -     montelukast (Singulair) 10 MG tablet; Take 1 tablet by mouth Every Night.  Dispense: 90 tablet; Refill: 1    2. Benign essential hypertension    3. Dyslipidemia      Adding trial of montelukast.  I have asked that she notify the office if any ill effects of medication.    VSS, appears HD asymptomatic.  BP @ goal.    Continue statin therapy, as well as low cholesterol dietary changes.    Discussion Summary:    Discussed plan of care in detail with pt today; pt verb understanding and agrees.    I spent 35 minutes caring for Elli on this date of service. This time includes time spent by me in the following activities:preparing for the visit, performing a medically appropriate examination and/or evaluation , counseling and educating the patient/family/caregiver, ordering medications, tests, or procedures, documenting information in the medical record, and care coordination    I confirm accuracy of unchanged data/findings which have been carried forward from previous visit, as well as I have updated appropriately those that have changed.    Follow up:  No follow-ups on file.     There are no Patient Instructions on file for this visit.        Avery Blackburn DO  06/16/25  11:54 EDT

## 2025-06-19 DIAGNOSIS — E55.9 VITAMIN D DEFICIENCY: ICD-10-CM

## 2025-06-19 RX ORDER — ERGOCALCIFEROL 1.25 MG/1
50000 CAPSULE, LIQUID FILLED ORAL WEEKLY
Qty: 8 CAPSULE | Refills: 2 | Status: SHIPPED | OUTPATIENT
Start: 2025-06-19

## 2025-06-19 NOTE — TELEPHONE ENCOUNTER
Rx Refill Note  Requested Prescriptions     Pending Prescriptions Disp Refills    vitamin D (ERGOCALCIFEROL) 1.25 MG (13843 UT) capsule capsule [Pharmacy Med Name: VITAMIN D2 50,000U 1.25MG C 1.25 MG Capsule] 8 capsule 2     Sig: TAKE 1 CAPSULE BY MOUTH 1 (ONE) TIME PER WEEK.      Last office visit with prescribing clinician: 6/16/2025   Last telemedicine visit with prescribing clinician: Visit date not found   Next office visit with prescribing clinician: 11/17/2025                         Would you like a call back once the refill request has been completed: [] Yes [] No    If the office needs to give you a call back, can they leave a voicemail: [] Yes [] No    Zulma Youngblood MA  06/19/25, 10:33 EDT

## 2025-06-23 ENCOUNTER — TELEPHONE (OUTPATIENT)
Age: 75
End: 2025-06-23
Payer: MEDICARE

## 2025-06-23 NOTE — TELEPHONE ENCOUNTER
Patient called into the office on 06/19/2025 stating she had had a fall, and that she was experiencing some dizziness/disorentation. She didn't feel that it was too serious but I advised she may still need to be checked out just to make sure she didn't have a small concussion and with it being a head injury. We did not have any physicians in the office to see her so I suggested the urgent care/ER and patient voiced understanding.

## 2025-08-01 DIAGNOSIS — F51.01 PRIMARY INSOMNIA: ICD-10-CM

## 2025-08-01 DIAGNOSIS — J30.9 CHRONIC ALLERGIC RHINITIS: ICD-10-CM

## 2025-08-01 RX ORDER — HYDROXYZINE PAMOATE 25 MG/1
25 CAPSULE ORAL NIGHTLY
Qty: 90 CAPSULE | Refills: 2 | Status: SHIPPED | OUTPATIENT
Start: 2025-08-01

## 2025-08-01 NOTE — TELEPHONE ENCOUNTER
Rx Refill Note  Requested Prescriptions     Pending Prescriptions Disp Refills    hydrOXYzine pamoate (VISTARIL) 25 MG capsule [Pharmacy Med Name: HYDROXYZINE FELECIA 25 MG CAP* 25 Capsule] 90 capsule 2     Sig: TAKE 1 CAPSULE BY MOUTH EVERY NIGHT.      Last office visit with prescribing clinician: 6/16/2025   Last telemedicine visit with prescribing clinician: Visit date not found   Next office visit with prescribing clinician: 11/17/2025                         Would you like a call back once the refill request has been completed: [] Yes [] No    If the office needs to give you a call back, can they leave a voicemail: [] Yes [] No    Zulma Youngblood MA  08/01/25, 12:02 EDT

## (undated) DEVICE — SYR LUERLOK 20CC BX/50

## (undated) DEVICE — GLV SURG TRIUMPH ORTHO W/ALOE PF LTX 8 STRL

## (undated) DEVICE — 15 DEG. MICROKNIFE - 3MM: Brand: SHARPOINT

## (undated) DEVICE — FRCP BIOP COLD ENDOJAW ALLGTR W/NDL 2.8X2300MM BLU

## (undated) DEVICE — COVER,LIGHT HANDLE,FLX,1/PK: Brand: MEDLINE INDUSTRIES, INC.

## (undated) DEVICE — ENDOPATH XCEL BLADELESS TROCARS WITH STABILITY SLEEVES: Brand: ENDOPATH XCEL

## (undated) DEVICE — TROCARS: Brand: KII® BALLOON BLUNT TIP SYSTEM

## (undated) DEVICE — QUICK CATCH IN-LINE SUCTION POLYP TRAP IS USED FOR SUCTION RETRIEVAL OF ENDOSCOPICALLY REMOVED POLYPS.

## (undated) DEVICE — RUBBERBAND LF STRL PK/2

## (undated) DEVICE — HYBRID TUBING/CAP SET FOR OLYMPUS® SCOPES: Brand: ERBE

## (undated) DEVICE — SUT MNCRYL PLS ANTIB UD 4/0 PC3 18IN

## (undated) DEVICE — SHEET,DRAPE,70X100,STERILE: Brand: MEDLINE

## (undated) DEVICE — GLV SURG SENSICARE W/ALOE PF LF 8 STRL

## (undated) DEVICE — PAD GRND REM POLYHESIVE A/ DISP

## (undated) DEVICE — ENDOPATH XCEL UNIVERSAL TROCAR STABLILITY SLEEVES: Brand: ENDOPATH XCEL

## (undated) DEVICE — LUBE JELLY PK/2.75GM STRL BX/144

## (undated) DEVICE — PILLW ABD SM

## (undated) DEVICE — ENDOPOUCH RETRIEVER SPECIMEN RETRIEVAL BAGS: Brand: ENDOPOUCH RETRIEVER

## (undated) DEVICE — 3M™ STERI-STRIP™ REINFORCED ADHESIVE SKIN CLOSURES, R1547, 1/2 IN X 4 IN (12 MM X 100 MM), 6 STRIPS/ENVELOPE: Brand: 3M™ STERI-STRIP™

## (undated) DEVICE — CANN IRR/INJ AIR ANT CHAMBER 6MM BEND 27G

## (undated) DEVICE — ENDOSCOPY PORT CONNECTOR FOR OLYMPUS® SCOPES: Brand: ERBE

## (undated) DEVICE — DRSNG SURESITE123 6X8IN

## (undated) DEVICE — PK HIP GEN 20

## (undated) DEVICE — VLV SXN AIR/H2O ORCAPOD3 1P/U STRL

## (undated) DEVICE — GLV SURG SENSICARE PI LF PF 7.5

## (undated) DEVICE — PK LAP LASR CHOLE 10

## (undated) DEVICE — SUT VIC 0 UR6 27IN VCP603H

## (undated) DEVICE — Device

## (undated) DEVICE — DRSNG SURG AQUACEL AG 9X25CM

## (undated) DEVICE — 3M™ TEGADERM™ IV TRANSPARENT FILM DRESSING WITH BORDER 100 BAGS/CARTON 4 CARTONS/CASE 1633: Brand: 3M™ TEGADERM™

## (undated) DEVICE — DRSNG TELFA ILND ADH 4X6IN

## (undated) DEVICE — Device: Brand: DEFENDO AIR/WATER/SUCTION AND BIOPSY VALVE

## (undated) DEVICE — SYR LUERLOK 10CC

## (undated) DEVICE — SINGLE-USE POLYPECTOMY SNARE: Brand: CAPTIVATOR II

## (undated) DEVICE — SLIT KNIFE FULL HDL-2.85MM ANG: Brand: SHARPOINT

## (undated) DEVICE — HANDPIECE SET WITH HIGH FLOW TIP AND SUCTION TUBE: Brand: INTERPULSE

## (undated) DEVICE — FLEXIBLE YANKAUER,MEDIUM TIP, NO VACUUM CONTROL: Brand: ARGYLE

## (undated) DEVICE — ADHS LIQ MASTISOL 2/3ML

## (undated) DEVICE — VIOLET BRAIDED (POLYGLACTIN 910), SYNTHETIC ABSORBABLE SUTURE: Brand: COATED VICRYL

## (undated) DEVICE — GLV SURG SENSICARE MICRO PF LF 6.5 STRL

## (undated) DEVICE — ENDOCUT SCISSOR TIP, DISPOSABLE: Brand: RENEW

## (undated) DEVICE — [HIGH FLOW INSUFFLATOR,  DO NOT USE IF PACKAGE IS DAMAGED,  KEEP DRY,  KEEP AWAY FROM SUNLIGHT,  PROTECT FROM HEAT AND RADIOACTIVE SOURCES.]: Brand: PNEUMOSURE

## (undated) DEVICE — FRCP BX RADJAW4 NDL 2.8 240 STD OG

## (undated) DEVICE — SUT VIC 2/0 CT1 27IN J259H

## (undated) DEVICE — PK CATARACT OPTHAMALOGY

## (undated) DEVICE — SUT ETHIB 5 V37 30IN MB66G

## (undated) DEVICE — SNAR POLYP SENSATION STDOVL 27 240 BX40

## (undated) DEVICE — CUFF SCD HEMOFORCE SEQ CALF STD MD

## (undated) DEVICE — 2108 SERIES SAGITTAL BLADE, NO OFFSET  (24.8 X 1.24 X 80.1MM)